# Patient Record
Sex: FEMALE | Race: WHITE | Employment: OTHER | URBAN - METROPOLITAN AREA
[De-identification: names, ages, dates, MRNs, and addresses within clinical notes are randomized per-mention and may not be internally consistent; named-entity substitution may affect disease eponyms.]

---

## 2017-01-04 PROBLEM — M25.519 NONTRAUMATIC SHOULDER PAIN: Status: ACTIVE | Noted: 2017-01-04

## 2017-01-04 PROBLEM — E04.9 GOITER: Status: ACTIVE | Noted: 2017-01-04

## 2017-01-04 PROBLEM — M25.60 LIMITED JOINT RANGE OF MOTION (ROM): Status: ACTIVE | Noted: 2017-01-04

## 2017-01-16 PROBLEM — H61.90 LESION OF EARLOBE: Status: ACTIVE | Noted: 2017-01-16

## 2017-01-16 PROBLEM — F17.210 CIGARETTE SMOKER TWO PACKS A DAY OR LESS: Status: ACTIVE | Noted: 2017-01-16

## 2017-01-23 PROBLEM — I10 HTN (HYPERTENSION): Status: ACTIVE | Noted: 2017-01-23

## 2017-02-24 PROBLEM — I35.0 NONRHEUMATIC AORTIC VALVE STENOSIS: Status: ACTIVE | Noted: 2017-02-24

## 2017-02-24 PROBLEM — E55.9 VITAMIN D DEFICIENCY: Status: ACTIVE | Noted: 2017-02-24

## 2017-02-24 PROBLEM — E11.9 TYPE 2 DIABETES MELLITUS WITHOUT COMPLICATION, WITHOUT LONG-TERM CURRENT USE OF INSULIN (HCC): Status: ACTIVE | Noted: 2017-02-24

## 2017-02-24 PROBLEM — R94.31 ABNORMAL EKG: Status: ACTIVE | Noted: 2017-02-24

## 2017-02-28 PROBLEM — E78.2 MIXED HYPERLIPIDEMIA: Status: ACTIVE | Noted: 2017-02-28

## 2017-02-28 PROBLEM — E66.8 MODERATE OBESITY: Status: ACTIVE | Noted: 2017-02-28

## 2017-02-28 PROBLEM — E66.9 MODERATE OBESITY: Status: ACTIVE | Noted: 2017-02-28

## 2017-09-20 PROBLEM — M21.70 LEG LENGTH DIFFERENCE, ACQUIRED: Status: ACTIVE | Noted: 2017-09-20

## 2017-10-06 PROBLEM — G89.29 CHRONIC MIDLINE LOW BACK PAIN WITH LEFT-SIDED SCIATICA: Status: ACTIVE | Noted: 2017-10-06

## 2017-10-06 PROBLEM — M54.42 CHRONIC MIDLINE LOW BACK PAIN WITH LEFT-SIDED SCIATICA: Status: ACTIVE | Noted: 2017-10-06

## 2017-10-12 PROBLEM — E89.0 POSTOPERATIVE HYPOTHYROIDISM: Status: ACTIVE | Noted: 2017-10-12

## 2017-12-13 PROBLEM — N95.1 HOT FLASH, MENOPAUSAL: Status: ACTIVE | Noted: 2017-12-13

## 2017-12-13 PROBLEM — R04.0 MILD EPISTAXIS: Status: ACTIVE | Noted: 2017-12-13

## 2018-02-14 PROBLEM — F33.2 SEVERE EPISODE OF RECURRENT MAJOR DEPRESSIVE DISORDER, WITHOUT PSYCHOTIC FEATURES (HCC): Status: ACTIVE | Noted: 2018-02-14

## 2018-03-13 ENCOUNTER — OFFICE VISIT (OUTPATIENT)
Dept: FAMILY MEDICINE CLINIC | Age: 63
End: 2018-03-13
Payer: COMMERCIAL

## 2018-03-13 VITALS
HEIGHT: 62 IN | WEIGHT: 194.25 LBS | BODY MASS INDEX: 35.75 KG/M2 | DIASTOLIC BLOOD PRESSURE: 68 MMHG | TEMPERATURE: 97.6 F | RESPIRATION RATE: 17 BRPM | OXYGEN SATURATION: 96 % | HEART RATE: 67 BPM | SYSTOLIC BLOOD PRESSURE: 122 MMHG

## 2018-03-13 DIAGNOSIS — Z13.31 POSITIVE DEPRESSION SCREENING: ICD-10-CM

## 2018-03-13 DIAGNOSIS — F33.2 SEVERE EPISODE OF RECURRENT MAJOR DEPRESSIVE DISORDER, WITHOUT PSYCHOTIC FEATURES (HCC): Primary | ICD-10-CM

## 2018-03-13 DIAGNOSIS — F32.1 MODERATE SINGLE CURRENT EPISODE OF MAJOR DEPRESSIVE DISORDER (HCC): ICD-10-CM

## 2018-03-13 PROCEDURE — G8482 FLU IMMUNIZE ORDER/ADMIN: HCPCS | Performed by: NURSE PRACTITIONER

## 2018-03-13 PROCEDURE — G8431 POS CLIN DEPRES SCRN F/U DOC: HCPCS | Performed by: NURSE PRACTITIONER

## 2018-03-13 PROCEDURE — G8427 DOCREV CUR MEDS BY ELIG CLIN: HCPCS | Performed by: NURSE PRACTITIONER

## 2018-03-13 PROCEDURE — G8417 CALC BMI ABV UP PARAM F/U: HCPCS | Performed by: NURSE PRACTITIONER

## 2018-03-13 PROCEDURE — 1036F TOBACCO NON-USER: CPT | Performed by: NURSE PRACTITIONER

## 2018-03-13 PROCEDURE — 99213 OFFICE O/P EST LOW 20 MIN: CPT | Performed by: NURSE PRACTITIONER

## 2018-03-13 PROCEDURE — 3014F SCREEN MAMMO DOC REV: CPT | Performed by: NURSE PRACTITIONER

## 2018-03-13 PROCEDURE — 96160 PT-FOCUSED HLTH RISK ASSMT: CPT | Performed by: NURSE PRACTITIONER

## 2018-03-13 PROCEDURE — 3017F COLORECTAL CA SCREEN DOC REV: CPT | Performed by: NURSE PRACTITIONER

## 2018-03-13 ASSESSMENT — ENCOUNTER SYMPTOMS
VOICE CHANGE: 0
BACK PAIN: 0
NAUSEA: 0
TROUBLE SWALLOWING: 0
SINUS PAIN: 0
SORE THROAT: 0
SINUS PRESSURE: 0
CONSTIPATION: 0
VOMITING: 0
ABDOMINAL PAIN: 0
WHEEZING: 0
FACIAL SWELLING: 0
RHINORRHEA: 0
COLOR CHANGE: 0
DIARRHEA: 0
SHORTNESS OF BREATH: 0
COUGH: 0

## 2018-03-13 ASSESSMENT — PATIENT HEALTH QUESTIONNAIRE - PHQ9
7. TROUBLE CONCENTRATING ON THINGS, SUCH AS READING THE NEWSPAPER OR WATCHING TELEVISION: 0
4. FEELING TIRED OR HAVING LITTLE ENERGY: 2
6. FEELING BAD ABOUT YOURSELF - OR THAT YOU ARE A FAILURE OR HAVE LET YOURSELF OR YOUR FAMILY DOWN: 0
SUM OF ALL RESPONSES TO PHQ QUESTIONS 1-9: 12
8. MOVING OR SPEAKING SO SLOWLY THAT OTHER PEOPLE COULD HAVE NOTICED. OR THE OPPOSITE, BEING SO FIGETY OR RESTLESS THAT YOU HAVE BEEN MOVING AROUND A LOT MORE THAN USUAL: 0
3. TROUBLE FALLING OR STAYING ASLEEP: 3
9. THOUGHTS THAT YOU WOULD BE BETTER OFF DEAD, OR OF HURTING YOURSELF: 0
2. FEELING DOWN, DEPRESSED OR HOPELESS: 2
10. IF YOU CHECKED OFF ANY PROBLEMS, HOW DIFFICULT HAVE THESE PROBLEMS MADE IT FOR YOU TO DO YOUR WORK, TAKE CARE OF THINGS AT HOME, OR GET ALONG WITH OTHER PEOPLE: 1
SUM OF ALL RESPONSES TO PHQ9 QUESTIONS 1 & 2: 5
5. POOR APPETITE OR OVEREATING: 2
1. LITTLE INTEREST OR PLEASURE IN DOING THINGS: 3

## 2018-03-13 NOTE — PROGRESS NOTES
intolerance, heat intolerance, polydipsia, polyphagia and polyuria. Musculoskeletal: Negative for arthralgias, back pain, gait problem, joint swelling, myalgias, neck pain and neck stiffness. Skin: Negative for color change, pallor, rash and wound. Allergic/Immunologic: Negative for environmental allergies, food allergies and immunocompromised state. Neurological: Negative for dizziness, tremors, seizures, syncope, facial asymmetry, speech difficulty, weakness, light-headedness, numbness and headaches. Hematological: Negative for adenopathy. Does not bruise/bleed easily. Psychiatric/Behavioral: Positive for decreased concentration, dysphoric mood and sleep disturbance. Negative for agitation, behavioral problems, confusion, hallucinations, self-injury and suicidal ideas. The patient is not nervous/anxious and is not hyperactive.         OBJECTIVE:     VS:  Wt Readings from Last 3 Encounters:   03/13/18 194 lb 4 oz (88.1 kg)   02/14/18 192 lb 12 oz (87.4 kg)   01/31/18 191 lb (86.6 kg)                       Vitals:    03/13/18 1438   BP: 122/68   Site: Left Arm   Position: Sitting   Cuff Size: Medium Adult   Pulse: 67   Resp: 17   Temp: 97.6 °F (36.4 °C)   TempSrc: Temporal   SpO2: 96%   Weight: 194 lb 4 oz (88.1 kg)   Height: 5' 2\" (1.575 m)       General: Alert and oriented to person, place, and time, well developed and well nourished, obese in no acute distress  SKIN: Warm and dry, intact without any rash, masses or lesions  HEAD: normocephalic, atraumatic  Eyes: sclera/conjunctiva clear, PERRLA, EOMI's intact  ENT: tympanic membranes, external ear and ear canal normal bilaterally, normal hearing, Nose without deformity, nasal mucosa and turbinates normal without polyps   Throat: clear, tongue midline, tonsils 1+, no drainage, no masses or lesions noted, good dentition  Neck: supple and non-tender without mass, trachea midline, no cervical lymphadenopathy, S/P thyroidectomy  Cardiovascular: regular rate

## 2018-04-09 ENCOUNTER — TELEPHONE (OUTPATIENT)
Dept: FAMILY MEDICINE CLINIC | Age: 63
End: 2018-04-09

## 2018-04-10 RX ORDER — IBUPROFEN 800 MG/1
800 TABLET ORAL EVERY 6 HOURS PRN
Qty: 120 TABLET | Refills: 0 | Status: SHIPPED | OUTPATIENT
Start: 2018-04-10 | End: 2019-07-10 | Stop reason: SDUPTHER

## 2018-04-16 ENCOUNTER — OFFICE VISIT (OUTPATIENT)
Dept: FAMILY MEDICINE CLINIC | Age: 63
End: 2018-04-16
Payer: COMMERCIAL

## 2018-04-16 VITALS
HEIGHT: 63 IN | TEMPERATURE: 97.3 F | WEIGHT: 199.12 LBS | BODY MASS INDEX: 35.28 KG/M2 | HEART RATE: 89 BPM | SYSTOLIC BLOOD PRESSURE: 136 MMHG | RESPIRATION RATE: 20 BRPM | DIASTOLIC BLOOD PRESSURE: 60 MMHG | OXYGEN SATURATION: 98 %

## 2018-04-16 DIAGNOSIS — R63.5 WEIGHT GAIN: ICD-10-CM

## 2018-04-16 DIAGNOSIS — Z13.31 POSITIVE DEPRESSION SCREENING: ICD-10-CM

## 2018-04-16 DIAGNOSIS — E11.9 TYPE 2 DIABETES MELLITUS WITHOUT COMPLICATION, WITHOUT LONG-TERM CURRENT USE OF INSULIN (HCC): ICD-10-CM

## 2018-04-16 DIAGNOSIS — F33.2 SEVERE EPISODE OF RECURRENT MAJOR DEPRESSIVE DISORDER, WITHOUT PSYCHOTIC FEATURES (HCC): Primary | ICD-10-CM

## 2018-04-16 PROCEDURE — G8417 CALC BMI ABV UP PARAM F/U: HCPCS | Performed by: NURSE PRACTITIONER

## 2018-04-16 PROCEDURE — 1036F TOBACCO NON-USER: CPT | Performed by: NURSE PRACTITIONER

## 2018-04-16 PROCEDURE — 3014F SCREEN MAMMO DOC REV: CPT | Performed by: NURSE PRACTITIONER

## 2018-04-16 PROCEDURE — 3045F PR MOST RECENT HEMOGLOBIN A1C LEVEL 7.0-9.0%: CPT | Performed by: NURSE PRACTITIONER

## 2018-04-16 PROCEDURE — 2022F DILAT RTA XM EVC RTNOPTHY: CPT | Performed by: NURSE PRACTITIONER

## 2018-04-16 PROCEDURE — 3017F COLORECTAL CA SCREEN DOC REV: CPT | Performed by: NURSE PRACTITIONER

## 2018-04-16 PROCEDURE — G8427 DOCREV CUR MEDS BY ELIG CLIN: HCPCS | Performed by: NURSE PRACTITIONER

## 2018-04-16 PROCEDURE — 99213 OFFICE O/P EST LOW 20 MIN: CPT | Performed by: NURSE PRACTITIONER

## 2018-04-16 RX ORDER — VENLAFAXINE HYDROCHLORIDE 75 MG/1
CAPSULE, EXTENDED RELEASE ORAL
Refills: 12 | COMMUNITY
Start: 2018-04-04 | End: 2018-12-17 | Stop reason: DRUGHIGH

## 2018-04-16 RX ORDER — BLOOD-GLUCOSE METER
1 KIT MISCELLANEOUS DAILY
Qty: 1 DEVICE | Refills: 0 | Status: SHIPPED | OUTPATIENT
Start: 2018-04-16 | End: 2018-08-08 | Stop reason: SDUPTHER

## 2018-04-16 ASSESSMENT — ENCOUNTER SYMPTOMS
SINUS PRESSURE: 0
NAUSEA: 0
SINUS PAIN: 0
DIARRHEA: 0
EYE REDNESS: 0
RHINORRHEA: 0
CONSTIPATION: 0
EYE ITCHING: 0
TROUBLE SWALLOWING: 0
SHORTNESS OF BREATH: 0
EYE PAIN: 0
FACIAL SWELLING: 0
BACK PAIN: 0
ABDOMINAL PAIN: 0
SORE THROAT: 0
VOICE CHANGE: 0
EYE DISCHARGE: 0
COUGH: 0
VOMITING: 0
WHEEZING: 0
PHOTOPHOBIA: 0
COLOR CHANGE: 0

## 2018-04-16 ASSESSMENT — PATIENT HEALTH QUESTIONNAIRE - PHQ9
5. POOR APPETITE OR OVEREATING: 2
3. TROUBLE FALLING OR STAYING ASLEEP: 2
10. IF YOU CHECKED OFF ANY PROBLEMS, HOW DIFFICULT HAVE THESE PROBLEMS MADE IT FOR YOU TO DO YOUR WORK, TAKE CARE OF THINGS AT HOME, OR GET ALONG WITH OTHER PEOPLE: 1
1. LITTLE INTEREST OR PLEASURE IN DOING THINGS: 3
2. FEELING DOWN, DEPRESSED OR HOPELESS: 0
8. MOVING OR SPEAKING SO SLOWLY THAT OTHER PEOPLE COULD HAVE NOTICED. OR THE OPPOSITE, BEING SO FIGETY OR RESTLESS THAT YOU HAVE BEEN MOVING AROUND A LOT MORE THAN USUAL: 0
6. FEELING BAD ABOUT YOURSELF - OR THAT YOU ARE A FAILURE OR HAVE LET YOURSELF OR YOUR FAMILY DOWN: 0
SUM OF ALL RESPONSES TO PHQ QUESTIONS 1-9: 10
SUM OF ALL RESPONSES TO PHQ9 QUESTIONS 1 & 2: 3
9. THOUGHTS THAT YOU WOULD BE BETTER OFF DEAD, OR OF HURTING YOURSELF: 0
7. TROUBLE CONCENTRATING ON THINGS, SUCH AS READING THE NEWSPAPER OR WATCHING TELEVISION: 0
4. FEELING TIRED OR HAVING LITTLE ENERGY: 3

## 2018-05-07 ENCOUNTER — OFFICE VISIT (OUTPATIENT)
Dept: FAMILY MEDICINE CLINIC | Age: 63
End: 2018-05-07
Payer: COMMERCIAL

## 2018-05-07 ENCOUNTER — HOSPITAL ENCOUNTER (OUTPATIENT)
Age: 63
Discharge: HOME | End: 2018-05-09
Payer: COMMERCIAL

## 2018-05-07 VITALS
HEIGHT: 63 IN | BODY MASS INDEX: 35.53 KG/M2 | TEMPERATURE: 97.8 F | SYSTOLIC BLOOD PRESSURE: 132 MMHG | RESPIRATION RATE: 16 BRPM | WEIGHT: 200.5 LBS | HEART RATE: 82 BPM | OXYGEN SATURATION: 96 % | DIASTOLIC BLOOD PRESSURE: 66 MMHG

## 2018-05-07 DIAGNOSIS — Z12.11 ENCOUNTER FOR SCREENING COLONOSCOPY: ICD-10-CM

## 2018-05-07 DIAGNOSIS — R25.2 LEG CRAMPS: ICD-10-CM

## 2018-05-07 DIAGNOSIS — E11.9 TYPE 2 DIABETES MELLITUS WITHOUT COMPLICATION, WITHOUT LONG-TERM CURRENT USE OF INSULIN (HCC): Primary | ICD-10-CM

## 2018-05-07 DIAGNOSIS — K21.00 GASTROESOPHAGEAL REFLUX DISEASE WITH ESOPHAGITIS: ICD-10-CM

## 2018-05-07 LAB
GLUCOSE BLD-MCNC: 191 MG/DL
HBA1C MFR BLD: 8.4 %

## 2018-05-07 PROCEDURE — G8417 CALC BMI ABV UP PARAM F/U: HCPCS | Performed by: NURSE PRACTITIONER

## 2018-05-07 PROCEDURE — 1036F TOBACCO NON-USER: CPT | Performed by: NURSE PRACTITIONER

## 2018-05-07 PROCEDURE — 3017F COLORECTAL CA SCREEN DOC REV: CPT | Performed by: NURSE PRACTITIONER

## 2018-05-07 PROCEDURE — G8427 DOCREV CUR MEDS BY ELIG CLIN: HCPCS | Performed by: NURSE PRACTITIONER

## 2018-05-07 PROCEDURE — 83036 HEMOGLOBIN GLYCOSYLATED A1C: CPT | Performed by: NURSE PRACTITIONER

## 2018-05-07 PROCEDURE — 3045F PR MOST RECENT HEMOGLOBIN A1C LEVEL 7.0-9.0%: CPT | Performed by: NURSE PRACTITIONER

## 2018-05-07 PROCEDURE — 84132 ASSAY OF SERUM POTASSIUM: CPT

## 2018-05-07 PROCEDURE — 82962 GLUCOSE BLOOD TEST: CPT | Performed by: NURSE PRACTITIONER

## 2018-05-07 PROCEDURE — 2022F DILAT RTA XM EVC RTNOPTHY: CPT | Performed by: NURSE PRACTITIONER

## 2018-05-07 PROCEDURE — 99214 OFFICE O/P EST MOD 30 MIN: CPT | Performed by: NURSE PRACTITIONER

## 2018-05-07 PROCEDURE — 83735 ASSAY OF MAGNESIUM: CPT

## 2018-05-07 RX ORDER — PANTOPRAZOLE SODIUM 20 MG/1
20 TABLET, DELAYED RELEASE ORAL DAILY
Qty: 30 TABLET | Refills: 3 | Status: SHIPPED | OUTPATIENT
Start: 2018-05-07 | End: 2018-12-17 | Stop reason: ALTCHOICE

## 2018-05-07 ASSESSMENT — ENCOUNTER SYMPTOMS
VOMITING: 0
ABDOMINAL PAIN: 0
SINUS PRESSURE: 0
ABDOMINAL DISTENTION: 0
WHEEZING: 0
SHORTNESS OF BREATH: 0
CONSTIPATION: 0
BACK PAIN: 0
TROUBLE SWALLOWING: 0
RHINORRHEA: 0
COUGH: 1
VOICE CHANGE: 0
SINUS PAIN: 0
NAUSEA: 0
COLOR CHANGE: 0
DIARRHEA: 0
SORE THROAT: 0
FACIAL SWELLING: 0

## 2018-05-08 LAB
MAGNESIUM: 2.2 MG/DL (ref 1.6–2.6)
POTASSIUM SERPL-SCNC: 4.5 MMOL/L (ref 3.5–5)

## 2018-05-13 ENCOUNTER — PATIENT MESSAGE (OUTPATIENT)
Dept: FAMILY MEDICINE CLINIC | Age: 63
End: 2018-05-13

## 2018-05-13 DIAGNOSIS — E55.9 VITAMIN D DEFICIENCY: ICD-10-CM

## 2018-05-14 RX ORDER — ERGOCALCIFEROL 1.25 MG/1
CAPSULE ORAL
Qty: 4 CAPSULE | Refills: 0 | Status: SHIPPED | OUTPATIENT
Start: 2018-05-14 | End: 2018-06-07 | Stop reason: SDUPTHER

## 2018-05-21 LAB — DIABETIC RETINOPATHY: NORMAL

## 2018-05-22 ENCOUNTER — OFFICE VISIT (OUTPATIENT)
Dept: FAMILY MEDICINE CLINIC | Age: 63
End: 2018-05-22
Payer: COMMERCIAL

## 2018-05-22 VITALS
WEIGHT: 184 LBS | BODY MASS INDEX: 32.6 KG/M2 | TEMPERATURE: 97.6 F | SYSTOLIC BLOOD PRESSURE: 126 MMHG | DIASTOLIC BLOOD PRESSURE: 68 MMHG | HEART RATE: 82 BPM | HEIGHT: 63 IN | RESPIRATION RATE: 16 BRPM | OXYGEN SATURATION: 94 %

## 2018-05-22 DIAGNOSIS — Z84.81 FAMILY HISTORY OF BRCA GENE POSITIVE: ICD-10-CM

## 2018-05-22 DIAGNOSIS — E11.9 TYPE 2 DIABETES MELLITUS WITHOUT COMPLICATION, WITHOUT LONG-TERM CURRENT USE OF INSULIN (HCC): Primary | ICD-10-CM

## 2018-05-22 DIAGNOSIS — Z80.3 FHX: BREAST CANCER IN FIRST DEGREE RELATIVE: ICD-10-CM

## 2018-05-22 LAB — GLUCOSE BLD-MCNC: 173 MG/DL

## 2018-05-22 PROCEDURE — 3017F COLORECTAL CA SCREEN DOC REV: CPT | Performed by: NURSE PRACTITIONER

## 2018-05-22 PROCEDURE — 1036F TOBACCO NON-USER: CPT | Performed by: NURSE PRACTITIONER

## 2018-05-22 PROCEDURE — G8417 CALC BMI ABV UP PARAM F/U: HCPCS | Performed by: NURSE PRACTITIONER

## 2018-05-22 PROCEDURE — 2022F DILAT RTA XM EVC RTNOPTHY: CPT | Performed by: NURSE PRACTITIONER

## 2018-05-22 PROCEDURE — 82962 GLUCOSE BLOOD TEST: CPT | Performed by: NURSE PRACTITIONER

## 2018-05-22 PROCEDURE — G8427 DOCREV CUR MEDS BY ELIG CLIN: HCPCS | Performed by: NURSE PRACTITIONER

## 2018-05-22 PROCEDURE — 99213 OFFICE O/P EST LOW 20 MIN: CPT | Performed by: NURSE PRACTITIONER

## 2018-05-22 PROCEDURE — 3045F PR MOST RECENT HEMOGLOBIN A1C LEVEL 7.0-9.0%: CPT | Performed by: NURSE PRACTITIONER

## 2018-05-22 RX ORDER — PEN NEEDLE, DIABETIC 32GX 5/32"
NEEDLE, DISPOSABLE MISCELLANEOUS
Refills: 3 | COMMUNITY
Start: 2018-05-14 | End: 2018-12-17 | Stop reason: SDUPTHER

## 2018-05-22 ASSESSMENT — ENCOUNTER SYMPTOMS
SHORTNESS OF BREATH: 0
COLOR CHANGE: 0
CONSTIPATION: 0
NAUSEA: 0
VOMITING: 0
WHEEZING: 0
DIARRHEA: 0
ABDOMINAL PAIN: 0
COUGH: 0
BACK PAIN: 0

## 2018-06-07 DIAGNOSIS — E55.9 VITAMIN D DEFICIENCY: ICD-10-CM

## 2018-06-07 RX ORDER — ERGOCALCIFEROL 1.25 MG/1
CAPSULE ORAL
Qty: 4 CAPSULE | Refills: 0 | Status: SHIPPED | OUTPATIENT
Start: 2018-06-07 | End: 2018-07-05 | Stop reason: SDUPTHER

## 2018-06-22 ENCOUNTER — OFFICE VISIT (OUTPATIENT)
Dept: FAMILY MEDICINE CLINIC | Age: 63
End: 2018-06-22
Payer: COMMERCIAL

## 2018-06-22 VITALS
DIASTOLIC BLOOD PRESSURE: 72 MMHG | OXYGEN SATURATION: 97 % | SYSTOLIC BLOOD PRESSURE: 128 MMHG | HEART RATE: 64 BPM | TEMPERATURE: 97.7 F | HEIGHT: 63 IN | RESPIRATION RATE: 19 BRPM | BODY MASS INDEX: 34.2 KG/M2 | WEIGHT: 193 LBS

## 2018-06-22 DIAGNOSIS — Z84.81 FH: BRCA1 GENE POSITIVE: ICD-10-CM

## 2018-06-22 DIAGNOSIS — Z12.31 ENCOUNTER FOR SCREENING MAMMOGRAM FOR HIGH-RISK PATIENT: ICD-10-CM

## 2018-06-22 DIAGNOSIS — E11.9 TYPE 2 DIABETES MELLITUS WITHOUT COMPLICATION, WITHOUT LONG-TERM CURRENT USE OF INSULIN (HCC): ICD-10-CM
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
## 2018-06-22 LAB — GLUCOSE BLD-MCNC: 123 MG/DL

## 2018-06-22 PROCEDURE — 1036F TOBACCO NON-USER: CPT | Performed by: NURSE PRACTITIONER

## 2018-06-22 PROCEDURE — G8427 DOCREV CUR MEDS BY ELIG CLIN: HCPCS | Performed by: NURSE PRACTITIONER

## 2018-06-22 PROCEDURE — 82962 GLUCOSE BLOOD TEST: CPT | Performed by: NURSE PRACTITIONER

## 2018-06-22 PROCEDURE — 2022F DILAT RTA XM EVC RTNOPTHY: CPT | Performed by: NURSE PRACTITIONER

## 2018-06-22 PROCEDURE — 3045F PR MOST RECENT HEMOGLOBIN A1C LEVEL 7.0-9.0%: CPT | Performed by: NURSE PRACTITIONER

## 2018-06-22 PROCEDURE — G8417 CALC BMI ABV UP PARAM F/U: HCPCS | Performed by: NURSE PRACTITIONER

## 2018-06-22 PROCEDURE — 3017F COLORECTAL CA SCREEN DOC REV: CPT | Performed by: NURSE PRACTITIONER

## 2018-06-22 PROCEDURE — 99214 OFFICE O/P EST MOD 30 MIN: CPT | Performed by: NURSE PRACTITIONER

## 2018-06-22 RX ORDER — DOXYCYCLINE HYCLATE 100 MG/1
100 CAPSULE ORAL 2 TIMES DAILY
Qty: 20 CAPSULE | Refills: 0 | Status: SHIPPED | OUTPATIENT
Start: 2018-06-22 | End: 2018-07-02

## 2018-06-22 ASSESSMENT — ENCOUNTER SYMPTOMS
DIARRHEA: 0
NAUSEA: 0
COLOR CHANGE: 1
VOMITING: 0
ABDOMINAL PAIN: 0
COUGH: 0
WHEEZING: 0
SHORTNESS OF BREATH: 0
BACK PAIN: 0
CONSTIPATION: 0

## 2018-06-28 ENCOUNTER — OFFICE VISIT (OUTPATIENT)
Dept: FAMILY MEDICINE CLINIC | Age: 63
End: 2018-06-28
Payer: COMMERCIAL

## 2018-06-28 VITALS
DIASTOLIC BLOOD PRESSURE: 70 MMHG | BODY MASS INDEX: 34.2 KG/M2 | SYSTOLIC BLOOD PRESSURE: 120 MMHG | OXYGEN SATURATION: 96 % | WEIGHT: 193 LBS | HEART RATE: 86 BPM | HEIGHT: 63 IN | TEMPERATURE: 98.2 F
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
## 2018-06-28 DIAGNOSIS — E11.9 TYPE 2 DIABETES MELLITUS WITHOUT COMPLICATION, WITHOUT LONG-TERM CURRENT USE OF INSULIN (HCC): ICD-10-CM

## 2018-06-28 PROCEDURE — 99213 OFFICE O/P EST LOW 20 MIN: CPT | Performed by: FAMILY MEDICINE

## 2018-06-28 PROCEDURE — 3017F COLORECTAL CA SCREEN DOC REV: CPT | Performed by: FAMILY MEDICINE

## 2018-06-28 PROCEDURE — 2022F DILAT RTA XM EVC RTNOPTHY: CPT | Performed by: FAMILY MEDICINE

## 2018-06-28 PROCEDURE — 1036F TOBACCO NON-USER: CPT | Performed by: FAMILY MEDICINE

## 2018-06-28 PROCEDURE — 3045F PR MOST RECENT HEMOGLOBIN A1C LEVEL 7.0-9.0%: CPT | Performed by: FAMILY MEDICINE

## 2018-06-28 PROCEDURE — G8427 DOCREV CUR MEDS BY ELIG CLIN: HCPCS | Performed by: FAMILY MEDICINE

## 2018-06-28 PROCEDURE — G8417 CALC BMI ABV UP PARAM F/U: HCPCS | Performed by: FAMILY MEDICINE

## 2018-07-05 DIAGNOSIS — E55.9 VITAMIN D DEFICIENCY: ICD-10-CM

## 2018-07-05 RX ORDER — ERGOCALCIFEROL 1.25 MG/1
CAPSULE ORAL
Qty: 4 CAPSULE | Refills: 0 | Status: SHIPPED | OUTPATIENT
Start: 2018-07-05 | End: 2018-12-17 | Stop reason: CLARIF

## 2018-07-23 DIAGNOSIS — E11.9 TYPE 2 DIABETES MELLITUS WITHOUT COMPLICATION, WITHOUT LONG-TERM CURRENT USE OF INSULIN (HCC): ICD-10-CM

## 2018-07-23 RX ORDER — LANCETS 28 GAUGE
1 EACH MISCELLANEOUS DAILY
Qty: 100 EACH | Refills: 12 | Status: SHIPPED | OUTPATIENT
Start: 2018-07-23 | End: 2018-11-09 | Stop reason: CLARIF

## 2018-08-08 DIAGNOSIS — E11.9 TYPE 2 DIABETES MELLITUS WITHOUT COMPLICATION, WITHOUT LONG-TERM CURRENT USE OF INSULIN (HCC): ICD-10-CM

## 2018-08-08 RX ORDER — BLOOD-GLUCOSE METER
KIT MISCELLANEOUS
Qty: 1 DEVICE | Refills: 0 | Status: SHIPPED | OUTPATIENT
Start: 2018-08-08 | End: 2018-11-09 | Stop reason: CLARIF

## 2018-08-15 ENCOUNTER — OFFICE VISIT (OUTPATIENT)
Dept: FAMILY MEDICINE CLINIC | Age: 63
End: 2018-08-15
Payer: MEDICARE

## 2018-08-15 ENCOUNTER — HOSPITAL ENCOUNTER (OUTPATIENT)
Dept: MAMMOGRAPHY | Age: 63
Discharge: HOME | End: 2018-08-17
Payer: MEDICARE

## 2018-08-15 VITALS
HEIGHT: 63 IN | WEIGHT: 183.12 LBS | DIASTOLIC BLOOD PRESSURE: 64 MMHG | HEART RATE: 67 BPM | BODY MASS INDEX: 32.45 KG/M2 | TEMPERATURE: 97.4 F | OXYGEN SATURATION: 98 % | RESPIRATION RATE: 16 BRPM | SYSTOLIC BLOOD PRESSURE: 132 MMHG

## 2018-08-15 DIAGNOSIS — E78.2 MIXED HYPERLIPIDEMIA: ICD-10-CM

## 2018-08-15 DIAGNOSIS — E11.9 TYPE 2 DIABETES MELLITUS WITHOUT COMPLICATION, WITHOUT LONG-TERM CURRENT USE OF INSULIN (HCC): Primary | ICD-10-CM

## 2018-08-15 DIAGNOSIS — Z12.31 ENCOUNTER FOR SCREENING MAMMOGRAM FOR HIGH-RISK PATIENT: ICD-10-CM

## 2018-08-15 DIAGNOSIS — Z84.81 FH: BRCA1 GENE POSITIVE: ICD-10-CM

## 2018-08-15 DIAGNOSIS — E89.0 POSTOPERATIVE HYPOTHYROIDISM: ICD-10-CM

## 2018-08-15 LAB
GLUCOSE BLD-MCNC: 140 MG/DL
HBA1C MFR BLD: 6.7 %

## 2018-08-15 PROCEDURE — 77067 SCR MAMMO BI INCL CAD: CPT

## 2018-08-15 PROCEDURE — 82962 GLUCOSE BLOOD TEST: CPT | Performed by: NURSE PRACTITIONER

## 2018-08-15 PROCEDURE — G8417 CALC BMI ABV UP PARAM F/U: HCPCS | Performed by: NURSE PRACTITIONER

## 2018-08-15 PROCEDURE — G8427 DOCREV CUR MEDS BY ELIG CLIN: HCPCS | Performed by: NURSE PRACTITIONER

## 2018-08-15 PROCEDURE — 99213 OFFICE O/P EST LOW 20 MIN: CPT | Performed by: NURSE PRACTITIONER

## 2018-08-15 PROCEDURE — 1036F TOBACCO NON-USER: CPT | Performed by: NURSE PRACTITIONER

## 2018-08-15 PROCEDURE — 3017F COLORECTAL CA SCREEN DOC REV: CPT | Performed by: NURSE PRACTITIONER

## 2018-08-15 PROCEDURE — 83036 HEMOGLOBIN GLYCOSYLATED A1C: CPT | Performed by: NURSE PRACTITIONER

## 2018-08-15 PROCEDURE — 3044F HG A1C LEVEL LT 7.0%: CPT | Performed by: NURSE PRACTITIONER

## 2018-08-15 PROCEDURE — 2022F DILAT RTA XM EVC RTNOPTHY: CPT | Performed by: NURSE PRACTITIONER

## 2018-08-15 RX ORDER — LEVOTHYROXINE SODIUM 0.12 MG/1
125 TABLET ORAL DAILY
Qty: 30 TABLET | Refills: 6 | Status: SHIPPED | OUTPATIENT
Start: 2018-08-15 | End: 2019-01-11 | Stop reason: SDUPTHER

## 2018-08-15 RX ORDER — ROSUVASTATIN CALCIUM 20 MG/1
20 TABLET, COATED ORAL DAILY
Qty: 30 TABLET | Refills: 6 | Status: SHIPPED | OUTPATIENT
Start: 2018-08-15 | End: 2019-04-16 | Stop reason: SDUPTHER

## 2018-08-15 RX ORDER — HYDROCHLOROTHIAZIDE 25 MG/1
TABLET ORAL
Qty: 30 TABLET | Refills: 0 | Status: SHIPPED | OUTPATIENT
Start: 2018-08-15 | End: 2018-08-23 | Stop reason: SDUPTHER

## 2018-08-15 ASSESSMENT — ENCOUNTER SYMPTOMS
WHEEZING: 0
VOMITING: 0
BACK PAIN: 0
NAUSEA: 0
SINUS PRESSURE: 0
FACIAL SWELLING: 0
SORE THROAT: 0
COLOR CHANGE: 0
RHINORRHEA: 0
TROUBLE SWALLOWING: 0
DIARRHEA: 0
SHORTNESS OF BREATH: 0
CHEST TIGHTNESS: 0
SINUS PAIN: 0
VOICE CHANGE: 0
ABDOMINAL PAIN: 0
COUGH: 0
CONSTIPATION: 0

## 2018-08-15 NOTE — PROGRESS NOTES
diaphoresis, fatigue, fever and unexpected weight change. HENT: Negative for congestion, dental problem, drooling, ear discharge, ear pain, facial swelling, hearing loss, mouth sores, nosebleeds, postnasal drip, rhinorrhea, sinus pain, sinus pressure, sneezing, sore throat, tinnitus, trouble swallowing and voice change. Eyes: Negative for visual disturbance. Respiratory: Negative for cough, chest tightness, shortness of breath and wheezing. Cardiovascular: Negative for chest pain, palpitations and leg swelling. Gastrointestinal: Negative for abdominal pain, constipation, diarrhea, nausea and vomiting. Endocrine: Negative for cold intolerance, heat intolerance, polydipsia, polyphagia and polyuria. Genitourinary: Negative for difficulty urinating, frequency and urgency. Musculoskeletal: Negative for arthralgias, back pain, gait problem, joint swelling, myalgias, neck pain and neck stiffness. Skin: Negative for color change, pallor, rash and wound. Allergic/Immunologic: Negative for environmental allergies, food allergies and immunocompromised state. Neurological: Negative for dizziness, tremors, seizures, syncope, facial asymmetry, speech difficulty, weakness, light-headedness, numbness and headaches. Hematological: Negative for adenopathy. Does not bruise/bleed easily. Psychiatric/Behavioral: Negative for agitation, behavioral problems, confusion, decreased concentration, dysphoric mood, hallucinations, self-injury, sleep disturbance and suicidal ideas. The patient is not nervous/anxious and is not hyperactive.         OBJECTIVE:     VS:  Wt Readings from Last 3 Encounters:   08/15/18 183 lb 1.9 oz (83.1 kg)   06/28/18 193 lb (87.5 kg)   06/22/18 193 lb (87.5 kg)                       Vitals:    08/15/18 1159   BP: 132/64   Pulse: 67   Resp: 16   Temp: 97.4 °F (36.3 °C)   TempSrc: Temporal   SpO2: 98%   Weight: 183 lb 1.9 oz (83.1 kg)   Height: 5' 3\" (1.6 m)       General: Alert and bedtime then start over and log all values.  -Call next week or send BS through [place] and if the numbers are worsening will need to add different medication. Will increase the victoza to 1.8 mg daily  Bring log to next appointment  Foot exam every day; wash and dry well between toes, look for any redness, cracks, wounds notify provider if any problems occur  Reminder for annual Eye exam  Reminder for Podiatry visits Q 2 Months for toenail care if needed  Reminder to keep vaccines up dated  Exercise 30 minutes daily    Mixed hyperlipidemia well controlled  -     rosuvastatin (CRESTOR) 20 MG tablet; Take 1 tablet by mouth daily For cholesterol  -Will do PA for the Crestor since she didn't tolerate the atorvastatin as her  she is at goal with the Crestor.   -Discussed low fat diet, limit fast food, goodies, breads and pastas if consuming several days a week,  limit any alcohol consumption.  -Discussed weight reduction and exercise 30 minutes 5 days a week for total of 150 minutes weekly.  -Discussed if any unusual muscle aching/pain to contact the office, discussed medication and risk of muscle pain/damage from Rhabdomyolysis. Postoperative hypothyroidism stable  -     levothyroxine (SYNTHROID) 125 MCG tablet; Take 1 tablet by mouth daily For thyroid      Other orders  -     hydrochlorothiazide (HYDRODIURIL) 25 MG tablet; TAKE 1 TABLET BY MOUTH EVERY DAY                Return in about 1 month (around 9/15/2018) for DM med adjustment. Discussed weight loss, Discussed exercising 30 minutes daily and Discussed taking medications as directed and adverse effects        I have reviewed my findings and recommendations with Mame Mai.     Filiberto Fragoso, NP-C, FNP-BC

## 2018-09-20 ENCOUNTER — OFFICE VISIT (OUTPATIENT)
Dept: FAMILY MEDICINE CLINIC | Age: 63
End: 2018-09-20
Payer: MEDICARE

## 2018-09-20 VITALS
HEIGHT: 63 IN | WEIGHT: 183 LBS | OXYGEN SATURATION: 97 % | HEART RATE: 68 BPM | TEMPERATURE: 97 F | BODY MASS INDEX: 32.43 KG/M2 | SYSTOLIC BLOOD PRESSURE: 130 MMHG | DIASTOLIC BLOOD PRESSURE: 78 MMHG | RESPIRATION RATE: 16 BRPM

## 2018-09-20 DIAGNOSIS — Z23 FLU VACCINE NEED: ICD-10-CM

## 2018-09-20 DIAGNOSIS — Z13.31 POSITIVE DEPRESSION SCREENING: ICD-10-CM

## 2018-09-20 DIAGNOSIS — E11.9 TYPE 2 DIABETES MELLITUS WITHOUT COMPLICATION, WITHOUT LONG-TERM CURRENT USE OF INSULIN (HCC): Primary | ICD-10-CM

## 2018-09-20 LAB — GLUCOSE BLD-MCNC: 144 MG/DL

## 2018-09-20 PROCEDURE — 3044F HG A1C LEVEL LT 7.0%: CPT | Performed by: NURSE PRACTITIONER

## 2018-09-20 PROCEDURE — G8431 POS CLIN DEPRES SCRN F/U DOC: HCPCS | Performed by: NURSE PRACTITIONER

## 2018-09-20 PROCEDURE — G8417 CALC BMI ABV UP PARAM F/U: HCPCS | Performed by: NURSE PRACTITIONER

## 2018-09-20 PROCEDURE — 3017F COLORECTAL CA SCREEN DOC REV: CPT | Performed by: NURSE PRACTITIONER

## 2018-09-20 PROCEDURE — 90662 IIV NO PRSV INCREASED AG IM: CPT | Performed by: NURSE PRACTITIONER

## 2018-09-20 PROCEDURE — G0008 ADMIN INFLUENZA VIRUS VAC: HCPCS | Performed by: NURSE PRACTITIONER

## 2018-09-20 PROCEDURE — 2022F DILAT RTA XM EVC RTNOPTHY: CPT | Performed by: NURSE PRACTITIONER

## 2018-09-20 PROCEDURE — 1036F TOBACCO NON-USER: CPT | Performed by: NURSE PRACTITIONER

## 2018-09-20 PROCEDURE — 99213 OFFICE O/P EST LOW 20 MIN: CPT | Performed by: NURSE PRACTITIONER

## 2018-09-20 PROCEDURE — 82962 GLUCOSE BLOOD TEST: CPT | Performed by: NURSE PRACTITIONER

## 2018-09-20 PROCEDURE — G8427 DOCREV CUR MEDS BY ELIG CLIN: HCPCS | Performed by: NURSE PRACTITIONER

## 2018-09-20 RX ORDER — HYDROCHLOROTHIAZIDE 25 MG/1
25 TABLET ORAL DAILY
Qty: 30 TABLET | Refills: 3 | Status: SHIPPED | OUTPATIENT
Start: 2018-09-20 | End: 2018-12-17 | Stop reason: SDUPTHER

## 2018-09-20 ASSESSMENT — ENCOUNTER SYMPTOMS
TROUBLE SWALLOWING: 0
COLOR CHANGE: 0
SHORTNESS OF BREATH: 0
WHEEZING: 0
BACK PAIN: 0
FACIAL SWELLING: 0
SORE THROAT: 0
CHEST TIGHTNESS: 0
CONSTIPATION: 0
COUGH: 0
DIARRHEA: 0
VOMITING: 0
SINUS PAIN: 0
RHINORRHEA: 0
VOICE CHANGE: 0
SINUS PRESSURE: 0
ABDOMINAL PAIN: 0
NAUSEA: 0

## 2018-09-20 NOTE — PROGRESS NOTES
General: Alert and oriented to person, place, and time, well developed and well nourished, in no acute distress  SKIN: Warm and dry, intact without any rash, masses or lesions  HEAD: normocephalic, atraumatic  Eyes: sclera/conjunctiva clear, PERRLA, EOMI's intact  ENT: tympanic membranes, external ear and ear canal normal bilaterally, normal hearing, Nose without deformity, nasal mucosa and turbinates normal without polyps   Throat: clear, tongue midline, tonsils 1+, drainage, no masses or lesions noted, good dentition  Neck: supple and non-tender without mass, trachea midline, no cervical lymphadenopathy, no bruit, no thyromegaly or nodules  Cardiovascular: regular rate and regular SRGFSI,4/0 systolic at 2nd right intercostal space, throughout the precordium, radiates to axilla, radiates to carotids   rubs, clicks, or gallop. Distal pulses intact, no carotid bruits. No edema  Pulmonary/Chest: clear to auscultation bilaterally, no wheezes, rales or rhonchi, normal air movement, no respiratory distress  Abdomen: soft, non-tender, non-distended, normal bowel sounds, no masses or hepatosplenomegaly  Musculoskeletal: Normal ROM, no joint swelling, deformity or tenderness   Neurologic: reflexes normal and symmetric, no cranial nerve deficit, gait, coordination and speech normal  Extremities: no clubbing, cyanosis, or edema. Psychiatric: Good eye contact, normal mood and affect, answers questions appropriately    ASSESSMENT/PLAN   Tiara Melendrez was seen today for diabetes and other. Diagnoses and all orders for this visit:    Type 2 diabetes mellitus without complication, without long-term current use of insulin ([facility])  -     POCT Glucose  -     Liraglutide (VICTOZA) 18 MG/3ML SOPN SC injection; Inject 1.8 mg into the skin daily  Monitor BS at different times: 1 day fasting, then next day 2 hours after lunch, next day 2 hours after dinner, next day at bedtime then start over and log all values.   Bring log to next with Maliha Russ.     Summer Gonzalez, NP-C, FNP-BC

## 2018-09-20 NOTE — PATIENT INSTRUCTIONS
Patient Education        Influenza (Flu) Vaccine (Inactivated or Recombinant): What You Need to Know  Why get vaccinated? Influenza (\"flu\") is a contagious disease that spreads around the [place] every winter, usually between October and May. Flu is caused by influenza viruses and is spread mainly by coughing, sneezing, and close contact. Anyone can get flu. Flu strikes suddenly and can last several days. Symptoms vary by age, but can include:  · Fever/chills. · Sore throat. · Muscle aches. · Fatigue. · Cough. · Headache. · Runny or stuffy nose. Flu can also lead to pneumonia and blood infections, and cause diarrhea and seizures in children. If you have a medical condition, such as heart or lung disease, flu can make it worse. Flu is more dangerous for some people. Infants and young children, people 72years of age and older, pregnant women, and people with certain health conditions or a weakened immune system are at greatest risk. Each year thousands of people in the [facility] die from flu, and many more are hospitalized. Flu vaccine can:  · Keep you from getting flu. · Make flu less severe if you do get it. · Keep you from spreading flu to your family and other people. Inactivated and recombinant flu vaccines  A dose of flu vaccine is recommended every flu season. Children 6 months through 6years of age may need two doses during the same flu season. Everyone else needs only one dose each flu season. Some inactivated flu vaccines contain a very small amount of a mercury-based preservative called thimerosal. Studies have not shown thimerosal in vaccines to be harmful, but flu vaccines that do not contain thimerosal are available. There is no live flu virus in flu shots. They cannot cause the flu. There are many flu viruses, and they are always changing.  Each year a new flu vaccine is made to protect against three or four viruses that are likely to cause disease in the upcoming flu pneumococcal vaccine (PCV13) and/or DTaP vaccine at the same time might be slightly more likely to have a seizure caused by fever. Ask your doctor for more information. Tell your doctor if a child who is getting flu vaccine has ever had a seizure  Problems that could happen after any injected vaccine:  · People sometimes faint after a medical procedure, including vaccination. Sitting or lying down for about 15 minutes can help prevent fainting, and injuries caused by a fall. Tell your doctor if you feel dizzy, or have vision changes or ringing in the ears. · Some people get severe pain in the shoulder and have difficulty moving the arm where a shot was given. This happens very rarely. · Any medication can cause a severe allergic reaction. Such reactions from a vaccine are very rare, estimated at about 1 in a million doses, and would happen within a few minutes to a few hours after the vaccination. As with any medicine, there is a very remote chance of a vaccine causing a serious injury or death. The safety of vaccines is always being monitored. For more information, visit: www.cdc.gov/vaccinesafety/. What if there is a serious reaction? What should I look for? · Look for anything that concerns you, such as signs of a severe allergic reaction, very high fever, or unusual behavior. Signs of a severe allergic reaction can include hives, swelling of the face and throat, difficulty breathing, a fast heartbeat, dizziness, and weakness - usually within a few minutes to a few hours after the vaccination. What should I do? · If you think it is a severe allergic reaction or other emergency that can't wait, call 9-1-1 and get the person to the nearest hospital. Otherwise, call your doctor. · Reactions should be reported to the \"Vaccine Adverse Event Reporting System\" (VAERS). Your doctor should file this report, or you can do it yourself through the VAERS website at www.[email or web address], or by calling

## 2018-11-09 ENCOUNTER — TELEPHONE (OUTPATIENT)
Dept: FAMILY MEDICINE CLINIC | Age: 63
End: 2018-11-09

## 2018-11-09 DIAGNOSIS — E11.9 TYPE 2 DIABETES MELLITUS WITHOUT COMPLICATION, WITHOUT LONG-TERM CURRENT USE OF INSULIN (HCC): Primary | ICD-10-CM

## 2018-11-09 RX ORDER — BLOOD-GLUCOSE METER
1 KIT MISCELLANEOUS DAILY
Qty: 1 KIT | Refills: 0 | Status: SHIPPED | OUTPATIENT
Start: 2018-11-09 | End: 2018-12-17 | Stop reason: CLARIF

## 2018-11-09 RX ORDER — LANCETS 28 GAUGE
1 EACH MISCELLANEOUS DAILY
Qty: 100 EACH | Refills: 3 | Status: SHIPPED | OUTPATIENT
Start: 2018-11-09 | End: 2018-12-17 | Stop reason: CLARIF

## 2018-11-26 DIAGNOSIS — E11.9 TYPE 2 DIABETES MELLITUS WITHOUT COMPLICATION, WITHOUT LONG-TERM CURRENT USE OF INSULIN (HCC): ICD-10-CM

## 2018-11-26 DIAGNOSIS — E11.9 TYPE 2 DIABETES MELLITUS WITHOUT COMPLICATION, WITHOUT LONG-TERM CURRENT USE OF INSULIN (HCC): Primary | ICD-10-CM

## 2018-11-26 RX ORDER — LANCETS 30 GAUGE
1 EACH MISCELLANEOUS 2 TIMES DAILY
Qty: 100 EACH | Refills: 3 | Status: SHIPPED
Start: 2018-11-26 | End: 2020-02-21 | Stop reason: SDUPTHER

## 2018-12-17 ENCOUNTER — OFFICE VISIT (OUTPATIENT)
Dept: FAMILY MEDICINE CLINIC | Age: 63
End: 2018-12-17
Payer: MEDICARE

## 2018-12-17 VITALS
TEMPERATURE: 97.8 F | OXYGEN SATURATION: 96 % | BODY MASS INDEX: 29.59 KG/M2 | SYSTOLIC BLOOD PRESSURE: 132 MMHG | WEIGHT: 167 LBS | DIASTOLIC BLOOD PRESSURE: 68 MMHG | RESPIRATION RATE: 19 BRPM | HEART RATE: 92 BPM | HEIGHT: 63 IN

## 2018-12-17 DIAGNOSIS — F33.2 SEVERE EPISODE OF RECURRENT MAJOR DEPRESSIVE DISORDER, WITHOUT PSYCHOTIC FEATURES (HCC): ICD-10-CM

## 2018-12-17 DIAGNOSIS — E78.2 MIXED HYPERLIPIDEMIA: ICD-10-CM

## 2018-12-17 DIAGNOSIS — I10 ESSENTIAL HYPERTENSION: ICD-10-CM

## 2018-12-17 DIAGNOSIS — E89.0 POSTOPERATIVE HYPOTHYROIDISM: ICD-10-CM

## 2018-12-17 DIAGNOSIS — E11.9 TYPE 2 DIABETES MELLITUS WITHOUT COMPLICATION, WITHOUT LONG-TERM CURRENT USE OF INSULIN (HCC): Primary | ICD-10-CM

## 2018-12-17 PROBLEM — R25.2 LEG CRAMPS: Status: RESOLVED | Noted: 2018-05-07 | Resolved: 2018-12-17
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
## 2018-12-17 PROBLEM — E66.8 MODERATE OBESITY: Status: RESOLVED | Noted: 2017-02-28 | Resolved: 2018-12-17

## 2018-12-17 PROBLEM — F17.210 CIGARETTE SMOKER TWO PACKS A DAY OR LESS: Status: RESOLVED | Noted: 2017-01-16 | Resolved: 2018-12-17

## 2018-12-17 PROBLEM — K21.00 GASTROESOPHAGEAL REFLUX DISEASE WITH ESOPHAGITIS: Status: RESOLVED | Noted: 2018-05-07 | Resolved: 2018-12-17

## 2018-12-17 PROBLEM — R63.5 WEIGHT GAIN: Status: RESOLVED | Noted: 2018-04-16 | Resolved: 2018-12-17

## 2018-12-17 PROBLEM — M25.519 NONTRAUMATIC SHOULDER PAIN: Status: RESOLVED | Noted: 2017-01-04 | Resolved: 2018-12-17
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
## 2018-12-17 PROBLEM — H61.90 LESION OF EARLOBE: Status: RESOLVED | Noted: 2017-01-16 | Resolved: 2018-12-17

## 2018-12-17 PROBLEM — N95.1 HOT FLASH, MENOPAUSAL: Status: RESOLVED | Noted: 2017-12-13 | Resolved: 2018-12-17

## 2018-12-17 PROBLEM — R04.0 MILD EPISTAXIS: Status: RESOLVED | Noted: 2017-12-13 | Resolved: 2018-12-17

## 2018-12-17 PROBLEM — E66.9 MODERATE OBESITY: Status: RESOLVED | Noted: 2017-02-28 | Resolved: 2018-12-17

## 2018-12-17 PROBLEM — E04.9 GOITER: Status: RESOLVED | Noted: 2017-01-04 | Resolved: 2018-12-17

## 2018-12-17 PROBLEM — M25.60 LIMITED JOINT RANGE OF MOTION (ROM): Status: RESOLVED | Noted: 2017-01-04 | Resolved: 2018-12-17
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
## 2018-12-17 LAB
CREATININE URINE POCT: NORMAL
GLUCOSE BLD-MCNC: 142 MG/DL
HBA1C MFR BLD: 6.9 %
MICROALBUMIN/CREAT 24H UR: NORMAL MG/G{CREAT}
MICROALBUMIN/CREAT UR-RTO: NORMAL

## 2018-12-17 PROCEDURE — 83036 HEMOGLOBIN GLYCOSYLATED A1C: CPT | Performed by: NURSE PRACTITIONER

## 2018-12-17 PROCEDURE — G8427 DOCREV CUR MEDS BY ELIG CLIN: HCPCS | Performed by: NURSE PRACTITIONER

## 2018-12-17 PROCEDURE — 1090F PRES/ABSN URINE INCON ASSESS: CPT | Performed by: NURSE PRACTITIONER

## 2018-12-17 PROCEDURE — G0444 DEPRESSION SCREEN ANNUAL: HCPCS | Performed by: NURSE PRACTITIONER

## 2018-12-17 PROCEDURE — 82962 GLUCOSE BLOOD TEST: CPT | Performed by: NURSE PRACTITIONER

## 2018-12-17 PROCEDURE — G8400 PT W/DXA NO RESULTS DOC: HCPCS | Performed by: NURSE PRACTITIONER

## 2018-12-17 PROCEDURE — 4040F PNEUMOC VAC/ADMIN/RCVD: CPT | Performed by: NURSE PRACTITIONER

## 2018-12-17 PROCEDURE — 99214 OFFICE O/P EST MOD 30 MIN: CPT | Performed by: NURSE PRACTITIONER

## 2018-12-17 PROCEDURE — 3017F COLORECTAL CA SCREEN DOC REV: CPT | Performed by: NURSE PRACTITIONER

## 2018-12-17 PROCEDURE — 1036F TOBACCO NON-USER: CPT | Performed by: NURSE PRACTITIONER

## 2018-12-17 PROCEDURE — G8417 CALC BMI ABV UP PARAM F/U: HCPCS | Performed by: NURSE PRACTITIONER

## 2018-12-17 PROCEDURE — 1101F PT FALLS ASSESS-DOCD LE1/YR: CPT | Performed by: NURSE PRACTITIONER

## 2018-12-17 PROCEDURE — 82044 UR ALBUMIN SEMIQUANTITATIVE: CPT | Performed by: NURSE PRACTITIONER

## 2018-12-17 PROCEDURE — G8482 FLU IMMUNIZE ORDER/ADMIN: HCPCS | Performed by: NURSE PRACTITIONER

## 2018-12-17 PROCEDURE — 2022F DILAT RTA XM EVC RTNOPTHY: CPT | Performed by: NURSE PRACTITIONER

## 2018-12-17 PROCEDURE — 3044F HG A1C LEVEL LT 7.0%: CPT | Performed by: NURSE PRACTITIONER

## 2018-12-17 PROCEDURE — 1123F ACP DISCUSS/DSCN MKR DOCD: CPT | Performed by: NURSE PRACTITIONER

## 2018-12-17 RX ORDER — VENLAFAXINE HYDROCHLORIDE 37.5 MG/1
37.5 CAPSULE, EXTENDED RELEASE ORAL DAILY
Qty: 30 CAPSULE | Refills: 0 | Status: SHIPPED | OUTPATIENT
Start: 2018-12-17 | End: 2019-01-08 | Stop reason: SDUPTHER

## 2018-12-17 RX ORDER — PEN NEEDLE, DIABETIC 32GX 5/32"
1 NEEDLE, DISPOSABLE MISCELLANEOUS DAILY
Qty: 100 EACH | Refills: 3 | Status: SHIPPED | OUTPATIENT
Start: 2018-12-17 | End: 2019-11-19 | Stop reason: SDUPTHER

## 2018-12-17 RX ORDER — HYDROCHLOROTHIAZIDE 25 MG/1
25 TABLET ORAL DAILY
Qty: 30 TABLET | Refills: 3 | Status: SHIPPED | OUTPATIENT
Start: 2018-12-17 | End: 2019-05-02 | Stop reason: SDUPTHER

## 2018-12-17 ASSESSMENT — PATIENT HEALTH QUESTIONNAIRE - PHQ9
4. FEELING TIRED OR HAVING LITTLE ENERGY: 3
5. POOR APPETITE OR OVEREATING: 2
SUM OF ALL RESPONSES TO PHQ9 QUESTIONS 1 & 2: 6
3. TROUBLE FALLING OR STAYING ASLEEP: 3
7. TROUBLE CONCENTRATING ON THINGS, SUCH AS READING THE NEWSPAPER OR WATCHING TELEVISION: 0
SUM OF ALL RESPONSES TO PHQ QUESTIONS 1-9: 15
1. LITTLE INTEREST OR PLEASURE IN DOING THINGS: 3
10. IF YOU CHECKED OFF ANY PROBLEMS, HOW DIFFICULT HAVE THESE PROBLEMS MADE IT FOR YOU TO DO YOUR WORK, TAKE CARE OF THINGS AT HOME, OR GET ALONG WITH OTHER PEOPLE: 1
9. THOUGHTS THAT YOU WOULD BE BETTER OFF DEAD, OR OF HURTING YOURSELF: 0
6. FEELING BAD ABOUT YOURSELF - OR THAT YOU ARE A FAILURE OR HAVE LET YOURSELF OR YOUR FAMILY DOWN: 1
8. MOVING OR SPEAKING SO SLOWLY THAT OTHER PEOPLE COULD HAVE NOTICED. OR THE OPPOSITE, BEING SO FIGETY OR RESTLESS THAT YOU HAVE BEEN MOVING AROUND A LOT MORE THAN USUAL: 0
SUM OF ALL RESPONSES TO PHQ QUESTIONS 1-9: 15
2. FEELING DOWN, DEPRESSED OR HOPELESS: 3

## 2018-12-17 ASSESSMENT — ENCOUNTER SYMPTOMS
DIARRHEA: 0
WHEEZING: 0
CHEST TIGHTNESS: 0
SORE THROAT: 0
SHORTNESS OF BREATH: 0
FACIAL SWELLING: 0
ABDOMINAL PAIN: 0
TROUBLE SWALLOWING: 0
VOMITING: 0
VOICE CHANGE: 0
COUGH: 0
CONSTIPATION: 0
COLOR CHANGE: 0
BACK PAIN: 0
SINUS PRESSURE: 0
NAUSEA: 0
SINUS PAIN: 0
RHINORRHEA: 0

## 2018-12-17 NOTE — PROGRESS NOTES
disturbance. Respiratory: Negative for cough, chest tightness, shortness of breath and wheezing. Cardiovascular: Negative for chest pain, palpitations and leg swelling. Gastrointestinal: Negative for abdominal pain, constipation, diarrhea, nausea and vomiting. Endocrine: Negative for cold intolerance, heat intolerance, polydipsia, polyphagia and polyuria. Genitourinary: Negative for difficulty urinating, frequency and urgency. Musculoskeletal: Negative for arthralgias, back pain, gait problem, joint swelling, myalgias, neck pain and neck stiffness. Skin: Negative for color change, pallor, rash and wound. Allergic/Immunologic: Negative for environmental allergies, food allergies and immunocompromised state. Neurological: Negative for dizziness, tremors, seizures, syncope, facial asymmetry, speech difficulty, weakness, light-headedness, numbness and headaches. Hematological: Negative for adenopathy. Does not bruise/bleed easily. Psychiatric/Behavioral: Positive for agitation, dysphoric mood and sleep disturbance. Negative for behavioral problems, confusion, decreased concentration, hallucinations, self-injury and suicidal ideas. The patient is nervous/anxious. The patient is not hyperactive.         OBJECTIVE:     VS:  Wt Readings from Last 3 Encounters:   12/17/18 167 lb (75.8 kg)   09/20/18 183 lb (83 kg)   08/15/18 183 lb 1.9 oz (83.1 kg)                       Vitals:    12/17/18 1307   BP: 132/68   Pulse: 92   Resp: 19   Temp: 97.8 °F (36.6 °C)   TempSrc: Temporal   SpO2: 96%   Weight: 167 lb (75.8 kg)   Height: 5' 3\" (1.6 m)       General: Alert and oriented to person, place, and time, well developed and well nourished,overweight,  in no acute distress  SKIN: Warm and dry, intact without any rash, masses or lesions  HEAD: normocephalic, atraumatic  Eyes: sclera/conjunctiva clear, PERRLA, EOMI's intact  ENT: tympanic membranes, external ear and ear canal normal bilaterally, normal hearing, fasting, then next day 2 hours after lunch, next day 2 hours after dinner, next day at bedtime then start over and log all values. Bring log to next appointment  Foot exam every day; wash and dry well between toes, look for any redness, cracks, wounds notify provider if any problems occur  Reminder for annual Eye exam  Reminder for Podiatry visits Q 2 Months for toenail care if needed  Reminder to keep vaccines up dated  Exercise 30 minutes daily  Recommend Diabetic Education Classes if you have not already attended    Severe episode of recurrent major depressive disorder, without psychotic features (HCC) Worsening  -     venlafaxine (EFFEXOR XR) 37.5 MG extended release capsule; Take 1 capsule by mouth daily  After a week can increase to 2 tablets daily  On the basis of positive PHQ-9 screening (PHQ-9 Total Score: 15), the following plan was implemented: medication prescribed: Effexor- XR 37.5 mg daily- patient will call for any significant medication side effects or worsening symptoms of depression. Patient will follow-up in 3 week(s) with PCP   . Essential hypertension stable  -     hydrochlorothiazide (HYDRODIURIL) 25 MG tablet; Take 1 tablet by mouth daily For blood pressure  -     CBC Auto Differential; Future  -     Comprehensive Metabolic Panel; Future  -     Lipid Panel; Future  -Discussed taking medication and directed every day. -Discussed exercising daily 30 minutes 5 times a week for 150 minutes weekly.  -Discussed weight reduction if needed.  -Discussed low sodium diet.  -Discussed limiting caffeine consumption and tobacco cessation and the effects they have on the heart and blood pressure. Not addressed today  Mixed hyperlipidemia  -     Lipid Panel; Future    Postoperative hypothyroidism  -     TSH without Reflex; Future  -     T4, Free; Future    Other orders  -     Cancel:  Insulin Pen Needle ([employer] PEN NEEDLES 29G) 29G X 12MM MISC; 1 each by Does not apply route daily                Return in about 3 weeks (around 1/7/2019) for fasting labs, depression. Discussed weight loss, Discussed exercising 30 minutes daily and Discussed taking medications as directed and adverse effects        I have reviewed my findings and recommendations with Jun Goodman.     Arsalan Newman, NP-C, FNP-BC

## 2018-12-17 NOTE — PATIENT INSTRUCTIONS
depression keeps coming back, you may have to take medicine for the rest of your life. · Continue counseling even after you have stopped taking medicine. · Eat healthy foods. Include fruits, vegetables, beans, and whole grains in your diet each day. · Get at least 30 minutes of exercise on most days of the week. Walking is a good choice. You also may want to do other activities, such as running, swimming, cycling, or playing tennis or team sports. · See your doctor right away if you have new symptoms or feel that your depression is coming back. · Keep a regular sleep schedule. Try for 8 hours of sleep a night. · Avoid alcohol and illegal drugs. · Keep the numbers for these national suicide hotlines: 8-833-427-TALK (8-474.866.4377) and 2-683-LHCILAA (6-301.270.1609). If you or someone you know talks about suicide or feeling hopeless, get help right away. When should you call for help? Call 911 anytime you think you may need emergency care.  For example, call if:    · You are thinking about suicide or are threatening suicide.     · You feel you cannot stop from hurting yourself or someone else.     · You hear or see things that aren't real.     · You think or speak in a bizarre way that is not like your usual behavior.    Call your doctor now or seek immediate medical care if:    · You are drinking a lot of alcohol or using illegal drugs.     · You are talking or writing about death.    Watch closely for changes in your health, and be sure to contact your doctor if:    · You find it hard or it's getting harder to deal with school, a job, family, or friends.     · You think your treatment is not helping or you are not getting better.     · Your symptoms get worse or you get new symptoms.     · You have any problems with your antidepressant medicines, such as side effects, or you are thinking about stopping your medicine.     · You are having manic behavior, such as having very high energy, needing less sleep change, try to wait a bit. Talk it over with friends and loved ones who can help you look at the overall picture first.  · Reaching out to people for help is important. Do not isolate yourself. Let your family and friends help you. Find someone you can trust and confide in, and talk to that person. · Be patient, and be kind to yourself. Remember that depression is not your fault and is not something you can overcome with willpower alone. Treatment is necessary for depression, just like for any other illness. Feeling better takes time, and your mood will improve little by little. Stay active  · Stay busy and get outside. Take a walk, or try some other light exercise. · Talk with your doctor about an exercise program. Exercise can help with mild depression. · Go to a movie or concert. Take part in a Mu-ism activity or other social gathering. Go to a [employer] game. · Ask a friend to have dinner with you. Take care of yourself  · Eat a balanced diet with plenty of fresh fruits and vegetables, whole grains, and lean protein. If you have lost your appetite, eat small snacks rather than large meals. · Avoid drinking alcohol or using illegal drugs. Do not take medicines that have not been prescribed for you. They may interfere with medicines you may be taking for depression, or they may make your depression worse. · Take your medicines exactly as they are prescribed. You may start to feel better within 1 to 3 weeks of taking antidepressant medicine. But it can take as many as 6 to 8 weeks to see more improvement. If you have questions or concerns about your medicines, or if you do not notice any improvement by 3 weeks, talk to your doctor. · If you have any side effects from your medicine, tell your doctor. Antidepressants can make you feel tired, dizzy, or nervous. Some people have dry mouth, constipation, headaches, sexual problems, or diarrhea.  Many of these side effects are mild and will go away on their own after at the same time each day. Swallow the extended-release capsule or tablet whole and do not crush, chew, break, or open it. If you cannot swallow a capsule whole, open it and sprinkle the medicine into a spoonful of applesauce. Swallow the mixture right away without chewing. Do not save it for later use. It may take several weeks before your symptoms improve. Keep using the medication as directed. Do not stop using venlafaxine without first talking to your doctor. You may have unpleasant side effects if you stop taking this medicine suddenly. Your blood pressure will need to be checked often. This medicine may affect a drug-screening urine test and you may have false results. Tell the laboratory staff that you use venlafaxine. Store at room temperature away from moisture and heat. What happens if I miss a dose? Take the medicine as soon as you can, but skip the missed dose if it is almost time for your next dose. Do not take two doses at one time. What happens if I overdose? Seek emergency medical attention or call the Poison Help line at 1-989.731.7957. What should I avoid while taking venlafaxine? Drinking alcohol with this medicine can cause side effects. Ask your doctor before taking a nonsteroidal anti-inflammatory drug (NSAID) such as aspirin, ibuprofen (Advil, Motrin), naproxen (Aleve), celecoxib (Celebrex), diclofenac, indomethacin, meloxicam, and others. Using an NSAID with venlafaxine may cause you to bruise or bleed easily. Avoid driving or hazardous activity until you know how this medicine will affect you. Your reactions could be impaired. What are the possible side effects of venlafaxine? Get emergency medical help if you have signs of an allergic reaction: skin rash or hives; difficulty breathing; swelling of your face, lips, tongue, or throat.   Report any new or worsening symptoms to your doctor, such as: mood or behavior changes, anxiety, panic attacks, trouble sleeping, or if you feel impulsive, irritable, agitated, hostile, aggressive, restless, hyperactive (mentally or physically), more depressed, or have thoughts about suicide or hurting yourself. Call your doctor at once if you have:  · blurred vision, tunnel vision, eye pain or swelling, or seeing halos around lights;  · easy bruising or bleeding (nosebleeds, bleeding gums), blood in your urine or stools, coughing up blood;  · cough, chest tightness, trouble breathing;  · a seizure (convulsions);  · low sodium level  --headache, confusion, slurred speech, severe weakness, vomiting, loss of coordination, feeling unsteady; or  · severe nervous system reaction --very stiff (rigid) muscles, high fever, sweating, confusion, fast or uneven heartbeats, tremors, feeling like you might pass out. Seek medical attention right away if you have symptoms of serotonin syndrome, such as: agitation, hallucinations, fever, sweating, shivering, fast heart rate, muscle stiffness, twitching, loss of coordination, nausea, vomiting, or diarrhea  Common side effects may include:  · dizziness, drowsiness,  · anxiety, feeling nervous;  · sleep problems (insomnia);  · vision changes;  · nausea, vomiting, diarrhea;  · changes in weight or appetite;  · dry mouth, yawning;  · increased sweating; or  · decreased sex drive, impotence, abnormal ejaculation, difficulty having an orgasm. This is not a complete list of side effects and others may occur. Call your doctor for medical advice about side effects. You may report side effects to FDA at 5-592-FDA-6212. What other drugs will affect venlafaxine? Using venlafaxine with other drugs that make you drowsy can worsen this effect. Ask your doctor before using opioid medication, a sleeping pill, a muscle relaxer, or medicine for anxiety or seizures. Tell your doctor about all your current medicines.  Many drugs can affect venlafaxine, especially:  · any other antidepressant;  · cimetidine;  · Gretchen's

## 2019-01-07 ENCOUNTER — NURSE ONLY (OUTPATIENT)
Dept: FAMILY MEDICINE CLINIC | Age: 64
End: 2019-01-07
Payer: MEDICARE

## 2019-01-07 ENCOUNTER — HOSPITAL ENCOUNTER (OUTPATIENT)
Age: 64
Discharge: HOME | End: 2019-01-09
Payer: MEDICARE

## 2019-01-07 DIAGNOSIS — E89.0 POSTOPERATIVE HYPOTHYROIDISM: ICD-10-CM

## 2019-01-07 DIAGNOSIS — E78.2 MIXED HYPERLIPIDEMIA: ICD-10-CM

## 2019-01-07 DIAGNOSIS — E11.9 TYPE 2 DIABETES MELLITUS WITHOUT COMPLICATION, WITHOUT LONG-TERM CURRENT USE OF INSULIN (HCC): ICD-10-CM

## 2019-01-07 DIAGNOSIS — Z84.81 FAMILY HISTORY OF BRCA GENE POSITIVE: ICD-10-CM

## 2019-01-07 DIAGNOSIS — E55.9 VITAMIN D DEFICIENCY: ICD-10-CM

## 2019-01-07 DIAGNOSIS — F33.2 SEVERE EPISODE OF RECURRENT MAJOR DEPRESSIVE DISORDER, WITHOUT PSYCHOTIC FEATURES (HCC): ICD-10-CM

## 2019-01-07 DIAGNOSIS — I10 ESSENTIAL HYPERTENSION: ICD-10-CM

## 2019-01-07 LAB
ALBUMIN SERPL-MCNC: 4 G/DL (ref 3.5–5.2)
ALP BLD-CCNC: 77 U/L (ref 35–104)
ALT SERPL-CCNC: 15 U/L (ref 0–32)
ANION GAP SERPL CALCULATED.3IONS-SCNC: 18 MMOL/L (ref 7–16)
AST SERPL-CCNC: 18 U/L (ref 0–31)
BASOPHILS ABSOLUTE: 0.06 E9/L (ref 0–0.2)
BASOPHILS RELATIVE PERCENT: 0.7 % (ref 0–2)
BILIRUB SERPL-MCNC: 0.5 MG/DL (ref 0–1.2)
BUN BLDV-MCNC: 8 MG/DL (ref 8–23)
CALCIUM SERPL-MCNC: 9.3 MG/DL (ref 8.6–10.2)
CHLORIDE BLD-SCNC: 95 MMOL/L (ref 98–107)
CHOLESTEROL, TOTAL: 122 MG/DL (ref 0–199)
CO2: 25 MMOL/L (ref 22–29)
CREAT SERPL-MCNC: 0.8 MG/DL (ref 0.5–1)
EOSINOPHILS ABSOLUTE: 0.16 E9/L (ref 0.05–0.5)
EOSINOPHILS RELATIVE PERCENT: 1.8 % (ref 0–6)
GFR AFRICAN AMERICAN: >60
GFR NON-AFRICAN AMERICAN: >60 ML/MIN/1.73
GLUCOSE BLD-MCNC: 134 MG/DL (ref 74–99)
HCT VFR BLD CALC: 49.4 % (ref 34–48)
HDLC SERPL-MCNC: 37 MG/DL
HEMOGLOBIN: 15.8 G/DL (ref 11.5–15.5)
IMMATURE GRANULOCYTES #: 0.03 E9/L
IMMATURE GRANULOCYTES %: 0.3 % (ref 0–5)
LDL CHOLESTEROL CALCULATED: 55 MG/DL (ref 0–99)
LYMPHOCYTES ABSOLUTE: 1.54 E9/L (ref 1.5–4)
LYMPHOCYTES RELATIVE PERCENT: 17 % (ref 20–42)
MCH RBC QN AUTO: 27.8 PG (ref 26–35)
MCHC RBC AUTO-ENTMCNC: 32 % (ref 32–34.5)
MCV RBC AUTO: 86.8 FL (ref 80–99.9)
MONOCYTES ABSOLUTE: 0.58 E9/L (ref 0.1–0.95)
MONOCYTES RELATIVE PERCENT: 6.4 % (ref 2–12)
NEUTROPHILS ABSOLUTE: 6.69 E9/L (ref 1.8–7.3)
NEUTROPHILS RELATIVE PERCENT: 73.8 % (ref 43–80)
PDW BLD-RTO: 15.1 FL (ref 11.5–15)
PLATELET # BLD: 375 E9/L (ref 130–450)
PMV BLD AUTO: 9.6 FL (ref 7–12)
POTASSIUM SERPL-SCNC: 3.7 MMOL/L (ref 3.5–5)
RBC # BLD: 5.69 E12/L (ref 3.5–5.5)
SODIUM BLD-SCNC: 138 MMOL/L (ref 132–146)
T4 FREE: 2.19 NG/DL (ref 0.93–1.7)
TOTAL PROTEIN: 6.9 G/DL (ref 6.4–8.3)
TRIGL SERPL-MCNC: 149 MG/DL (ref 0–149)
TSH SERPL DL<=0.05 MIU/L-ACNC: 0.02 UIU/ML (ref 0.27–4.2)
VLDLC SERPL CALC-MCNC: 30 MG/DL
WBC # BLD: 9.1 E9/L (ref 4.5–11.5)

## 2019-01-07 PROCEDURE — 84439 ASSAY OF FREE THYROXINE: CPT

## 2019-01-07 PROCEDURE — 85025 COMPLETE CBC W/AUTO DIFF WBC: CPT

## 2019-01-07 PROCEDURE — 84443 ASSAY THYROID STIM HORMONE: CPT

## 2019-01-07 PROCEDURE — 80053 COMPREHEN METABOLIC PANEL: CPT

## 2019-01-07 PROCEDURE — 36415 COLL VENOUS BLD VENIPUNCTURE: CPT | Performed by: FAMILY MEDICINE

## 2019-01-07 PROCEDURE — 80061 LIPID PANEL: CPT

## 2019-01-08 DIAGNOSIS — F33.2 SEVERE EPISODE OF RECURRENT MAJOR DEPRESSIVE DISORDER, WITHOUT PSYCHOTIC FEATURES (HCC): ICD-10-CM

## 2019-01-08 RX ORDER — VENLAFAXINE HYDROCHLORIDE 37.5 MG/1
37.5 CAPSULE, EXTENDED RELEASE ORAL DAILY
Qty: 30 CAPSULE | Refills: 3 | OUTPATIENT
Start: 2019-01-08

## 2019-01-08 RX ORDER — VENLAFAXINE HYDROCHLORIDE 37.5 MG/1
37.5 CAPSULE, EXTENDED RELEASE ORAL DAILY
Qty: 30 CAPSULE | Refills: 0 | Status: SHIPPED | OUTPATIENT
Start: 2019-01-08 | End: 2019-01-11

## 2019-01-11 ENCOUNTER — OFFICE VISIT (OUTPATIENT)
Dept: FAMILY MEDICINE CLINIC | Age: 64
End: 2019-01-11
Payer: MEDICARE

## 2019-01-11 VITALS
DIASTOLIC BLOOD PRESSURE: 62 MMHG | TEMPERATURE: 97.7 F | HEIGHT: 63 IN | RESPIRATION RATE: 20 BRPM | BODY MASS INDEX: 29.06 KG/M2 | OXYGEN SATURATION: 94 % | WEIGHT: 164 LBS | HEART RATE: 104 BPM | SYSTOLIC BLOOD PRESSURE: 130 MMHG

## 2019-01-11 DIAGNOSIS — Z00.00 ROUTINE GENERAL MEDICAL EXAMINATION AT A HEALTH CARE FACILITY: Primary | ICD-10-CM

## 2019-01-11 DIAGNOSIS — Z78.0 ASYMPTOMATIC MENOPAUSAL STATE: ICD-10-CM

## 2019-01-11 DIAGNOSIS — D58.2 ELEVATED HEMOGLOBIN (HCC): ICD-10-CM

## 2019-01-11 DIAGNOSIS — E89.0 POSTOPERATIVE HYPOTHYROIDISM: ICD-10-CM

## 2019-01-11 PROCEDURE — G0438 PPPS, INITIAL VISIT: HCPCS | Performed by: NURSE PRACTITIONER

## 2019-01-11 PROCEDURE — 4040F PNEUMOC VAC/ADMIN/RCVD: CPT | Performed by: NURSE PRACTITIONER

## 2019-01-11 PROCEDURE — G8482 FLU IMMUNIZE ORDER/ADMIN: HCPCS | Performed by: NURSE PRACTITIONER

## 2019-01-11 RX ORDER — VENLAFAXINE HYDROCHLORIDE 75 MG/1
CAPSULE, EXTENDED RELEASE ORAL
COMMUNITY
Start: 2019-01-08 | End: 2019-02-03 | Stop reason: SDUPTHER

## 2019-01-11 RX ORDER — LEVOTHYROXINE SODIUM 112 UG/1
112 TABLET ORAL DAILY
Qty: 30 TABLET | Refills: 1 | Status: SHIPPED | OUTPATIENT
Start: 2019-01-11 | End: 2019-02-15 | Stop reason: SDUPTHER

## 2019-01-11 ASSESSMENT — LIFESTYLE VARIABLES: HOW OFTEN DO YOU HAVE A DRINK CONTAINING ALCOHOL: 0

## 2019-01-11 ASSESSMENT — PATIENT HEALTH QUESTIONNAIRE - PHQ9
SUM OF ALL RESPONSES TO PHQ QUESTIONS 1-9: 0
SUM OF ALL RESPONSES TO PHQ QUESTIONS 1-9: 0

## 2019-01-11 ASSESSMENT — ANXIETY QUESTIONNAIRES: GAD7 TOTAL SCORE: 0

## 2019-02-14 ENCOUNTER — NURSE ONLY (OUTPATIENT)
Dept: FAMILY MEDICINE CLINIC | Age: 64
End: 2019-02-14
Payer: MEDICARE

## 2019-02-14 ENCOUNTER — HOSPITAL ENCOUNTER (OUTPATIENT)
Age: 64
Discharge: HOME | End: 2019-02-16
Payer: MEDICARE

## 2019-02-14 DIAGNOSIS — E11.9 TYPE 2 DIABETES MELLITUS WITHOUT COMPLICATION, WITHOUT LONG-TERM CURRENT USE OF INSULIN (HCC): ICD-10-CM

## 2019-02-14 DIAGNOSIS — D58.2 ELEVATED HEMOGLOBIN (HCC): ICD-10-CM

## 2019-02-14 DIAGNOSIS — E78.2 MIXED HYPERLIPIDEMIA: ICD-10-CM

## 2019-02-14 DIAGNOSIS — E89.0 POSTOPERATIVE HYPOTHYROIDISM: ICD-10-CM

## 2019-02-14 DIAGNOSIS — E55.9 VITAMIN D DEFICIENCY: ICD-10-CM

## 2019-02-14 LAB
BASOPHILS ABSOLUTE: 0.05 E9/L (ref 0–0.2)
BASOPHILS RELATIVE PERCENT: 0.5 % (ref 0–2)
EOSINOPHILS ABSOLUTE: 0.11 E9/L (ref 0.05–0.5)
EOSINOPHILS RELATIVE PERCENT: 1.2 % (ref 0–6)
HCT VFR BLD CALC: 50.8 % (ref 34–48)
HEMOGLOBIN: 16.6 G/DL (ref 11.5–15.5)
IMMATURE GRANULOCYTES #: 0.03 E9/L
IMMATURE GRANULOCYTES %: 0.3 % (ref 0–5)
LYMPHOCYTES ABSOLUTE: 1.91 E9/L (ref 1.5–4)
LYMPHOCYTES RELATIVE PERCENT: 20.9 % (ref 20–42)
MCH RBC QN AUTO: 28.8 PG (ref 26–35)
MCHC RBC AUTO-ENTMCNC: 32.7 % (ref 32–34.5)
MCV RBC AUTO: 88.2 FL (ref 80–99.9)
MONOCYTES ABSOLUTE: 0.68 E9/L (ref 0.1–0.95)
MONOCYTES RELATIVE PERCENT: 7.5 % (ref 2–12)
NEUTROPHILS ABSOLUTE: 6.34 E9/L (ref 1.8–7.3)
NEUTROPHILS RELATIVE PERCENT: 69.6 % (ref 43–80)
PDW BLD-RTO: 14.9 FL (ref 11.5–15)
PLATELET # BLD: 356 E9/L (ref 130–450)
PMV BLD AUTO: 9.9 FL (ref 7–12)
RBC # BLD: 5.76 E12/L (ref 3.5–5.5)
T4 FREE: 2.22 NG/DL (ref 0.93–1.7)
TSH SERPL DL<=0.05 MIU/L-ACNC: 0.02 UIU/ML (ref 0.27–4.2)
WBC # BLD: 9.1 E9/L (ref 4.5–11.5)

## 2019-02-14 PROCEDURE — 84443 ASSAY THYROID STIM HORMONE: CPT

## 2019-02-14 PROCEDURE — 36415 COLL VENOUS BLD VENIPUNCTURE: CPT | Performed by: NURSE PRACTITIONER

## 2019-02-14 PROCEDURE — 84439 ASSAY OF FREE THYROXINE: CPT

## 2019-02-14 PROCEDURE — 85025 COMPLETE CBC W/AUTO DIFF WBC: CPT

## 2019-02-15 DIAGNOSIS — E89.0 POSTOPERATIVE HYPOTHYROIDISM: ICD-10-CM

## 2019-02-15 LAB — PATHOLOGIST REVIEW: NORMAL

## 2019-02-15 RX ORDER — LEVOTHYROXINE SODIUM 0.1 MG/1
100 TABLET ORAL DAILY
Qty: 30 TABLET | Refills: 1 | Status: SHIPPED | OUTPATIENT
Start: 2019-02-15 | End: 2019-02-22 | Stop reason: SDUPTHER

## 2019-02-22 ENCOUNTER — OFFICE VISIT (OUTPATIENT)
Dept: FAMILY MEDICINE CLINIC | Age: 64
End: 2019-02-22
Payer: MEDICARE

## 2019-02-22 VITALS
WEIGHT: 158.5 LBS | RESPIRATION RATE: 17 BRPM | DIASTOLIC BLOOD PRESSURE: 88 MMHG | TEMPERATURE: 97.9 F | BODY MASS INDEX: 28.08 KG/M2 | OXYGEN SATURATION: 93 % | HEIGHT: 63 IN | HEART RATE: 83 BPM | SYSTOLIC BLOOD PRESSURE: 122 MMHG

## 2019-02-22 DIAGNOSIS — Z23 NEED FOR VACCINATION AGAINST STREPTOCOCCUS PNEUMONIAE: ICD-10-CM

## 2019-02-22 DIAGNOSIS — R71.8 ELEVATED RED BLOOD CELL COUNT: Primary | ICD-10-CM

## 2019-02-22 DIAGNOSIS — Z12.11 ENCOUNTER FOR SCREENING COLONOSCOPY: ICD-10-CM

## 2019-02-22 DIAGNOSIS — E89.0 POSTOPERATIVE HYPOTHYROIDISM: ICD-10-CM

## 2019-02-22 PROCEDURE — 99213 OFFICE O/P EST LOW 20 MIN: CPT | Performed by: NURSE PRACTITIONER

## 2019-02-22 PROCEDURE — 4040F PNEUMOC VAC/ADMIN/RCVD: CPT | Performed by: NURSE PRACTITIONER

## 2019-02-22 PROCEDURE — 90670 PCV13 VACCINE IM: CPT | Performed by: NURSE PRACTITIONER

## 2019-02-22 PROCEDURE — 3017F COLORECTAL CA SCREEN DOC REV: CPT | Performed by: NURSE PRACTITIONER

## 2019-02-22 PROCEDURE — G8482 FLU IMMUNIZE ORDER/ADMIN: HCPCS | Performed by: NURSE PRACTITIONER

## 2019-02-22 PROCEDURE — 1101F PT FALLS ASSESS-DOCD LE1/YR: CPT | Performed by: NURSE PRACTITIONER

## 2019-02-22 PROCEDURE — G0009 ADMIN PNEUMOCOCCAL VACCINE: HCPCS | Performed by: NURSE PRACTITIONER

## 2019-02-22 PROCEDURE — G8400 PT W/DXA NO RESULTS DOC: HCPCS | Performed by: NURSE PRACTITIONER

## 2019-02-22 PROCEDURE — 1090F PRES/ABSN URINE INCON ASSESS: CPT | Performed by: NURSE PRACTITIONER

## 2019-02-22 PROCEDURE — 1036F TOBACCO NON-USER: CPT | Performed by: NURSE PRACTITIONER

## 2019-02-22 PROCEDURE — G8427 DOCREV CUR MEDS BY ELIG CLIN: HCPCS | Performed by: NURSE PRACTITIONER

## 2019-02-22 PROCEDURE — G8417 CALC BMI ABV UP PARAM F/U: HCPCS | Performed by: NURSE PRACTITIONER

## 2019-02-22 PROCEDURE — 1123F ACP DISCUSS/DSCN MKR DOCD: CPT | Performed by: NURSE PRACTITIONER

## 2019-02-22 RX ORDER — LEVOTHYROXINE SODIUM 137 UG/1
137 TABLET ORAL DAILY
Qty: 30 TABLET | Refills: 0 | Status: SHIPPED | OUTPATIENT
Start: 2019-02-22 | End: 2019-03-23 | Stop reason: SDUPTHER

## 2019-02-22 ASSESSMENT — ENCOUNTER SYMPTOMS
SINUS PAIN: 0
SHORTNESS OF BREATH: 0
VOMITING: 0
BACK PAIN: 0
WHEEZING: 0
COLOR CHANGE: 0
CHEST TIGHTNESS: 0
CONSTIPATION: 0
FACIAL SWELLING: 0
RHINORRHEA: 0
ABDOMINAL PAIN: 0
SINUS PRESSURE: 0
DIARRHEA: 0
COUGH: 0
VOICE CHANGE: 0
SORE THROAT: 0
TROUBLE SWALLOWING: 0
NAUSEA: 0

## 2019-04-01 LAB — DIABETIC RETINOPATHY: NEGATIVE

## 2019-04-01 RX ORDER — VENLAFAXINE HYDROCHLORIDE 75 MG/1
CAPSULE, EXTENDED RELEASE ORAL
Qty: 30 CAPSULE | Refills: 1 | Status: SHIPPED | OUTPATIENT
Start: 2019-04-01 | End: 2019-04-05 | Stop reason: SDUPTHER

## 2019-04-05 ENCOUNTER — OFFICE VISIT (OUTPATIENT)
Dept: FAMILY MEDICINE CLINIC | Age: 64
End: 2019-04-05
Payer: MEDICARE

## 2019-04-05 VITALS
DIASTOLIC BLOOD PRESSURE: 66 MMHG | HEART RATE: 78 BPM | HEIGHT: 63 IN | TEMPERATURE: 97.1 F | BODY MASS INDEX: 28.31 KG/M2 | WEIGHT: 159.8 LBS | RESPIRATION RATE: 16 BRPM | SYSTOLIC BLOOD PRESSURE: 122 MMHG | OXYGEN SATURATION: 98 %

## 2019-04-05 DIAGNOSIS — E89.0 POSTOPERATIVE HYPOTHYROIDISM: ICD-10-CM

## 2019-04-05 DIAGNOSIS — Z13.31 NEGATIVE DEPRESSION SCREENING: ICD-10-CM

## 2019-04-05 DIAGNOSIS — E11.9 TYPE 2 DIABETES MELLITUS WITHOUT COMPLICATION, WITHOUT LONG-TERM CURRENT USE OF INSULIN (HCC): Primary | ICD-10-CM

## 2019-04-05 DIAGNOSIS — Z12.11 SCREENING FOR COLON CANCER: ICD-10-CM

## 2019-04-05 DIAGNOSIS — F32.5 MAJOR DEPRESSIVE DISORDER WITH SINGLE EPISODE, IN FULL REMISSION (HCC): ICD-10-CM

## 2019-04-05 PROBLEM — F33.2 SEVERE EPISODE OF RECURRENT MAJOR DEPRESSIVE DISORDER, WITHOUT PSYCHOTIC FEATURES (HCC): Status: RESOLVED | Noted: 2018-02-14 | Resolved: 2019-04-05

## 2019-04-05 LAB
CHP ED QC CHECK: NORMAL
CONTROL: NORMAL
GLUCOSE BLD-MCNC: 114 MG/DL
HBA1C MFR BLD: 6.5 %
HEMOCCULT STL QL: NORMAL

## 2019-04-05 PROCEDURE — G8427 DOCREV CUR MEDS BY ELIG CLIN: HCPCS | Performed by: NURSE PRACTITIONER

## 2019-04-05 PROCEDURE — 1090F PRES/ABSN URINE INCON ASSESS: CPT | Performed by: NURSE PRACTITIONER

## 2019-04-05 PROCEDURE — 1123F ACP DISCUSS/DSCN MKR DOCD: CPT | Performed by: NURSE PRACTITIONER

## 2019-04-05 PROCEDURE — 82274 ASSAY TEST FOR BLOOD FECAL: CPT | Performed by: NURSE PRACTITIONER

## 2019-04-05 PROCEDURE — G8417 CALC BMI ABV UP PARAM F/U: HCPCS | Performed by: NURSE PRACTITIONER

## 2019-04-05 PROCEDURE — 83036 HEMOGLOBIN GLYCOSYLATED A1C: CPT | Performed by: NURSE PRACTITIONER

## 2019-04-05 PROCEDURE — 1036F TOBACCO NON-USER: CPT | Performed by: NURSE PRACTITIONER

## 2019-04-05 PROCEDURE — 82962 GLUCOSE BLOOD TEST: CPT | Performed by: NURSE PRACTITIONER

## 2019-04-05 PROCEDURE — 4040F PNEUMOC VAC/ADMIN/RCVD: CPT | Performed by: NURSE PRACTITIONER

## 2019-04-05 PROCEDURE — 99213 OFFICE O/P EST LOW 20 MIN: CPT | Performed by: NURSE PRACTITIONER

## 2019-04-05 PROCEDURE — 2022F DILAT RTA XM EVC RTNOPTHY: CPT | Performed by: NURSE PRACTITIONER

## 2019-04-05 PROCEDURE — 3044F HG A1C LEVEL LT 7.0%: CPT | Performed by: NURSE PRACTITIONER

## 2019-04-05 PROCEDURE — G8400 PT W/DXA NO RESULTS DOC: HCPCS | Performed by: NURSE PRACTITIONER

## 2019-04-05 PROCEDURE — 3017F COLORECTAL CA SCREEN DOC REV: CPT | Performed by: NURSE PRACTITIONER

## 2019-04-05 RX ORDER — VENLAFAXINE HYDROCHLORIDE 75 MG/1
CAPSULE, EXTENDED RELEASE ORAL
Qty: 90 CAPSULE | Refills: 3 | Status: SHIPPED
Start: 2019-04-05 | End: 2020-02-19 | Stop reason: SDUPTHER

## 2019-04-05 ASSESSMENT — ENCOUNTER SYMPTOMS
DIARRHEA: 0
VOMITING: 0
SINUS PRESSURE: 0
COUGH: 0
VOICE CHANGE: 0
ABDOMINAL PAIN: 0
SORE THROAT: 0
COLOR CHANGE: 0
TROUBLE SWALLOWING: 0
SINUS PAIN: 0
BACK PAIN: 0
WHEEZING: 0
CONSTIPATION: 0
CHEST TIGHTNESS: 0
FACIAL SWELLING: 0
NAUSEA: 0
RHINORRHEA: 0
SHORTNESS OF BREATH: 0

## 2019-04-05 ASSESSMENT — PATIENT HEALTH QUESTIONNAIRE - PHQ9
SUM OF ALL RESPONSES TO PHQ QUESTIONS 1-9: 0
SUM OF ALL RESPONSES TO PHQ9 QUESTIONS 1 & 2: 0
SUM OF ALL RESPONSES TO PHQ QUESTIONS 1-9: 0
2. FEELING DOWN, DEPRESSED OR HOPELESS: 0
1. LITTLE INTEREST OR PLEASURE IN DOING THINGS: 0

## 2019-04-05 NOTE — PROGRESS NOTES
OFFICE PROGRESS NOTE  [address] [facility] PRIMARY CARE  [address] [facility] [address]  Dept: [phone]   Chief Complaint   Patient presents with    1 Month Follow-Up     thyroid         HPI:     70 y.o. female presents today for follow-up of diabetes mellitius. In-office bloodsugar is:   Results for POC orders placed in visit on 04/05/19   POCT glycosylated hemoglobin (Hb A1C)   Result Value Ref Range    Hemoglobin A1C 6.5 %   POCT Glucose   Result Value Ref Range    Glucose 114 mg/dL    QC OK? Patient reports being compliant to low carbohydrate diet and increasing weekly exercises. Currently, the patient is treated with medication(s): Victoza 1.8 mg. Patient reports checking home blood sugar 1 times per every couple of days. Patient states home blood sugar ranges from 84 - 156. Patient denies hypoglycemic episodes and understand to take sweetened beverages or a snack if hypoglycemic symptoms are suspected. Hypothyroidism: Patient complains of hypothyroidism. Symptoms include weight changes. Patient denies change in energy level, diarrhea, heat / cold intolerance, nervousness and palpitations. Onset of symptoms was several years ago. Symptoms have stabilized. Depression: Patient complains of depression. She complains of no symptoms at this time. . Onset was approximately several years ago, completely resolved since that time. She denies current suicidal and homicidal plan or intent. Family history significant for no psychiatric illness. Possible organic causes contributing are: none. Risk factors: negative life event taking care of daughter  and previous episode of depression Previous treatment includes Effexor and none. She complains of the following side effects from the treatment: none.     Today's vital signs are as follows:  /66   Pulse 78   Temp 97.1 °F (36.2 °C) (Temporal)   Resp 16   Ht 5' 3\" (1.6 m)   Wt 159 lb 12.8 oz (72.5 kg) LMP 03/01/1978 (Approximate)   SpO2 98%   BMI 28.31 kg/m²     Lab Results   Component Value Date    LABA1C 6.5 04/05/2019     Pain scale: 0/10      Current Outpatient Medications:     venlafaxine (EFFEXOR XR) 75 MG extended release capsule, TAKE 1 CAPSULE BY MOUTH ONCE DAILY, Disp: 90 capsule, Rfl: 3    levothyroxine (SYNTHROID) 137 MCG tablet, TAKE 1 TABLET BY MOUTH ONCE DAILY FOR THYROID, Disp: 30 tablet, Rfl: 0    Liraglutide (VICTOZA) 18 MG/3ML SOPN SC injection, Inject 1.8 mg into the skin daily, Disp: 3 pen, Rfl: 3    hydrochlorothiazide (HYDRODIURIL) 25 MG tablet, Take 1 tablet by mouth daily For blood pressure, Disp: 30 tablet, Rfl: 3    BD PEN NEEDLE SHANEL U/F 32G X 4 MM MISC, Inject 1 each into the skin daily, Disp: 100 each, Rfl: 3    blood glucose test strips (EXACTECH TEST) strip, 1 each by In Vitro route daily As needed. PLEASE FILL WITH INSURANCE APPROVED. WE DO NOT PRIOR AUTH. DX E11.9, Disp: 100 each, Rfl: 3    blood glucose monitor kit and supplies, Test 2 times a day. Please fill with insurance approved as we will not prior auth. DX:E11.9, Disp: 1 kit, Rfl: 0    Lancets MISC, 1 each by Does not apply route 2 times daily, Disp: 100 each, Rfl: 3    rosuvastatin (CRESTOR) 20 MG tablet, Take 1 tablet by mouth daily For cholesterol, Disp: 30 tablet, Rfl: 6    ibuprofen (ADVIL;MOTRIN) 800 MG tablet, Take 1 tablet by mouth every 6 hours as needed for Pain, Disp: 120 tablet, Rfl: 0    VENTOLIN  (90 Base) MCG/ACT inhaler, INHALE 2 PUFFS INTO THE LUNGS EVERY 6 HOURS AS NEEDED FOR WHEEZING.  RINSE MOUTH AFTER USE, Disp: 18 g, Rfl: 0    Handicap Placard MISC, by Does not apply route Unable to ambulate more than 100 feet without difficulty Expires 1/16/2022, Disp: 1 each, Rfl: 0    aspirin 81 MG tablet, Take 81 mg by mouth daily, Disp: , Rfl:   Social History     Socioeconomic History    Marital status: Single     Spouse name: None    Number of children: None    Years of education: None    Highest education level: None   Occupational History    None   Social Needs    Financial resource strain: None    Food insecurity:     Worry: None     Inability: None    Transportation needs:     Medical: None     Non-medical: None   Tobacco Use    Smoking status: Former Smoker     Packs/day: 0.25     Years: 5.00     Pack years: 1.25     Types: Cigarettes     Start date: 3/20/2017     Last attempt to quit: 10/25/2017     Years since quittin.4    Smokeless tobacco: Never Used    Tobacco comment: 20 years 1 pack last 2 days - also starting to use E cig to cut back on usage. Substance and Sexual Activity    Alcohol use: No     Alcohol/week: 0.0 oz    Drug use: No    Sexual activity: Yes   Lifestyle    Physical activity:     Days per week: None     Minutes per session: None    Stress: None   Relationships    Social connections:     Talks on phone: None     Gets together: None     Attends Evangelical service: None     Active member of club or organization: None     Attends meetings of clubs or organizations: None     Relationship status: None    Intimate partner violence:     Fear of current or ex partner: None     Emotionally abused: None     Physically abused: None     Forced sexual activity: None   Other Topics Concern    None   Social History Narrative    None       I have reviewed Selena's allergies, medications, problem list, medical, social and family history and have updated as needed in the electronic medical record    Review of Systems   Constitutional: Negative for activity change, appetite change, chills, diaphoresis, fatigue, fever and unexpected weight change. HENT: Negative for congestion, dental problem, drooling, ear discharge, ear pain, facial swelling, hearing loss, mouth sores, nosebleeds, postnasal drip, rhinorrhea, sinus pressure, sinus pain, sneezing, sore throat, tinnitus, trouble swallowing and voice change. Eyes: Negative for visual disturbance.    Respiratory: turbinates normal without polyps   Throat: clear, tongue midline, tonsils 1+, drainage, no masses or lesions noted, good dentition  Neck: supple and non-tender without mass, trachea midline, no cervical lymphadenopathy, no bruit, no thyromegaly or nodules  Cardiovascular: regular rate and regular QUYGVS,0/5 systolic at 2nd right intercostal space, throughout the precordium, radiates to axilla, radiates to carotids. No rubs, clicks, or gallop. Distal pulses intact, no carotid bruits. No edema  Pulmonary/Chest: clear to auscultation bilaterally, no wheezes, rales or rhonchi, normal air movement, no respiratory distress  Abdomen: soft, non-tender, non-distended, normal bowel sounds, no masses or hepatosplenomegaly  Musculoskeletal: Normal ROM, no joint swelling, deformity or tenderness   Neurologic: reflexes normal and symmetric, no cranial nerve deficit, gait, coordination and speech normal  Extremities: no clubbing, cyanosis, or edema. Psychiatric: Good eye contact, normal mood and affect, answers questions appropriately    Foot Exam:  No signs of infection and/or necrosis noted. Sensation intact and equal bilaterally. Monofilament normal bilateral,  Interdigit spaces exhibit no abnormal skin changes and/or surface growth. ASSESSMENT/PLAN   Fatmata Waldron was seen today for 1 month follow-up. Diagnoses and all orders for this visit:    Type 2 diabetes mellitus without complication, without long-term current use of insulin (HCC) well controlled  -     POCT Glucose  -     POCT glycosylated hemoglobin (Hb A1C)  -POCT diabetic foot exam  Continue current medication at this time  Monitor BS at different times: 1 day fasting, then next day 2 hours after lunch, next day 2 hours after dinner, next day at bedtime then start over and log all values.   Bring log to next appointment  Foot exam every day; wash and dry well between toes, look for any redness, cracks, wounds notify provider if any problems occur  Reminder for annual Eye exam  Reminder for Podiatry visits Q 2 Months for toenail care if needed  Reminder to keep vaccines up dated  Exercise 30 minutes daily    Screening for colon cancer  -     POCT Fit Test; Future  -     POCT Fit Test    Postoperative hypothyroidism  Lab orders given to patient for next week  Continue current medication    Major depressive disorder with single episode, [facility]  -     venlafaxine (EFFEXOR XR) 75 MG extended release capsule; TAKE 1 CAPSULE BY MOUTH ONCE DAILY    Negative depression screening                Return in about 3 months (around 7/5/2019) for DM, HTN, hyperlipidemia. Discussed weight loss, Discussed exercising 30 minutes daily and Discussed taking medications as directed and adverse effects        I have reviewed my findings and recommendations with Marylu Escalante.     Salma Schuler, NP-C, FNP-BC

## 2019-04-09 ENCOUNTER — HOSPITAL ENCOUNTER (OUTPATIENT)
Age: 64
Discharge: HOME | End: 2019-04-11
Payer: MEDICARE

## 2019-04-09 DIAGNOSIS — R71.8 ELEVATED RED BLOOD CELL COUNT: ICD-10-CM

## 2019-04-09 DIAGNOSIS — E89.0 POSTOPERATIVE HYPOTHYROIDISM: ICD-10-CM

## 2019-04-09 LAB
BASOPHILS ABSOLUTE: 0.06 E9/L (ref 0–0.2)
BASOPHILS RELATIVE PERCENT: 0.7 % (ref 0–2)
EOSINOPHILS ABSOLUTE: 0.09 E9/L (ref 0.05–0.5)
EOSINOPHILS RELATIVE PERCENT: 1.1 % (ref 0–6)
HCT VFR BLD CALC: 50.1 % (ref 34–48)
HEMOGLOBIN: 16.3 G/DL (ref 11.5–15.5)
IMMATURE GRANULOCYTES #: 0.03 E9/L
IMMATURE GRANULOCYTES %: 0.4 % (ref 0–5)
LYMPHOCYTES ABSOLUTE: 2.51 E9/L (ref 1.5–4)
LYMPHOCYTES RELATIVE PERCENT: 29.5 % (ref 20–42)
MCH RBC QN AUTO: 28.9 PG (ref 26–35)
MCHC RBC AUTO-ENTMCNC: 32.5 % (ref 32–34.5)
MCV RBC AUTO: 88.8 FL (ref 80–99.9)
MONOCYTES ABSOLUTE: 0.58 E9/L (ref 0.1–0.95)
MONOCYTES RELATIVE PERCENT: 6.8 % (ref 2–12)
NEUTROPHILS ABSOLUTE: 5.24 E9/L (ref 1.8–7.3)
NEUTROPHILS RELATIVE PERCENT: 61.5 % (ref 43–80)
PDW BLD-RTO: 14.6 FL (ref 11.5–15)
PLATELET # BLD: 344 E9/L (ref 130–450)
PMV BLD AUTO: 9.8 FL (ref 7–12)
RBC # BLD: 5.64 E12/L (ref 3.5–5.5)
T4 FREE: 2.35 NG/DL (ref 0.93–1.7)
TSH SERPL DL<=0.05 MIU/L-ACNC: 0.01 UIU/ML (ref 0.27–4.2)
WBC # BLD: 8.5 E9/L (ref 4.5–11.5)

## 2019-04-09 PROCEDURE — 36415 COLL VENOUS BLD VENIPUNCTURE: CPT

## 2019-04-09 PROCEDURE — 85025 COMPLETE CBC W/AUTO DIFF WBC: CPT

## 2019-04-09 PROCEDURE — 84439 ASSAY OF FREE THYROXINE: CPT

## 2019-04-09 PROCEDURE — 84443 ASSAY THYROID STIM HORMONE: CPT

## 2019-04-10 DIAGNOSIS — E89.0 POSTOPERATIVE HYPOTHYROIDISM: ICD-10-CM

## 2019-04-10 RX ORDER — LEVOTHYROXINE SODIUM 0.1 MG/1
100 TABLET ORAL DAILY
Qty: 30 TABLET | Refills: 1 | Status: SHIPPED | OUTPATIENT
Start: 2019-04-10 | End: 2019-07-10 | Stop reason: SDUPTHER

## 2019-04-16 DIAGNOSIS — E78.2 MIXED HYPERLIPIDEMIA: ICD-10-CM

## 2019-04-16 RX ORDER — ROSUVASTATIN CALCIUM 20 MG/1
20 TABLET, COATED ORAL DAILY
Qty: 90 TABLET | Refills: 2 | Status: SHIPPED | OUTPATIENT
Start: 2019-04-16 | End: 2019-12-31 | Stop reason: SDUPTHER

## 2019-05-30 DIAGNOSIS — I10 ESSENTIAL HYPERTENSION: ICD-10-CM

## 2019-06-06 RX ORDER — HYDROCHLOROTHIAZIDE 25 MG/1
TABLET ORAL
Qty: 90 TABLET | Refills: 1 | Status: SHIPPED | OUTPATIENT
Start: 2019-06-06 | End: 2020-01-02 | Stop reason: SDUPTHER

## 2019-07-10 ENCOUNTER — HOSPITAL ENCOUNTER (OUTPATIENT)
Age: 64
Discharge: HOME | End: 2019-07-12
Payer: MEDICARE

## 2019-07-10 ENCOUNTER — OFFICE VISIT (OUTPATIENT)
Dept: FAMILY MEDICINE CLINIC | Age: 64
End: 2019-07-10
Payer: MEDICARE

## 2019-07-10 VITALS
HEIGHT: 63 IN | DIASTOLIC BLOOD PRESSURE: 60 MMHG | HEART RATE: 85 BPM | TEMPERATURE: 97.5 F | WEIGHT: 158.4 LBS | BODY MASS INDEX: 28.07 KG/M2 | SYSTOLIC BLOOD PRESSURE: 112 MMHG | OXYGEN SATURATION: 96 % | RESPIRATION RATE: 16 BRPM

## 2019-07-10 DIAGNOSIS — E78.2 MIXED HYPERLIPIDEMIA: ICD-10-CM

## 2019-07-10 DIAGNOSIS — I10 ESSENTIAL HYPERTENSION: ICD-10-CM

## 2019-07-10 DIAGNOSIS — K21.00 GASTROESOPHAGEAL REFLUX DISEASE WITH ESOPHAGITIS: Primary | ICD-10-CM

## 2019-07-10 DIAGNOSIS — E89.0 POSTOPERATIVE HYPOTHYROIDISM: ICD-10-CM

## 2019-07-10 DIAGNOSIS — D58.2 ELEVATED HEMOGLOBIN (HCC): ICD-10-CM

## 2019-07-10 DIAGNOSIS — E11.9 TYPE 2 DIABETES MELLITUS WITHOUT COMPLICATION, WITHOUT LONG-TERM CURRENT USE OF INSULIN (HCC): ICD-10-CM

## 2019-07-10 LAB
BASOPHILS ABSOLUTE: 0.05 E9/L (ref 0–0.2)
BASOPHILS RELATIVE PERCENT: 0.5 % (ref 0–2)
CHP ED QC CHECK: NORMAL
EOSINOPHILS ABSOLUTE: 0.15 E9/L (ref 0.05–0.5)
EOSINOPHILS RELATIVE PERCENT: 1.5 % (ref 0–6)
GLUCOSE BLD-MCNC: 112 MG/DL
HBA1C MFR BLD: 6.4 %
HCT VFR BLD CALC: 52.8 % (ref 34–48)
HEMOGLOBIN: 17.4 G/DL (ref 11.5–15.5)
IMMATURE GRANULOCYTES #: 0.03 E9/L
IMMATURE GRANULOCYTES %: 0.3 % (ref 0–5)
LYMPHOCYTES ABSOLUTE: 2.19 E9/L (ref 1.5–4)
LYMPHOCYTES RELATIVE PERCENT: 22 % (ref 20–42)
MCH RBC QN AUTO: 29.5 PG (ref 26–35)
MCHC RBC AUTO-ENTMCNC: 33 % (ref 32–34.5)
MCV RBC AUTO: 89.6 FL (ref 80–99.9)
MONOCYTES ABSOLUTE: 0.63 E9/L (ref 0.1–0.95)
MONOCYTES RELATIVE PERCENT: 6.3 % (ref 2–12)
NEUTROPHILS ABSOLUTE: 6.92 E9/L (ref 1.8–7.3)
NEUTROPHILS RELATIVE PERCENT: 69.4 % (ref 43–80)
PDW BLD-RTO: 14.4 FL (ref 11.5–15)
PLATELET # BLD: 346 E9/L (ref 130–450)
PMV BLD AUTO: 9.8 FL (ref 7–12)
RBC # BLD: 5.89 E12/L (ref 3.5–5.5)
T4 FREE: 1.67 NG/DL (ref 0.93–1.7)
TSH SERPL DL<=0.05 MIU/L-ACNC: 0.11 UIU/ML (ref 0.27–4.2)
WBC # BLD: 10 E9/L (ref 4.5–11.5)

## 2019-07-10 PROCEDURE — 3017F COLORECTAL CA SCREEN DOC REV: CPT | Performed by: NURSE PRACTITIONER

## 2019-07-10 PROCEDURE — G8417 CALC BMI ABV UP PARAM F/U: HCPCS | Performed by: NURSE PRACTITIONER

## 2019-07-10 PROCEDURE — 36415 COLL VENOUS BLD VENIPUNCTURE: CPT

## 2019-07-10 PROCEDURE — 4040F PNEUMOC VAC/ADMIN/RCVD: CPT | Performed by: NURSE PRACTITIONER

## 2019-07-10 PROCEDURE — G8400 PT W/DXA NO RESULTS DOC: HCPCS | Performed by: NURSE PRACTITIONER

## 2019-07-10 PROCEDURE — 2022F DILAT RTA XM EVC RTNOPTHY: CPT | Performed by: NURSE PRACTITIONER

## 2019-07-10 PROCEDURE — 83036 HEMOGLOBIN GLYCOSYLATED A1C: CPT | Performed by: NURSE PRACTITIONER

## 2019-07-10 PROCEDURE — 85025 COMPLETE CBC W/AUTO DIFF WBC: CPT

## 2019-07-10 PROCEDURE — 1090F PRES/ABSN URINE INCON ASSESS: CPT | Performed by: NURSE PRACTITIONER

## 2019-07-10 PROCEDURE — 99214 OFFICE O/P EST MOD 30 MIN: CPT | Performed by: NURSE PRACTITIONER

## 2019-07-10 PROCEDURE — 1036F TOBACCO NON-USER: CPT | Performed by: NURSE PRACTITIONER

## 2019-07-10 PROCEDURE — 82962 GLUCOSE BLOOD TEST: CPT | Performed by: NURSE PRACTITIONER

## 2019-07-10 PROCEDURE — 3044F HG A1C LEVEL LT 7.0%: CPT | Performed by: NURSE PRACTITIONER

## 2019-07-10 PROCEDURE — G8427 DOCREV CUR MEDS BY ELIG CLIN: HCPCS | Performed by: NURSE PRACTITIONER

## 2019-07-10 PROCEDURE — 84443 ASSAY THYROID STIM HORMONE: CPT

## 2019-07-10 PROCEDURE — 1123F ACP DISCUSS/DSCN MKR DOCD: CPT | Performed by: NURSE PRACTITIONER

## 2019-07-10 PROCEDURE — 84439 ASSAY OF FREE THYROXINE: CPT

## 2019-07-10 RX ORDER — LEVOTHYROXINE SODIUM 88 UG/1
88 TABLET ORAL DAILY
Qty: 30 TABLET | Refills: 1 | Status: SHIPPED | OUTPATIENT
Start: 2019-07-10 | End: 2019-09-05 | Stop reason: SDUPTHER

## 2019-07-10 RX ORDER — IBUPROFEN 800 MG/1
800 TABLET ORAL EVERY 6 HOURS PRN
Qty: 120 TABLET | Refills: 0 | Status: SHIPPED | OUTPATIENT
Start: 2019-07-10 | End: 2020-02-03 | Stop reason: SDUPTHER

## 2019-07-10 RX ORDER — PANTOPRAZOLE SODIUM 40 MG/1
40 TABLET, DELAYED RELEASE ORAL
Qty: 30 TABLET | Refills: 5 | Status: SHIPPED | OUTPATIENT
Start: 2019-07-10 | End: 2019-08-06 | Stop reason: SDUPTHER

## 2019-07-10 ASSESSMENT — ENCOUNTER SYMPTOMS
BLOOD IN STOOL: 1
COUGH: 0
WHEEZING: 0
SINUS PAIN: 0
VOICE CHANGE: 0
SHORTNESS OF BREATH: 0
COLOR CHANGE: 0
SORE THROAT: 0
DIARRHEA: 1
NAUSEA: 0
FACIAL SWELLING: 0
SINUS PRESSURE: 0
BACK PAIN: 0
ABDOMINAL PAIN: 0
CONSTIPATION: 0
TROUBLE SWALLOWING: 0
CHEST TIGHTNESS: 0
RHINORRHEA: 0
VOMITING: 0

## 2019-07-10 NOTE — PROGRESS NOTES
OFFICE PROGRESS NOTE  [address] [facility] [address]  Dept: [phone]   Chief Complaint   Patient presents with    3 Month Follow-Up     pt is fasting    Hyperlipidemia    Hypertension    Diabetes         HPI:     Patient her today for several things    GERD: Patient complains of heartburn. This has been associated with abdominal bloating, belching and eructation, bilious reflux, difficulty swallowing, early satiety, fullness after meals, heartburn, need to clear throat frequently and did have red blood yesterday with BM has diarrhea, hx of hemorrhoids. She denies chest pain, choking on food, cough, deep pressure at base of neck, dysphagia, hematemesis, hoarseness, laryngitis, epigastric pain, nausea, nocturnal burning, odynophagia, regurgitation of undigested food, shortness of breath, symptoms primarily relate to meals, and lying down after meals, unexpected weight loss, upper abdominal discomfort, water brash and wheezing. Symptoms have been present for 4 months. She denies dysphagia. She has not lost weight. She denies melena, hematochezia, hematemesis, and coffee ground emesis. Medical therapy in the past has included none. 70 y.o. female presents today for follow-up of diabetes mellitius. In-office bloodsugar is:   Results for POC orders placed in visit on 07/10/19   POCT glycosylated hemoglobin (Hb A1C)   Result Value Ref Range    Hemoglobin A1C 6.4 %   POCT Glucose   Result Value Ref Range    Glucose 112 mg/dL    QC OK? Patient reports being compliant to low carbohydrate diet and increasing weekly exercises. Currently, the patient is treated with medication(s): Victoza 1.8 mg daily. Patient reports checking home blood sugar every other day. Patient states home blood sugar ranges from 100 - 124.  Patient denies hypoglycemic episodes and understand to take sweetened beverages or a snack if hypoglycemic symptoms are Today's vital signs are as follows:  /60   Pulse 85   Temp 97.5 °F (36.4 °C) (Temporal)   Resp 16   Ht 5' 3\" (1.6 m)   Wt 158 lb 6.4 oz (71.8 kg)   LMP 03/01/1978 (Approximate)   SpO2 96%   BMI 28.06 kg/m²     Lab Results   Component Value Date    LABA1C 6.4 07/10/2019     Pain scale:0/10    Hypertension: Patient here for follow-up of elevated blood pressure. She is exercising and is adherent to low salt diet. Blood pressure is well controlled at home. Cardiac symptoms none. Patient denies chest pain, chest pressure/discomfort, claudication, dyspnea, exertional chest pressure/discomfort, fatigue, irregular heart beat, lower extremity edema, near-syncope, orthopnea, palpitations, paroxysmal nocturnal dyspnea, syncope and tachypnea. Cardiovascular risk factors: advanced age (older than 54 for men, 72 for women), diabetes mellitus, dyslipidemia, hypertension and smoking/ tobacco exposure. Use of agents associated with hypertension: thyroid hormones. History of target organ damage: none. Hyperlipidemia: Patient presents with hyperlipidemia. She was tested because HTM, DM, hyperlipidemia. Her last labs showed Total cholesterol of 122, HDL 37, LDL 55,  Triglycerides 149. chest pain, dyspnea, exertional chest pressure/discomfort, fatigue, feeding intolerance, lower extremity edema, palpitations, poor exercise tolerance, syncope, tachypnea and skin xanthelasma. There is a family history of hyperlipidemia. There is not a family history of early ischemia heart disease. Hypothyroidism: Patient complains of hypothyroidism secondary to post operative decreased dose due to TSH getting too low. Symptoms include diarrhea x 3 days after eating Angeli [employer]ries. Patient denies change in energy level, heat / cold intolerance, nervousness, palpitations and weight changes. Onset of symptoms was several years ago. Symptoms have stabilized. Due for labs today for dose adjustment.           Current Outpatient  None   Social Needs    Financial resource strain: None    Food insecurity:     Worry: None     Inability: None    Transportation needs:     Medical: None     Non-medical: None   Tobacco Use    Smoking status: Former Smoker     Packs/day: 0.25     Years: 5.00     Pack years: 1.25     Types: Cigarettes     Start date: 3/20/2017     Last attempt to quit: 10/25/2017     Years since quittin.7    Smokeless tobacco: Never Used    Tobacco comment: 20 years 1 pack last 2 days - also starting to use E cig to cut back on usage. Substance and Sexual Activity    Alcohol use: No     Alcohol/week: 0.0 oz    Drug use: No    Sexual activity: Yes   Lifestyle    Physical activity:     Days per week: None     Minutes per session: None    Stress: None   Relationships    Social connections:     Talks on phone: None     Gets together: None     Attends Bahai service: None     Active member of club or organization: None     Attends meetings of clubs or organizations: None     Relationship status: None    Intimate partner violence:     Fear of current or ex partner: None     Emotionally abused: None     Physically abused: None     Forced sexual activity: None   Other Topics Concern    None   Social History Narrative    None       I have reviewed Chesters allergies, medications, problem list, medical, social and family history and have updated as needed in the electronic medical record    Review of Systems   Constitutional: Negative for activity change, appetite change, chills, diaphoresis, fatigue, fever and unexpected weight change. HENT: Positive for sneezing. Negative for congestion, dental problem, drooling, ear discharge, ear pain, facial swelling, hearing loss, mouth sores, nosebleeds, postnasal drip, rhinorrhea, sinus pressure, sinus pain, sore throat, tinnitus, trouble swallowing and voice change. Eyes: Negative for visual disturbance.    Respiratory: Negative for cough, chest tightness, shortness of 7/30/2019) for depression/, anxiety, elevated RBC indices,. Discussed smoking cessation, Discussed exercising 30 minutes daily and Discussed taking medications as directed and adverse effects        I have reviewed my findings and recommendations with Diamond Soares.     Maranda Vieira, NP-C, FNP-BC

## 2019-08-06 DIAGNOSIS — K21.00 GASTROESOPHAGEAL REFLUX DISEASE WITH ESOPHAGITIS: ICD-10-CM

## 2019-08-06 RX ORDER — PANTOPRAZOLE SODIUM 40 MG/1
40 TABLET, DELAYED RELEASE ORAL
Qty: 90 TABLET | Refills: 3 | Status: SHIPPED | OUTPATIENT
Start: 2019-08-06 | End: 2020-01-02

## 2019-09-05 DIAGNOSIS — E89.0 POSTOPERATIVE HYPOTHYROIDISM: Primary | ICD-10-CM

## 2019-09-05 DIAGNOSIS — E89.0 POSTOPERATIVE HYPOTHYROIDISM: ICD-10-CM

## 2019-09-05 RX ORDER — LEVOTHYROXINE SODIUM 88 UG/1
88 TABLET ORAL DAILY
Qty: 30 TABLET | Refills: 0 | Status: SHIPPED | OUTPATIENT
Start: 2019-09-05 | End: 2019-09-09 | Stop reason: SDUPTHER

## 2019-09-09 ENCOUNTER — HOSPITAL ENCOUNTER (OUTPATIENT)
Age: 64
Discharge: HOME | End: 2019-09-11
Payer: MEDICARE

## 2019-09-09 DIAGNOSIS — E89.0 POSTOPERATIVE HYPOTHYROIDISM: ICD-10-CM

## 2019-09-09 LAB
T4 FREE: 1.61 NG/DL (ref 0.93–1.7)
TSH SERPL DL<=0.05 MIU/L-ACNC: 0.21 UIU/ML (ref 0.27–4.2)

## 2019-09-09 PROCEDURE — 84443 ASSAY THYROID STIM HORMONE: CPT

## 2019-09-09 PROCEDURE — 36415 COLL VENOUS BLD VENIPUNCTURE: CPT

## 2019-09-09 PROCEDURE — 84439 ASSAY OF FREE THYROXINE: CPT

## 2019-09-09 RX ORDER — LEVOTHYROXINE SODIUM 0.07 MG/1
75 TABLET ORAL DAILY
Qty: 30 TABLET | Refills: 1 | Status: SHIPPED | OUTPATIENT
Start: 2019-09-09 | End: 2019-10-18 | Stop reason: SDUPTHER

## 2019-09-13 ENCOUNTER — OFFICE VISIT (OUTPATIENT)
Dept: FAMILY MEDICINE CLINIC | Age: 64
End: 2019-09-13
Payer: MEDICARE

## 2019-09-13 VITALS
RESPIRATION RATE: 18 BRPM | WEIGHT: 161.7 LBS | DIASTOLIC BLOOD PRESSURE: 64 MMHG | HEART RATE: 81 BPM | HEIGHT: 63 IN | SYSTOLIC BLOOD PRESSURE: 110 MMHG | OXYGEN SATURATION: 95 % | TEMPERATURE: 97.6 F | BODY MASS INDEX: 28.65 KG/M2

## 2019-09-13 DIAGNOSIS — B35.3 TINEA PEDIS OF BOTH FEET: ICD-10-CM

## 2019-09-13 DIAGNOSIS — R71.8 ELEVATED RED BLOOD CELL COUNT: ICD-10-CM

## 2019-09-13 DIAGNOSIS — E89.0 POSTOPERATIVE HYPOTHYROIDISM: Primary | ICD-10-CM

## 2019-09-13 PROCEDURE — 99214 OFFICE O/P EST MOD 30 MIN: CPT | Performed by: NURSE PRACTITIONER

## 2019-09-13 RX ORDER — FLUTICASONE PROPIONATE 50 MCG
1 SPRAY, SUSPENSION (ML) NASAL DAILY
Qty: 1 BOTTLE | Refills: 3 | Status: SHIPPED
Start: 2019-09-13 | End: 2020-04-15 | Stop reason: SDUPTHER

## 2019-09-13 ASSESSMENT — ENCOUNTER SYMPTOMS
ABDOMINAL PAIN: 0
WHEEZING: 0
NAUSEA: 0
FACIAL SWELLING: 0
CONSTIPATION: 0
VOICE CHANGE: 0
SINUS PAIN: 0
COUGH: 0
DIARRHEA: 0
RHINORRHEA: 0
TROUBLE SWALLOWING: 0
SHORTNESS OF BREATH: 0
VOMITING: 0
SORE THROAT: 0
SINUS PRESSURE: 0
COLOR CHANGE: 0

## 2019-09-13 NOTE — PATIENT INSTRUCTIONS
as of: April 1, 2019  Content Version: 12.1  © 8793-0359 [employer]. Care instructions adapted under license by [employer]. If you have questions about a medical condition or this instruction, always ask your healthcare professional. [address] any warranty or liability for your use of this information.

## 2019-09-13 NOTE — PROGRESS NOTES
OFFICE PROGRESS NOTE  [address] [place] [address] Rd  [address] [id number]  Dept: [phone]   Chief Complaint   Patient presents with   Vinayak Ghosh 53 done 9/9/19. Patient would like 90 day supply on all meds. HPI:     Here today to discuss labs done for hypothyroid due to s/p goiter removal      Hypothyroidism: Patient complains of hypothyroidism. Symptoms include change in energy level, heat / cold intolerance and nervousness. Patient denies diarrhea, palpitations and weight changes. Onset of symptoms was several years ago. Symptoms have stabilized. Levothyroxine decreased to 75 mcg daily and to get labs repeated in 4 - 6 weeks. Lab Results   Component Value Date    TSH 0.215 (L) 09/09/2019    [id number] 10.6 01/29/2018    T4FREE 1.61 09/09/2019     She has peeling skin on both feet at the ball of the foot noticed yesterday. Nothing anywhere else, does lotion feet twice a day and hasn't had this in the past. She has started using lotrimin this morning. Nothing between the toes.      She has cut down to 5 cigarettes her RBC indices have elevated and I think related to her smoking and family smoking, however her daughter has Manjit Perez will recheck lab in 2 months if not smoking and still elevated will refer to DR Frances Fragoso [facility]      Current Outpatient Medications:     fluticasone (FLONASE) 50 MCG/ACT nasal spray, 1 spray by Each Nostril route daily, Disp: 1 Bottle, Rfl: 3    levothyroxine (SYNTHROID) 75 MCG tablet, Take 1 tablet by mouth daily 1 hour before breakfast For thyroid, Disp: 30 tablet, Rfl: 1    pantoprazole (PROTONIX) 40 MG tablet, Take 1 tablet by mouth every morning (before breakfast), Disp: 90 tablet, Rfl: 3    ibuprofen (ADVIL;MOTRIN) 800 MG tablet, Take 1 tablet by mouth every 6 hours as needed for Pain, Disp: 120 tablet, Rfl: 0    hydrochlorothiazide (HYDRODIURIL) 25 MG tablet, TAKE 1 TABLET BY MOUTH ONCE DAILY FOR

## 2019-09-16 RX ORDER — FLUTICASONE PROPIONATE 50 MCG
SPRAY, SUSPENSION (ML) NASAL
Refills: 3 | OUTPATIENT
Start: 2019-09-16

## 2019-10-01 DIAGNOSIS — E89.0 POSTOPERATIVE HYPOTHYROIDISM: ICD-10-CM

## 2019-10-01 RX ORDER — LEVOTHYROXINE SODIUM 88 UG/1
TABLET ORAL
Qty: 30 TABLET | Refills: 0 | OUTPATIENT
Start: 2019-10-01

## 2019-10-23 ENCOUNTER — HOSPITAL ENCOUNTER (OUTPATIENT)
Age: 64
Discharge: HOME | End: 2019-10-25
Payer: MEDICARE

## 2019-10-23 DIAGNOSIS — E89.0 POSTOPERATIVE HYPOTHYROIDISM: ICD-10-CM

## 2019-10-23 DIAGNOSIS — R71.8 ELEVATED RED BLOOD CELL COUNT: ICD-10-CM

## 2019-10-23 LAB
BASOPHILS ABSOLUTE: 0.05 E9/L (ref 0–0.2)
BASOPHILS RELATIVE PERCENT: 0.6 % (ref 0–2)
EOSINOPHILS ABSOLUTE: 0.21 E9/L (ref 0.05–0.5)
EOSINOPHILS RELATIVE PERCENT: 2.4 % (ref 0–6)
HCT VFR BLD CALC: 54.2 % (ref 34–48)
HEMOGLOBIN: 17.1 G/DL (ref 11.5–15.5)
IMMATURE GRANULOCYTES #: 0.04 E9/L
IMMATURE GRANULOCYTES %: 0.5 % (ref 0–5)
LYMPHOCYTES ABSOLUTE: 2.15 E9/L (ref 1.5–4)
LYMPHOCYTES RELATIVE PERCENT: 24.3 % (ref 20–42)
MCH RBC QN AUTO: 29.2 PG (ref 26–35)
MCHC RBC AUTO-ENTMCNC: 31.5 % (ref 32–34.5)
MCV RBC AUTO: 92.6 FL (ref 80–99.9)
MONOCYTES ABSOLUTE: 0.57 E9/L (ref 0.1–0.95)
MONOCYTES RELATIVE PERCENT: 6.4 % (ref 2–12)
NEUTROPHILS ABSOLUTE: 5.84 E9/L (ref 1.8–7.3)
NEUTROPHILS RELATIVE PERCENT: 65.8 % (ref 43–80)
PDW BLD-RTO: 14.2 FL (ref 11.5–15)
PLATELET # BLD: 315 E9/L (ref 130–450)
PMV BLD AUTO: 9.8 FL (ref 7–12)
RBC # BLD: 5.85 E12/L (ref 3.5–5.5)
T4 FREE: 1.43 NG/DL (ref 0.93–1.7)
TSH SERPL DL<=0.05 MIU/L-ACNC: 1.02 UIU/ML (ref 0.27–4.2)
WBC # BLD: 8.9 E9/L (ref 4.5–11.5)

## 2019-10-23 PROCEDURE — 36415 COLL VENOUS BLD VENIPUNCTURE: CPT

## 2019-10-23 PROCEDURE — 84439 ASSAY OF FREE THYROXINE: CPT

## 2019-10-23 PROCEDURE — 84443 ASSAY THYROID STIM HORMONE: CPT

## 2019-10-23 PROCEDURE — 85025 COMPLETE CBC W/AUTO DIFF WBC: CPT

## 2019-11-19 ENCOUNTER — OFFICE VISIT (OUTPATIENT)
Dept: FAMILY MEDICINE CLINIC | Age: 64
End: 2019-11-19
Payer: MEDICARE

## 2019-11-19 VITALS
RESPIRATION RATE: 18 BRPM | WEIGHT: 171.5 LBS | HEART RATE: 70 BPM | DIASTOLIC BLOOD PRESSURE: 78 MMHG | TEMPERATURE: 97.1 F | HEIGHT: 63 IN | BODY MASS INDEX: 30.39 KG/M2 | OXYGEN SATURATION: 95 % | SYSTOLIC BLOOD PRESSURE: 130 MMHG

## 2019-11-19 DIAGNOSIS — B35.3 TINEA PEDIS OF BOTH FEET: ICD-10-CM

## 2019-11-19 DIAGNOSIS — Z23 NEED FOR PROPHYLACTIC VACCINATION AGAINST DIPHTHERIA-TETANUS-PERTUSSIS (DTP): ICD-10-CM

## 2019-11-19 DIAGNOSIS — Z12.31 SCREENING MAMMOGRAM, ENCOUNTER FOR: ICD-10-CM

## 2019-11-19 DIAGNOSIS — R71.8 ELEVATED RED BLOOD CELL COUNT: ICD-10-CM

## 2019-11-19 DIAGNOSIS — G47.00 INSOMNIA, UNSPECIFIED TYPE: ICD-10-CM

## 2019-11-19 DIAGNOSIS — E89.0 POSTOPERATIVE HYPOTHYROIDISM: ICD-10-CM

## 2019-11-19 DIAGNOSIS — E11.9 TYPE 2 DIABETES MELLITUS WITHOUT COMPLICATION, WITHOUT LONG-TERM CURRENT USE OF INSULIN (HCC): Primary | ICD-10-CM

## 2019-11-19 LAB
CHP ED QC CHECK: NORMAL
CREATININE URINE POCT: NORMAL
GLUCOSE BLD-MCNC: 116 MG/DL
HBA1C MFR BLD: 6.4 %
MICROALBUMIN/CREAT 24H UR: NORMAL MG/G{CREAT}
MICROALBUMIN/CREAT UR-RTO: NORMAL

## 2019-11-19 PROCEDURE — 83036 HEMOGLOBIN GLYCOSYLATED A1C: CPT | Performed by: NURSE PRACTITIONER

## 2019-11-19 PROCEDURE — 82962 GLUCOSE BLOOD TEST: CPT | Performed by: NURSE PRACTITIONER

## 2019-11-19 PROCEDURE — 82044 UR ALBUMIN SEMIQUANTITATIVE: CPT | Performed by: NURSE PRACTITIONER

## 2019-11-19 PROCEDURE — 99214 OFFICE O/P EST MOD 30 MIN: CPT | Performed by: NURSE PRACTITIONER

## 2019-11-19 RX ORDER — LEVOTHYROXINE SODIUM 0.07 MG/1
75 TABLET ORAL DAILY
Qty: 90 TABLET | Refills: 3 | Status: SHIPPED
Start: 2019-11-19 | End: 2020-04-08 | Stop reason: SDUPTHER

## 2019-11-19 RX ORDER — PEN NEEDLE, DIABETIC 32GX 5/32"
1 NEEDLE, DISPOSABLE MISCELLANEOUS DAILY
Qty: 100 EACH | Refills: 4 | Status: SHIPPED
Start: 2019-11-19 | End: 2020-04-15 | Stop reason: SDUPTHER

## 2019-11-19 ASSESSMENT — ENCOUNTER SYMPTOMS
TROUBLE SWALLOWING: 0
CHEST TIGHTNESS: 0
VOICE CHANGE: 0
SORE THROAT: 0
ABDOMINAL PAIN: 0
SINUS PAIN: 0
COLOR CHANGE: 0
NAUSEA: 0
FACIAL SWELLING: 0
SHORTNESS OF BREATH: 0
SINUS PRESSURE: 0
DIARRHEA: 0
RHINORRHEA: 0
WHEEZING: 0
VOMITING: 0
CONSTIPATION: 0
BACK PAIN: 0
COUGH: 1

## 2019-12-03 ENCOUNTER — OFFICE VISIT (OUTPATIENT)
Dept: ONCOLOGY | Age: 64
End: 2019-12-03
Payer: MEDICARE

## 2019-12-03 ENCOUNTER — HOSPITAL ENCOUNTER (OUTPATIENT)
Dept: INFUSION THERAPY | Age: 64
Discharge: HOME | End: 2019-12-03
Payer: MEDICARE

## 2019-12-03 VITALS
HEART RATE: 78 BPM | DIASTOLIC BLOOD PRESSURE: 75 MMHG | BODY MASS INDEX: 31.17 KG/M2 | WEIGHT: 169.4 LBS | SYSTOLIC BLOOD PRESSURE: 148 MMHG | TEMPERATURE: 96.8 F | HEIGHT: 62 IN | OXYGEN SATURATION: 92 %

## 2019-12-03 DIAGNOSIS — D75.1 ERYTHROCYTOSIS: ICD-10-CM

## 2019-12-03 DIAGNOSIS — R53.83 OTHER FATIGUE: ICD-10-CM

## 2019-12-03 DIAGNOSIS — R79.9 ABNORMAL FINDING OF BLOOD CHEMISTRY, UNSPECIFIED: ICD-10-CM

## 2019-12-03 DIAGNOSIS — D75.1 ERYTHROCYTOSIS: Primary | ICD-10-CM

## 2019-12-03 LAB
ALBUMIN SERPL-MCNC: 4.5 G/DL (ref 3.5–5.2)
ALP BLD-CCNC: 78 U/L (ref 35–104)
ALT SERPL-CCNC: 13 U/L (ref 0–32)
ANION GAP SERPL CALCULATED.3IONS-SCNC: 11 MMOL/L (ref 7–16)
AST SERPL-CCNC: 18 U/L (ref 0–31)
BASOPHILS ABSOLUTE: 0.04 E9/L (ref 0–0.2)
BASOPHILS RELATIVE PERCENT: 0.4 % (ref 0–2)
BILIRUB SERPL-MCNC: 0.6 MG/DL (ref 0–1.2)
BUN BLDV-MCNC: 14 MG/DL (ref 8–23)
CALCIUM SERPL-MCNC: 9.5 MG/DL (ref 8.6–10.2)
CHLORIDE BLD-SCNC: 97 MMOL/L (ref 98–107)
CO2: 29 MMOL/L (ref 22–29)
CREAT SERPL-MCNC: 0.8 MG/DL (ref 0.5–1)
EOSINOPHILS ABSOLUTE: 0.11 E9/L (ref 0.05–0.5)
EOSINOPHILS RELATIVE PERCENT: 1.2 % (ref 0–6)
FERRITIN: 40 NG/ML
GFR AFRICAN AMERICAN: >60
GFR NON-AFRICAN AMERICAN: >60 ML/MIN/1.73
GLUCOSE BLD-MCNC: 106 MG/DL (ref 74–99)
HCT VFR BLD CALC: 50 % (ref 34–48)
HEMOGLOBIN: 16.7 G/DL (ref 11.5–15.5)
IMMATURE GRANULOCYTES #: 0.03 E9/L
IMMATURE GRANULOCYTES %: 0.3 % (ref 0–5)
IRON SATURATION: 17 % (ref 15–50)
IRON: 65 MCG/DL (ref 37–145)
LYMPHOCYTES ABSOLUTE: 2.15 E9/L (ref 1.5–4)
LYMPHOCYTES RELATIVE PERCENT: 23.6 % (ref 20–42)
MCH RBC QN AUTO: 30.5 PG (ref 26–35)
MCHC RBC AUTO-ENTMCNC: 33.4 % (ref 32–34.5)
MCV RBC AUTO: 91.2 FL (ref 80–99.9)
MONOCYTES ABSOLUTE: 0.68 E9/L (ref 0.1–0.95)
MONOCYTES RELATIVE PERCENT: 7.5 % (ref 2–12)
NEUTROPHILS ABSOLUTE: 6.09 E9/L (ref 1.8–7.3)
NEUTROPHILS RELATIVE PERCENT: 67 % (ref 43–80)
PDW BLD-RTO: 14 FL (ref 11.5–15)
PLATELET # BLD: 307 E9/L (ref 130–450)
PMV BLD AUTO: 9.1 FL (ref 7–12)
POTASSIUM SERPL-SCNC: 3.7 MMOL/L (ref 3.5–5)
RBC # BLD: 5.48 E12/L (ref 3.5–5.5)
SODIUM BLD-SCNC: 137 MMOL/L (ref 132–146)
TOTAL IRON BINDING CAPACITY: 385 MCG/DL (ref 250–450)
TOTAL PROTEIN: 7.3 G/DL (ref 6.4–8.3)
TSH SERPL DL<=0.05 MIU/L-ACNC: 1.36 UIU/ML (ref 0.27–4.2)
WBC # BLD: 9.1 E9/L (ref 4.5–11.5)

## 2019-12-03 PROCEDURE — 83550 IRON BINDING TEST: CPT

## 2019-12-03 PROCEDURE — 36415 COLL VENOUS BLD VENIPUNCTURE: CPT

## 2019-12-03 PROCEDURE — 82728 ASSAY OF FERRITIN: CPT

## 2019-12-03 PROCEDURE — 85025 COMPLETE CBC W/AUTO DIFF WBC: CPT

## 2019-12-03 PROCEDURE — 82668 ASSAY OF ERYTHROPOIETIN: CPT

## 2019-12-03 PROCEDURE — 83540 ASSAY OF IRON: CPT

## 2019-12-03 PROCEDURE — 81270 JAK2 GENE: CPT

## 2019-12-03 PROCEDURE — 84443 ASSAY THYROID STIM HORMONE: CPT

## 2019-12-03 PROCEDURE — 80053 COMPREHEN METABOLIC PANEL: CPT

## 2019-12-03 PROCEDURE — 99214 OFFICE O/P EST MOD 30 MIN: CPT

## 2019-12-04 LAB — PATHOLOGIST REVIEW: NORMAL

## 2019-12-05 LAB — ERYTHROPOIETIN: 8 MU/ML (ref 4–27)

## 2019-12-08 LAB — JAK2 GENE MUTATION QUAL: NOT DETECTED

## 2019-12-27 ENCOUNTER — HOSPITAL ENCOUNTER (OUTPATIENT)
Dept: INFUSION THERAPY | Age: 64
Discharge: HOME | End: 2019-12-27
Payer: MEDICARE

## 2019-12-27 DIAGNOSIS — D75.1 ERYTHROCYTOSIS: ICD-10-CM

## 2019-12-31 DIAGNOSIS — E78.2 MIXED HYPERLIPIDEMIA: ICD-10-CM

## 2019-12-31 RX ORDER — ROSUVASTATIN CALCIUM 20 MG/1
20 TABLET, COATED ORAL DAILY
Qty: 90 TABLET | Refills: 2 | Status: SHIPPED
Start: 2019-12-31 | End: 2020-04-08 | Stop reason: SDUPTHER

## 2020-01-02 RX ORDER — HYDROCHLOROTHIAZIDE 25 MG/1
TABLET ORAL
Qty: 90 TABLET | Refills: 1 | Status: SHIPPED
Start: 2020-01-02 | End: 2020-04-06 | Stop reason: SDUPTHER

## 2020-01-07 ENCOUNTER — HOSPITAL ENCOUNTER (OUTPATIENT)
Dept: INFUSION THERAPY | Age: 65
Discharge: HOME | End: 2020-01-07
Payer: MEDICARE

## 2020-01-07 ENCOUNTER — OFFICE VISIT (OUTPATIENT)
Dept: ONCOLOGY | Age: 65
End: 2020-01-07
Payer: MEDICARE

## 2020-01-07 VITALS
BODY MASS INDEX: 31.6 KG/M2 | WEIGHT: 171.7 LBS | SYSTOLIC BLOOD PRESSURE: 126 MMHG | TEMPERATURE: 98 F | HEART RATE: 78 BPM | DIASTOLIC BLOOD PRESSURE: 72 MMHG | HEIGHT: 62 IN

## 2020-01-07 LAB
BASOPHILS ABSOLUTE: 0.07 E9/L (ref 0–0.2)
BASOPHILS RELATIVE PERCENT: 0.8 % (ref 0–2)
EOSINOPHILS ABSOLUTE: 0.16 E9/L (ref 0.05–0.5)
EOSINOPHILS RELATIVE PERCENT: 1.7 % (ref 0–6)
HCT VFR BLD CALC: 52.1 % (ref 34–48)
HEMOGLOBIN: 17.1 G/DL (ref 11.5–15.5)
IMMATURE GRANULOCYTES #: 0.03 E9/L
IMMATURE GRANULOCYTES %: 0.3 % (ref 0–5)
LYMPHOCYTES ABSOLUTE: 1.95 E9/L (ref 1.5–4)
LYMPHOCYTES RELATIVE PERCENT: 21.1 % (ref 20–42)
MCH RBC QN AUTO: 30.5 PG (ref 26–35)
MCHC RBC AUTO-ENTMCNC: 32.8 % (ref 32–34.5)
MCV RBC AUTO: 92.9 FL (ref 80–99.9)
MONOCYTES ABSOLUTE: 0.67 E9/L (ref 0.1–0.95)
MONOCYTES RELATIVE PERCENT: 7.3 % (ref 2–12)
NEUTROPHILS ABSOLUTE: 6.34 E9/L (ref 1.8–7.3)
NEUTROPHILS RELATIVE PERCENT: 68.8 % (ref 43–80)
PDW BLD-RTO: 14 FL (ref 11.5–15)
PLATELET # BLD: 303 E9/L (ref 130–450)
PMV BLD AUTO: 9.2 FL (ref 7–12)
RBC # BLD: 5.61 E12/L (ref 3.5–5.5)
WBC # BLD: 9.2 E9/L (ref 4.5–11.5)

## 2020-01-07 PROCEDURE — 85025 COMPLETE CBC W/AUTO DIFF WBC: CPT

## 2020-01-07 PROCEDURE — 99212 OFFICE O/P EST SF 10 MIN: CPT

## 2020-01-07 NOTE — PROGRESS NOTES
12/03/2019    NEUTROABS 6.09 12/03/2019    LYMPHSABS 2.15 12/03/2019    MONOSABS 0.68 12/03/2019    EOSABS 0.11 12/03/2019    BASOSABS 0.04 12/03/2019       Lab Results   Component Value Date     12/03/2019    K 3.7 12/03/2019    CL 97 (L) 12/03/2019    CO2 29 12/03/2019    BUN 14 12/03/2019    CREATININE 0.8 12/03/2019    GLUCOSE 106 (H) 12/03/2019    CALCIUM 9.5 12/03/2019    PROT 7.3 12/03/2019    LABALBU 4.5 12/03/2019    BILITOT 0.6 12/03/2019    ALKPHOS 78 12/03/2019    AST 18 12/03/2019    ALT 13 12/03/2019    LABGLOM >60 12/03/2019    GFRAA >60 12/03/2019       Lab Results   Component Value Date    IRON 65 12/03/2019    TIBC 385 12/03/2019    FERRITIN 40 12/03/2019           Radiology-    No results found. ASSESSMENT/PLAN :    Diagnoses and all orders for this visit:    Erythrocytosis    74 yo female  Erythrocytosis    - Likely her erythrocytosis is related to her past history of heavy smoking and current tobacco abuse  - Check thyroid studies as she is on levothyroxin s/p thyroidectomy  - EPO  - JAK2  - Smear  - CMP/CBC  - Iron profile  - She denies any signs/symptoms of KARSON.  Will hold on sleep study for now  - Smoking cessation counseling given  - RTC in 12/27 for results review    1/7/20  - TSH was normal, as was T4  - Iron profile WNL  - EPO normal at 8  - JAK2 negative  - Smear reviewed, felt to be reactive  - With above findings, erythrocytosis is related to smoking  - Continue to work on smoking cessation  - RTC in 2 months      Joan Mane MD   Electronically signed 1/7/2020 at 1:09 PM

## 2020-02-03 RX ORDER — IBUPROFEN 800 MG/1
800 TABLET ORAL EVERY 6 HOURS PRN
Qty: 120 TABLET | Refills: 0 | Status: SHIPPED
Start: 2020-02-03 | End: 2021-01-07 | Stop reason: SDUPTHER

## 2020-02-03 NOTE — TELEPHONE ENCOUNTER
[facility]   11/19/2019  Next Appointment  2/19/2020    Patient informed that all medications can take up to 72 business hours, can check with their pharmacy in the meantime if they like

## 2020-02-19 ENCOUNTER — OFFICE VISIT (OUTPATIENT)
Dept: FAMILY MEDICINE CLINIC | Age: 65
End: 2020-02-19
Payer: MEDICARE

## 2020-02-19 VITALS
DIASTOLIC BLOOD PRESSURE: 70 MMHG | WEIGHT: 175.4 LBS | RESPIRATION RATE: 18 BRPM | HEIGHT: 62 IN | SYSTOLIC BLOOD PRESSURE: 120 MMHG | BODY MASS INDEX: 32.28 KG/M2 | HEART RATE: 82 BPM | OXYGEN SATURATION: 97 %

## 2020-02-19 PROBLEM — B35.3 TINEA PEDIS OF BOTH FEET: Status: RESOLVED | Noted: 2019-09-13 | Resolved: 2020-02-19

## 2020-02-19 PROBLEM — M81.0 AGE-RELATED OSTEOPOROSIS WITHOUT CURRENT PATHOLOGICAL FRACTURE: Status: ACTIVE | Noted: 2020-02-19

## 2020-02-19 PROBLEM — Z12.11 ENCOUNTER FOR SCREENING COLONOSCOPY: Status: ACTIVE | Noted: 2020-02-19

## 2020-02-19 LAB
CHP ED QC CHECK: NORMAL
GLUCOSE BLD-MCNC: 109 MG/DL
HBA1C MFR BLD: 6.3 %

## 2020-02-19 PROCEDURE — 83036 HEMOGLOBIN GLYCOSYLATED A1C: CPT | Performed by: NURSE PRACTITIONER

## 2020-02-19 PROCEDURE — 2022F DILAT RTA XM EVC RTNOPTHY: CPT | Performed by: NURSE PRACTITIONER

## 2020-02-19 PROCEDURE — G8417 CALC BMI ABV UP PARAM F/U: HCPCS | Performed by: NURSE PRACTITIONER

## 2020-02-19 PROCEDURE — 82962 GLUCOSE BLOOD TEST: CPT | Performed by: NURSE PRACTITIONER

## 2020-02-19 PROCEDURE — 99213 OFFICE O/P EST LOW 20 MIN: CPT | Performed by: NURSE PRACTITIONER

## 2020-02-19 PROCEDURE — G8482 FLU IMMUNIZE ORDER/ADMIN: HCPCS | Performed by: NURSE PRACTITIONER

## 2020-02-19 PROCEDURE — 3044F HG A1C LEVEL LT 7.0%: CPT | Performed by: NURSE PRACTITIONER

## 2020-02-19 PROCEDURE — 1036F TOBACCO NON-USER: CPT | Performed by: NURSE PRACTITIONER

## 2020-02-19 PROCEDURE — G8400 PT W/DXA NO RESULTS DOC: HCPCS | Performed by: NURSE PRACTITIONER

## 2020-02-19 PROCEDURE — 4040F PNEUMOC VAC/ADMIN/RCVD: CPT | Performed by: NURSE PRACTITIONER

## 2020-02-19 PROCEDURE — G8427 DOCREV CUR MEDS BY ELIG CLIN: HCPCS | Performed by: NURSE PRACTITIONER

## 2020-02-19 PROCEDURE — 1123F ACP DISCUSS/DSCN MKR DOCD: CPT | Performed by: NURSE PRACTITIONER

## 2020-02-19 PROCEDURE — 3017F COLORECTAL CA SCREEN DOC REV: CPT | Performed by: NURSE PRACTITIONER

## 2020-02-19 PROCEDURE — 1090F PRES/ABSN URINE INCON ASSESS: CPT | Performed by: NURSE PRACTITIONER

## 2020-02-19 RX ORDER — VENLAFAXINE HYDROCHLORIDE 75 MG/1
CAPSULE, EXTENDED RELEASE ORAL
Qty: 90 CAPSULE | Refills: 3 | Status: SHIPPED
Start: 2020-02-19 | End: 2020-04-08 | Stop reason: SDUPTHER

## 2020-02-19 RX ORDER — PANTOPRAZOLE SODIUM 40 MG/1
TABLET, DELAYED RELEASE ORAL
Qty: 90 TABLET | Refills: 1 | Status: SHIPPED
Start: 2020-02-19 | End: 2020-04-06 | Stop reason: SDUPTHER

## 2020-02-19 ASSESSMENT — ENCOUNTER SYMPTOMS
RHINORRHEA: 0
ABDOMINAL PAIN: 0
BACK PAIN: 0
SORE THROAT: 0
NAUSEA: 0
DIARRHEA: 0
SHORTNESS OF BREATH: 0
COLOR CHANGE: 0
SINUS PAIN: 0
SINUS PRESSURE: 0
COUGH: 0
WHEEZING: 0
VOMITING: 0
FACIAL SWELLING: 0
CONSTIPATION: 0
VOICE CHANGE: 0
TROUBLE SWALLOWING: 0
CHEST TIGHTNESS: 0

## 2020-02-19 NOTE — PROGRESS NOTES
OFFICE PROGRESS NOTE  [address]  [address] [facility] [address]  Dept: [phone]   Chief Complaint   Patient presents with    Diabetes    Hyperlipidemia    Health Maintenance     Patient refuses shingles vaccine. HPI:     75 y.o. female presents today for follow-up of diabetes [place]. In-office bloodsugar is:   Results for POC orders placed in visit on 02/19/20   POCT glycosylated hemoglobin (Hb A1C)   Result Value Ref Range    Hemoglobin A1C 6.3 %   POCT Glucose   Result Value Ref Range    Glucose 109 mg/dL    QC OK? Patient reports being compliant to low carbohydrate diet and increasing weekly exercises. Currently, the patient is treated with medication(s):  Victoza 1.8 mg daily. Patient reports checking home blood sugar 3 times per week. Patient states home blood sugar ranges from 100 - 160. Patient denies hypoglycemic episodes and understand to take sweetened beverages or a snack if hypoglycemic symptoms are suspected. Hyperlipidemia: Patient presents with hyperlipidemia. She was tested because DM, hyperlipidemia. Her last labs showed Total cholesterol of 122, HDL 37, LDL 55,  Triglycerides 149. Denies chest pain, dyspnea, exertional chest pressure/discomfort, fatigue, feeding intolerance, lower extremity edema, palpitations, poor exercise tolerance, syncope, tachypnea and skin xanthelasma. There is a family history of hyperlipidemia. There is a family history of early ischemia heart disease.     Lab Results   Component Value Date    CHOL 122 01/07/2019    CHOL 135 07/10/2017    CHOL 276 (H) 01/23/2017     Lab Results   Component Value Date    TRIG 149 01/07/2019    TRIG 149 07/10/2017    TRIG 215 (H) 01/23/2017     Lab Results   Component Value Date    HDL 37 01/07/2019    HDL 39 07/10/2017    HDL 38 01/23/2017     Lab Results   Component Value Date    LDLCALC 55 01/07/2019    LDLCALC 66 07/10/2017    LDLCALC 195 (H) Rfl: 4    levothyroxine (SYNTHROID) 75 MCG tablet, Take 1 tablet by mouth daily 1 hour before breakfast For thyroid, Disp: 90 tablet, Rfl: 3    fluticasone (FLONASE) 50 MCG/ACT nasal spray, 1 spray by Each Nostril route daily, Disp: 1 Bottle, Rfl: 3    blood glucose monitor kit and supplies, Test 2 times a day. Please fill with insurance approved as we will not prior auth. DX:E11.9, Disp: 1 kit, Rfl: 0    Lancets MISC, 1 each by Does not apply route 2 times daily, Disp: 100 each, Rfl: 3    VENTOLIN  (90 Base) MCG/ACT inhaler, INHALE 2 PUFFS INTO THE LUNGS EVERY 6 HOURS AS NEEDED FOR WHEEZING. RINSE MOUTH AFTER USE, Disp: 18 g, Rfl: 0    Handicap Placard MISC, by Does not apply route Unable to ambulate more than 100 feet without difficulty Expires 2022, Disp: 1 each, Rfl: 0    aspirin 81 MG tablet, Take 81 mg by mouth daily, Disp: , Rfl:   Social History     Socioeconomic History    Marital status: Single     Spouse name: None    Number of children: None    Years of education: None    Highest education level: None   Occupational History    None   Social Needs    Financial resource strain: None    Food insecurity:     Worry: None     Inability: None    Transportation needs:     Medical: None     Non-medical: None   Tobacco Use    Smoking status: Former Smoker     Packs/day: 0.25     Years: 5.00     Pack years: 1.25     Types: Cigarettes     Start date: 3/20/2017     Last attempt to quit: 10/25/2017     Years since quittin.3    Smokeless tobacco: Never Used    Tobacco comment: 20 years 1 pack last 2 days - also starting to use E cig to cut back on usage.     Substance and Sexual Activity    Alcohol use: No     Alcohol/week: 0.0 standard drinks    Drug use: No    Sexual activity: Yes   Lifestyle    Physical activity:     Days per week: None     Minutes per session: None    Stress: None   Relationships    Social connections:     Talks on phone: None     Gets together: None bruise/bleed easily. Psychiatric/Behavioral: Negative for agitation, behavioral problems, confusion, decreased concentration, dysphoric mood, hallucinations, self-injury, sleep disturbance and suicidal ideas. The patient is nervous/anxious. The patient is not hyperactive. OBJECTIVE:     VS:  Wt Readings from Last 3 Encounters:   02/19/20 175 lb 6.4 oz (79.6 kg)   01/07/20 171 lb 11.2 oz (77.9 kg)   12/03/19 169 lb 6.4 oz (76.8 kg)                       Vitals:    02/19/20 1247   BP: 120/70   Pulse: 82   Resp: 18   SpO2: 97%   Weight: 175 lb 6.4 oz (79.6 kg)   Height: 5' 2\" (1.575 m)       General: Alert and oriented to person, place, and time, well developed and well nourished, overweight,  in no acute distress  SKIN: Warm and dry, intact without any rash, masses or lesions  HEAD: normocephalic, atraumatic  Eyes: sclera/conjunctiva clear, PERRLA, EOMI's intact  ENT: tympanic membranes, external ear and ear canal normal bilaterally, normal hearing, Nose without deformity, nasal mucosa and turbinates normal without polyps   Throat: clear, tongue midline, tonsils 1+, no  drainage, no masses or lesions noted, good dentition  Neck: supple and non-tender without mass, trachea midline, no cervical lymphadenopathy, no bruit, no thyromegaly or nodules  Cardiovascular: regular rate and regular rhythm, normal S1 and S2,  no murmurs, rubs, clicks, or gallop. Distal pulses intact, no carotid bruits. No edema  Pulmonary/Chest: clear to auscultation bilaterally, no wheezes, rales or rhonchi, normal air movement, no respiratory distress  Abdomen: soft, non-tender, non-distended, normal bowel sounds, no masses or hepatosplenomegaly  Musculoskeletal: Normal ROM, no joint swelling, deformity or tenderness   Neurologic: reflexes normal and symmetric, no cranial nerve deficit, gait, coordination and speech normal  Extremities: no clubbing, cyanosis, or edema.    Psychiatric: Good eye contact, normal mood and affect, answers

## 2020-02-19 NOTE — PATIENT INSTRUCTIONS
Patient Education        Gastroesophageal Reflux Disease (GERD): Care Instructions  Your Care Instructions    Gastroesophageal reflux disease (GERD) is the backward flow of stomach acid into the esophagus. The esophagus is the tube that leads from your throat to your stomach. A one-way valve prevents the stomach acid from moving up into this tube. When you have GERD, this valve does not close tightly enough. If you have mild GERD symptoms including heartburn, you may be able to control the problem with antacids or over-the-counter medicine. Changing your diet, losing weight, and making other lifestyle changes can also help reduce symptoms. Follow-up care is a key part of your treatment and safety. Be sure to make and go to all appointments, and call your doctor if you are having problems. It's also a good idea to know your test results and keep a list of the medicines you take. How can you care for yourself at home? · Take your medicines exactly as prescribed. Call your doctor if you think you are having a problem with your medicine. · Your doctor may recommend over-the-counter medicine. For mild or occasional indigestion, antacids, such as Tums, Gaviscon, Mylanta, or Maalox, may help. Your doctor also may recommend over-the-counter acid reducers, such as Pepcid AC, Tagamet HB, Zantac 75, or Prilosec. Read and follow all instructions on the label. If you use these medicines often, talk with your doctor. · Change your eating habits. ? It's best to eat several small meals instead of two or three large meals. ? After you eat, wait 2 to 3 hours before you lie down. ? Chocolate, mint, and alcohol can make GERD worse. ? Spicy foods, foods that have a lot of acid (like tomatoes and oranges), and coffee can make GERD symptoms worse in some people. If your symptoms are worse after you eat a certain food, you may want to stop eating that food to see if your symptoms get better.   · Do not smoke or chew tobacco. Smoking can make GERD worse. If you need help quitting, talk to your doctor about stop-smoking programs and medicines. These can increase your chances of quitting for good. · If you have GERD symptoms at night, raise the head of your bed 6 to 8 inches by putting the frame on blocks or placing a foam wedge under the head of your mattress. (Adding extra pillows does not work.)  · Do not wear tight clothing around your middle. · Lose weight if you need to. Losing just 5 to 10 pounds can help. When should you call for help? Call your doctor now or seek immediate medical care if:    · You have new or different belly pain.     · Your stools are black and tarlike or have streaks of blood.    Watch closely for changes in your health, and be sure to contact your doctor if:    · Your symptoms have not improved after 2 days.     · Food seems to catch in your throat or chest.   Where can you learn more? Go to [email or web address].[email or web address]. org and sign in to your Snap Trends account. Enter R327 in the [employer] box to learn more about \"Gastroesophageal Reflux Disease (GERD): Care Instructions. \"     If you do not have an account, please click on the \"Sign Up Now\" link. Current as of: August 11, 2019  Content Version: 12.3  © 8296-5553 Healthwise, Incorporated. Care instructions adapted under license by [facility] ([facility]). If you have questions about a medical condition or this instruction, always ask your healthcare professional. [place] [address] any warranty or liability for your use of this information. Patient Education        Preventing Osteoporosis: Care Instructions  Your Care Instructions    Osteoporosis means the bones are weak and thin enough that they can break easily. The older you are, the more likely you are to get osteoporosis. But with plenty of calcium, vitamin D, and exercise, you can help prevent osteoporosis.   The preteen and teen years are a key time for bone building. With the help of calcium, vitamin D, and exercise in those early years and beyond, the bones reach their peak density and strength by age 27. After age 27, your bones naturally start to thin and weaken. The stronger your bones are at around age 27, the lower your risk for osteoporosis. But no matter what your age and risk are, your bones still need calcium, vitamin D, and exercise to stay strong. Also avoid smoking, and limit alcohol. Smoking and heavy alcohol use can make your bones thinner. Talk to your doctor about any special risks you might have, such as having a close relative with osteoporosis or taking a medicine that can weaken bones. Your doctor can tell you the best ways to protect your bones from thinning. Follow-up care is a key part of your treatment and safety. Be sure to make and go to all appointments, and call your doctor if you are having problems. It's also a good idea to know your test results and keep a list of the medicines you take. How can you care for yourself at home? · Get enough calcium and vitamin D. The [place] [employer] recommends adults younger than age 46 need 1,000 mg of calcium and 600 IU of vitamin D each day. Women ages 46 to 79 need 1,200 mg of calcium and 600 IU of vitamin D each day. Men ages 46 to 79 need 1,000 mg of calcium and 600 IU of vitamin D each day. Adults 71 and older need 1,200 mg of calcium and 800 IU of vitamin D each day. ? Eat foods rich in calcium, like yogurt, cheese, milk, and dark green vegetables. ? Eat foods rich in vitamin D, like eggs, fatty fish, cereal, and fortified milk. ? Get some sunshine. Your body uses sunshine to make its own vitamin D. The safest time to be out in the sun is before 10 a.m. or after 3 p.m. Avoid getting sunburned. Sunburn can increase your risk of skin cancer. ? Talk to your doctor about taking a calcium plus vitamin D supplement.  Ask about what type of calcium is right for you, and how much to take at a

## 2020-02-21 RX ORDER — LANCETS 30 GAUGE
1 EACH MISCELLANEOUS 2 TIMES DAILY
Qty: 100 EACH | Refills: 3 | Status: SHIPPED
Start: 2020-02-21 | End: 2021-01-07 | Stop reason: SDUPTHER

## 2020-02-21 RX ORDER — GLUCOSAMINE HCL/CHONDROITIN SU 500-400 MG
CAPSULE ORAL
Qty: 100 STRIP | Refills: 3 | Status: SHIPPED
Start: 2020-02-21 | End: 2021-01-07 | Stop reason: SDUPTHER

## 2020-03-04 ENCOUNTER — HOSPITAL ENCOUNTER (OUTPATIENT)
Age: 65
Discharge: HOME | End: 2020-03-06
Payer: MEDICARE

## 2020-03-04 LAB
CHOLESTEROL, TOTAL: 161 MG/DL (ref 0–199)
HDLC SERPL-MCNC: 54 MG/DL
LDL CHOLESTEROL CALCULATED: 72 MG/DL (ref 0–99)
TRIGL SERPL-MCNC: 175 MG/DL (ref 0–149)
VLDLC SERPL CALC-MCNC: 35 MG/DL

## 2020-03-04 PROCEDURE — 80061 LIPID PANEL: CPT

## 2020-03-04 PROCEDURE — 36415 COLL VENOUS BLD VENIPUNCTURE: CPT

## 2020-03-05 ENCOUNTER — OFFICE VISIT (OUTPATIENT)
Dept: FAMILY MEDICINE CLINIC | Age: 65
End: 2020-03-05
Payer: MEDICARE

## 2020-03-05 VITALS
SYSTOLIC BLOOD PRESSURE: 112 MMHG | RESPIRATION RATE: 18 BRPM | WEIGHT: 175.9 LBS | DIASTOLIC BLOOD PRESSURE: 70 MMHG | HEIGHT: 63 IN | BODY MASS INDEX: 31.17 KG/M2 | HEART RATE: 84 BPM | OXYGEN SATURATION: 97 % | TEMPERATURE: 97.5 F

## 2020-03-05 PROCEDURE — G8482 FLU IMMUNIZE ORDER/ADMIN: HCPCS | Performed by: NURSE PRACTITIONER

## 2020-03-05 PROCEDURE — 3017F COLORECTAL CA SCREEN DOC REV: CPT | Performed by: NURSE PRACTITIONER

## 2020-03-05 PROCEDURE — 1123F ACP DISCUSS/DSCN MKR DOCD: CPT | Performed by: NURSE PRACTITIONER

## 2020-03-05 PROCEDURE — G0439 PPPS, SUBSEQ VISIT: HCPCS | Performed by: NURSE PRACTITIONER

## 2020-03-05 PROCEDURE — 4040F PNEUMOC VAC/ADMIN/RCVD: CPT | Performed by: NURSE PRACTITIONER

## 2020-03-05 ASSESSMENT — PATIENT HEALTH QUESTIONNAIRE - PHQ9
SUM OF ALL RESPONSES TO PHQ QUESTIONS 1-9: 0
SUM OF ALL RESPONSES TO PHQ QUESTIONS 1-9: 0

## 2020-03-05 ASSESSMENT — LIFESTYLE VARIABLES: HOW OFTEN DO YOU HAVE A DRINK CONTAINING ALCOHOL: 0

## 2020-03-05 NOTE — PATIENT INSTRUCTIONS
Personalized Preventive Plan for Lisa Rodas - 3/5/2020  Medicare offers a range of preventive health benefits. Some of the tests and screenings are paid in full while other may be subject to a deductible, co-insurance, and/or copay. Some of these benefits include a comprehensive review of your medical history including lifestyle, illnesses that may run in your family, and various assessments and screenings as appropriate. After reviewing your medical record and screening and assessments performed today your provider may have ordered immunizations, labs, imaging, and/or referrals for you. A list of these orders (if applicable) as well as your Preventive Care list are included within your After Visit Summary for your review. Other Preventive Recommendations:    · A preventive eye exam performed by an eye specialist is recommended every 1-2 years to screen for glaucoma; cataracts, macular degeneration, and other eye disorders. · A preventive dental visit is recommended every 6 months. · Try to get at least 150 minutes of exercise per week or 10,000 steps per day on a pedometer . · Order or download the FREE \"Exercise & Physical Activity: Your Everyday Guide\" from [employer]. Call 7-648.689.2914 or search [employer] online. · You need 8891-4064 mg of calcium and 3523-1952 IU of vitamin D per day. It is possible to meet your calcium requirement with diet alone, but a vitamin D supplement is usually necessary to meet this goal.  · When exposed to the sun, use a sunscreen that protects against both UVA and UVB radiation with an SPF of 30 or greater. Reapply every 2 to 3 hours or after sweating, drying off with a towel, or swimming. · Always wear a seat belt when traveling in a car. Always wear a helmet when riding a bicycle or motorcycle.

## 2020-03-05 NOTE — PROGRESS NOTES
Physician (Cardiology)    Wt Readings from Last 3 Encounters:   03/05/20 175 lb 14.4 oz (79.8 kg)   02/19/20 175 lb 6.4 oz (79.6 kg)   01/07/20 171 lb 11.2 oz (77.9 kg)     Vitals:    03/05/20 0705   BP: 112/70   Site: Left Upper Arm   Position: Sitting   Cuff Size: Medium Adult   Pulse: 84   Resp: 18   Temp: 97.5 °F (36.4 °C)   TempSrc: Temporal   SpO2: 97%   Weight: 175 lb 14.4 oz (79.8 kg)   Height: 5' 2.5\" (1.588 m)     Body mass index is 31.66 kg/m². Based upon direct observation of the patient, evaluation of cognition reveals recent and remote memory intact. General Appearance: alert and oriented to person, place and time, well developed and well- nourished,overweight in no acute distress  Skin: warm and dry, no rash or erythema  Head: normocephalic and atraumatic  Eyes: pupils equal, round, and reactive to light, extraocular eye movements intact, conjunctivae normal  ENT: tympanic membrane, external ear and ear canal normal bilaterally, nose without deformity, nasal mucosa and turbinates normal without polyps  Neck: supple and non-tender without mass, no thyromegaly or thyroid nodules, no cervical lymphadenopathy  Pulmonary/Chest: clear to auscultation bilaterally- no wheezes, rales or rhonchi, normal air movement, no respiratory distress  Cardiovascular: normal rate, regular ZTDGBN,6/4 systolic throughout the precordium, radiates to carotids  no rubs, clicks, or gallops, distal pulses intact, no edema.    Abdomen: soft, non-tender, non-distended, normal bowel sounds, no masses or organomegaly  Breast: appear normal, no suspicious masses, no skin or nipple changes or axillary nodes  Extremities: no cyanosis, clubbing or edema  Musculoskeletal: normal range of motion, no joint swelling, deformity or tenderness  Neurologic: reflexes normal and symmetric, no cranial nerve deficit, gait, coordination and speech normal    Patient's complete Health Risk Assessment and screening values have been reviewed and are found in [facility]. The following problems were reviewed today and where indicated follow up appointments were made and/or referrals ordered. Positive Risk Factor Screenings with Interventions:     Health Habits/Nutrition:  Health Habits/Nutrition  Do you exercise for at least 20 minutes 2-3 times per week?: Yes  Have you lost any weight without trying in the past 3 months?: No  Do you eat fewer than 2 meals per day?: No  Have you seen a dentist within the past year?: Yes  Body mass index is 31.66 kg/m².   Health Habits/Nutrition Interventions:  · No needs identified at this time    Personalized Preventive Plan   Current Health Maintenance Status  Immunization History   Administered Date(s) Administered    Influenza 09/27/2016    Influenza Vaccine, unspecified formulation 09/27/2016    Influenza, High Dose (Fluzone 65 yrs and older) 10/11/2017, 09/20/2018, 11/04/2019    Pneumococcal Conjugate 13-valent ([id number]) 02/22/2019    Pneumococcal Polysaccharide ([id number]) 01/31/2018    Tdap (Boostrix, Adacel) 11/30/2019        Health Maintenance   Topic Date Due    Hepatitis B vaccine (1 of 3 - Risk 3-dose series) [birth year]18/1972    DEXA (modify frequency per FRAX score)  11/18/2018    Breast cancer screen  08/15/2019    Annual Wellness Visit (AWV)  09/09/2019    Lipid screen  01/07/2020    Colon Cancer Screen FIT/FOBT  04/05/2020    Shingles Vaccine (1 of 2) 02/19/2021 (Originally 11/18/2003)    Diabetic microalbuminuria test  11/19/2020    TSH testing  12/03/2020    Potassium monitoring  12/03/2020    Creatinine monitoring  12/03/2020    Diabetic foot exam  02/19/2021    A1C test (Diabetic or Prediabetic)  02/19/2021    Diabetic retinal exam  04/01/2021    Pneumococcal 65+ years Vaccine (2 of 2 - PPSV23) 01/31/2023    DTaP/Tdap/Td vaccine (2 - Td) 11/30/2029    Flu vaccine  Completed    Hepatitis C screen  Completed    Hepatitis A vaccine  Aged Out    Hib vaccine  Aged C/ Julian Ruba 19

## 2020-03-06 ENCOUNTER — OFFICE VISIT (OUTPATIENT)
Dept: ONCOLOGY | Age: 65
End: 2020-03-06
Payer: MEDICARE

## 2020-03-06 ENCOUNTER — HOSPITAL ENCOUNTER (OUTPATIENT)
Dept: INFUSION THERAPY | Age: 65
Discharge: HOME | End: 2020-03-06
Payer: MEDICARE

## 2020-03-06 VITALS
HEIGHT: 62 IN | WEIGHT: 176.5 LBS | SYSTOLIC BLOOD PRESSURE: 117 MMHG | HEART RATE: 85 BPM | BODY MASS INDEX: 32.48 KG/M2 | OXYGEN SATURATION: 95 % | DIASTOLIC BLOOD PRESSURE: 70 MMHG | TEMPERATURE: 96.8 F

## 2020-03-06 DIAGNOSIS — D75.1 ERYTHROCYTOSIS: ICD-10-CM

## 2020-03-06 LAB
BASOPHILS ABSOLUTE: 0.07 E9/L (ref 0–0.2)
BASOPHILS RELATIVE PERCENT: 0.6 % (ref 0–2)
EOSINOPHILS ABSOLUTE: 0.22 E9/L (ref 0.05–0.5)
EOSINOPHILS RELATIVE PERCENT: 2 % (ref 0–6)
HCT VFR BLD CALC: 49.1 % (ref 34–48)
HEMOGLOBIN: 16.7 G/DL (ref 11.5–15.5)
IMMATURE GRANULOCYTES #: 0.05 E9/L
IMMATURE GRANULOCYTES %: 0.5 % (ref 0–5)
LYMPHOCYTES ABSOLUTE: 2.86 E9/L (ref 1.5–4)
LYMPHOCYTES RELATIVE PERCENT: 26.3 % (ref 20–42)
MCH RBC QN AUTO: 30.4 PG (ref 26–35)
MCHC RBC AUTO-ENTMCNC: 34 % (ref 32–34.5)
MCV RBC AUTO: 89.3 FL (ref 80–99.9)
MONOCYTES ABSOLUTE: 0.9 E9/L (ref 0.1–0.95)
MONOCYTES RELATIVE PERCENT: 8.3 % (ref 2–12)
NEUTROPHILS ABSOLUTE: 6.78 E9/L (ref 1.8–7.3)
NEUTROPHILS RELATIVE PERCENT: 62.3 % (ref 43–80)
PDW BLD-RTO: 13.4 FL (ref 11.5–15)
PLATELET # BLD: 306 E9/L (ref 130–450)
PMV BLD AUTO: 8.7 FL (ref 7–12)
RBC # BLD: 5.5 E12/L (ref 3.5–5.5)
WBC # BLD: 10.9 E9/L (ref 4.5–11.5)

## 2020-03-06 PROCEDURE — 85025 COMPLETE CBC W/AUTO DIFF WBC: CPT

## 2020-03-06 PROCEDURE — 36415 COLL VENOUS BLD VENIPUNCTURE: CPT

## 2020-03-06 PROCEDURE — 99212 OFFICE O/P EST SF 10 MIN: CPT

## 2020-03-06 RX ORDER — NICOTINE 21 MG/24HR
1 PATCH, TRANSDERMAL 24 HOURS TRANSDERMAL DAILY
Qty: 42 PATCH | Refills: 0 | Status: SHIPPED | OUTPATIENT
Start: 2020-03-06 | End: 2020-06-08 | Stop reason: ALTCHOICE

## 2020-03-06 NOTE — PROGRESS NOTES
with esophagitis 2018    Postoperative hypothyroidism 10/12/2017    Chronic midline low back pain with left-sided sciatica 10/06/2017    Leg length difference, acquired 2017    Mixed hyperlipidemia 2017    Type 2 diabetes mellitus without complication, without long-term current use of insulin ([facility] [address] 2017    Vitamin D deficiency 2017    Abnormal EKG 2017    Nonrheumatic aortic valve stenosis 2017        Past Surgical History:   Procedure Laterality Date    CHOLECYSTECTOMY      FOOT SURGERY      HEEL SPURS BOTH FEET    GALLBLADDER SURGERY  2006    HYSTERECTOMY  1980    TOTAL THYROIDECTOMY  2017    goiter with compressive symptoms       Family History  Family History   Problem Relation Age of Onset    Heart Disease Mother     Diabetes Mother          diabetic neuropathy    High Cholesterol Mother     Diabetes Father          from diabetic neuropathy stated patient    High Cholesterol Father        Social History    TOBACCO:   reports that she quit smoking about 2 years ago. Her smoking use included cigarettes. She started smoking about 2 years ago. She has a 1.25 pack-year smoking history. She has never used smokeless tobacco.  ETOH:   reports no history of alcohol use. Home Medications  Prior to Admission medications    Medication Sig Start Date End Date Taking? Authorizing Provider   Blood Glucose Monitoring Suppl (TRUE METRIX METER) w/Device KIT Test two times a day.  20   Iris Cushing, APRN - CNP   blood glucose monitor strips Test 2 times a day & as needed for symptoms of irregular blood glucose. 20   Iris Cushing, APRN - CNP   Lancets MISC 1 each by Does not apply route 2 times daily 20   Iris Cushing, APRN - CNP   Liraglutide (VICTOZA) 18 MG/3ML SOPN SC injection Inject 1.8 mg into the skin daily 20   Iris Cushing, APRN - CNP   venlafaxine (EFFEXOR XR) 75 MG extended release capsule TAKE 1 CAPSULE BY MOUTH ONCE DAILY 2/19/20   [facility], APRN - CNP   pantoprazole (PROTONIX) 40 MG tablet TAKE 1 TABLET BY MOUTH EVERY MORNING BEFORE BREAKFAST  Patient taking differently: Take 40 mg by mouth every other day TAKE 1 TABLET BY MOUTH EVERY MORNING BEFORE BREAKFAST 2/19/20   [facility], APRN - CNP   ibuprofen (ADVIL;MOTRIN) 800 MG tablet Take 1 tablet by mouth every 6 hours as needed for Pain 2/3/20   [facility], APRN - PIEDAD   hydrochlorothiazide (HYDRODIURIL) 25 MG tablet TAKE 1 TABLET BY MOUTH ONCE DAILY FOR BLOOD PRESSURE 1/2/20   [facility], APRN - CNP   rosuvastatin (CRESTOR) 20 MG tablet Take 1 tablet by mouth daily For cholesterol 12/31/19   [facility], COLE - CNP   BD PEN NEEDLE SHANEL U/F 32G X 4 MM MISC Inject 1 each into the skin daily 11/19/19   [facility], APRN - CNP   levothyroxine (SYNTHROID) 75 MCG tablet Take 1 tablet by mouth daily 1 hour before breakfast For thyroid 11/19/19   [facility], APRN - CNP   fluticasone (FLONASE) 50 MCG/ACT nasal spray 1 spray by Each Nostril route daily 9/13/19   [facility], APRN - CNP   blood glucose monitor kit and supplies Test 2 times a day. Please fill with insurance approved as we will not prior auth. DX:[id number] 11/26/18   [facility], COLE Duron CNP   Handicap Placard MISC by Does not apply route Unable to ambulate more than 100 feet without difficulty  Expires 1/16/2022 1/16/17   [facility], APRN - CNP   aspirin 81 MG tablet Take 81 mg by mouth daily    Historical Provider, MD       Allergies  Allergies   Allergen Reactions    Bactrim [Sulfamethoxazole-Trimethoprim] Shortness Of Breath     N/v/aching    Atenolol      Anxiousness muscles hurt    Lipitor [Atorvastatin] Other (See Comments)     cough    Metformin And Related Diarrhea     Severe 11 - 12 times a day       Review of Systems:    Constitutional:  No fever chills or rigors. Eyes: No changes in vision, discharge, or pain  ENT: No Headaches, hearing loss or vertigo.  No mouth sores or sore deficits . Recent Laboratory Data-   Lab Results   Component Value Date    WBC 9.2 01/07/2020    HGB 17.1 (H) 01/07/2020    HCT 52.1 (H) 01/07/2020    MCV 92.9 01/07/2020     01/07/2020    LYMPHOPCT 21.1 01/07/2020    RBC 5.61 (H) 01/07/2020    MCH 30.5 01/07/2020    MCHC 32.8 01/07/2020    RDW 14.0 01/07/2020    NEUTOPHILPCT 68.8 01/07/2020    MONOPCT 7.3 01/07/2020    BASOPCT 0.8 01/07/2020    NEUTROABS 6.34 01/07/2020    LYMPHSABS 1.95 01/07/2020    MONOSABS 0.67 01/07/2020    EOSABS 0.16 01/07/2020    BASOSABS 0.07 01/07/2020       Lab Results   Component Value Date     12/03/2019    K 3.7 12/03/2019    CL 97 (L) 12/03/2019    CO2 29 12/03/2019    BUN 14 12/03/2019    CREATININE 0.8 12/03/2019    GLUCOSE 109 02/19/2020    CALCIUM 9.5 12/03/2019    PROT 7.3 12/03/2019    LABALBU 4.5 12/03/2019    BILITOT 0.6 12/03/2019    ALKPHOS 78 12/03/2019    AST 18 12/03/2019    ALT 13 12/03/2019    LABGLOM >60 12/03/2019    GFRAA >60 12/03/2019       Lab Results   Component Value Date    IRON 65 12/03/2019    TIBC 385 12/03/2019    FERRITIN 40 12/03/2019           Radiology-    No results found. ASSESSMENT/PLAN :    Brianda Pepe was seen today for follow-up. Diagnoses and all orders for this visit:    Erythrocytosis    74 yo female  Erythrocytosis    - Likely her erythrocytosis is related to her past history of heavy smoking and current tobacco abuse  - Check thyroid studies as she is on levothyroxin s/p thyroidectomy  - EPO  - JAK2  - Smear  - CMP/CBC  - Iron profile  - She denies any signs/symptoms of KARSON.  Will hold on sleep study for now  - Smoking cessation counseling given  - RTC in 12/27 for results review    1/7/20  - TSH was normal, as was T4  - Iron profile WNL  - EPO normal at 8  - JAK2 negative  - Smear reviewed, felt to be reactive  - With above findings, erythrocytosis is related to smoking  - Continue to work on smoking cessation  - RTC in 2 months    3/6/20  - CBC stable, Hgb 16.7  - Continue to smoke  - NRT written for today  - Patient will follow up in 3 months       Tiana Gonzalez MD   Electronically signed 3/6/2020 at 1:13 PM

## 2020-03-10 ENCOUNTER — HOSPITAL ENCOUNTER (OUTPATIENT)
Dept: MAMMOGRAPHY | Age: 65
Discharge: HOME | End: 2020-03-12
Payer: MEDICARE

## 2020-03-10 PROCEDURE — 77080 DXA BONE DENSITY AXIAL: CPT

## 2020-03-16 RX ORDER — IBUPROFEN 800 MG/1
800 TABLET ORAL EVERY 6 HOURS PRN
Qty: 120 TABLET | Refills: 0 | OUTPATIENT
Start: 2020-03-16

## 2020-03-20 PROBLEM — Z12.11 ENCOUNTER FOR SCREENING COLONOSCOPY: Status: RESOLVED | Noted: 2020-02-19 | Resolved: 2020-03-20

## 2020-04-06 RX ORDER — PANTOPRAZOLE SODIUM 40 MG/1
40 TABLET, DELAYED RELEASE ORAL EVERY OTHER DAY
Qty: 45 TABLET | Refills: 2 | Status: SHIPPED
Start: 2020-04-06 | End: 2020-09-09 | Stop reason: SDUPTHER

## 2020-04-06 RX ORDER — LIRAGLUTIDE 6 MG/ML
1.8 INJECTION SUBCUTANEOUS DAILY
Qty: 27 PEN | Refills: 3 | Status: SHIPPED
Start: 2020-04-06 | End: 2021-01-07 | Stop reason: SDUPTHER

## 2020-04-06 RX ORDER — HYDROCHLOROTHIAZIDE 25 MG/1
TABLET ORAL
Qty: 90 TABLET | Refills: 1 | Status: SHIPPED
Start: 2020-04-06 | End: 2020-09-09

## 2020-04-08 RX ORDER — LEVOTHYROXINE SODIUM 0.07 MG/1
75 TABLET ORAL DAILY
Qty: 90 TABLET | Refills: 3 | Status: SHIPPED
Start: 2020-04-08 | End: 2021-01-07 | Stop reason: SDUPTHER

## 2020-04-08 RX ORDER — VENLAFAXINE HYDROCHLORIDE 75 MG/1
CAPSULE, EXTENDED RELEASE ORAL
Qty: 90 CAPSULE | Refills: 3 | Status: SHIPPED
Start: 2020-04-08 | End: 2020-12-09 | Stop reason: SDUPTHER

## 2020-04-08 RX ORDER — ROSUVASTATIN CALCIUM 20 MG/1
20 TABLET, COATED ORAL DAILY
Qty: 90 TABLET | Refills: 2 | Status: SHIPPED
Start: 2020-04-08 | End: 2021-01-07 | Stop reason: SDUPTHER

## 2020-04-15 RX ORDER — PEN NEEDLE, DIABETIC 32GX 5/32"
1 NEEDLE, DISPOSABLE MISCELLANEOUS DAILY
Qty: 100 EACH | Refills: 4 | Status: SHIPPED
Start: 2020-04-15 | End: 2021-01-07 | Stop reason: SDUPTHER

## 2020-04-15 RX ORDER — FLUTICASONE PROPIONATE 50 MCG
1 SPRAY, SUSPENSION (ML) NASAL DAILY
Qty: 3 BOTTLE | Refills: 3 | Status: SHIPPED
Start: 2020-04-15 | End: 2021-01-07 | Stop reason: SDUPTHER

## 2020-06-08 ENCOUNTER — OFFICE VISIT (OUTPATIENT)
Dept: FAMILY MEDICINE CLINIC | Age: 65
End: 2020-06-08
Payer: MEDICARE

## 2020-06-08 VITALS
DIASTOLIC BLOOD PRESSURE: 68 MMHG | RESPIRATION RATE: 18 BRPM | HEART RATE: 74 BPM | SYSTOLIC BLOOD PRESSURE: 110 MMHG | TEMPERATURE: 97.8 F | OXYGEN SATURATION: 98 % | BODY MASS INDEX: 32.94 KG/M2 | HEIGHT: 62 IN | WEIGHT: 179 LBS

## 2020-06-08 LAB
CHP ED QC CHECK: NORMAL
GLUCOSE BLD-MCNC: 122 MG/DL
HBA1C MFR BLD: 6.5 %

## 2020-06-08 PROCEDURE — 1123F ACP DISCUSS/DSCN MKR DOCD: CPT | Performed by: NURSE PRACTITIONER

## 2020-06-08 PROCEDURE — 2022F DILAT RTA XM EVC RTNOPTHY: CPT | Performed by: NURSE PRACTITIONER

## 2020-06-08 PROCEDURE — 3017F COLORECTAL CA SCREEN DOC REV: CPT | Performed by: NURSE PRACTITIONER

## 2020-06-08 PROCEDURE — 4004F PT TOBACCO SCREEN RCVD TLK: CPT | Performed by: NURSE PRACTITIONER

## 2020-06-08 PROCEDURE — 82962 GLUCOSE BLOOD TEST: CPT | Performed by: NURSE PRACTITIONER

## 2020-06-08 PROCEDURE — G8417 CALC BMI ABV UP PARAM F/U: HCPCS | Performed by: NURSE PRACTITIONER

## 2020-06-08 PROCEDURE — G8399 PT W/DXA RESULTS DOCUMENT: HCPCS | Performed by: NURSE PRACTITIONER

## 2020-06-08 PROCEDURE — 83036 HEMOGLOBIN GLYCOSYLATED A1C: CPT | Performed by: NURSE PRACTITIONER

## 2020-06-08 PROCEDURE — 1090F PRES/ABSN URINE INCON ASSESS: CPT | Performed by: NURSE PRACTITIONER

## 2020-06-08 PROCEDURE — G8427 DOCREV CUR MEDS BY ELIG CLIN: HCPCS | Performed by: NURSE PRACTITIONER

## 2020-06-08 PROCEDURE — 99213 OFFICE O/P EST LOW 20 MIN: CPT | Performed by: NURSE PRACTITIONER

## 2020-06-08 PROCEDURE — 4040F PNEUMOC VAC/ADMIN/RCVD: CPT | Performed by: NURSE PRACTITIONER

## 2020-06-08 PROCEDURE — 3044F HG A1C LEVEL LT 7.0%: CPT | Performed by: NURSE PRACTITIONER

## 2020-06-08 RX ORDER — PANTOPRAZOLE SODIUM 40 MG/1
40 TABLET, DELAYED RELEASE ORAL EVERY OTHER DAY
Qty: 45 TABLET | Refills: 2 | Status: CANCELLED | OUTPATIENT
Start: 2020-06-08

## 2020-06-08 ASSESSMENT — ENCOUNTER SYMPTOMS
COUGH: 0
DIARRHEA: 0
CHEST TIGHTNESS: 0
RHINORRHEA: 0
BACK PAIN: 0
CONSTIPATION: 0
SINUS PRESSURE: 0
COLOR CHANGE: 0
WHEEZING: 0
VOMITING: 0
TROUBLE SWALLOWING: 0
NAUSEA: 0
SHORTNESS OF BREATH: 0
VOICE CHANGE: 0
FACIAL SWELLING: 0
SINUS PAIN: 0
SORE THROAT: 0
ABDOMINAL PAIN: 0

## 2020-08-06 ENCOUNTER — HOSPITAL ENCOUNTER (OUTPATIENT)
Dept: MAMMOGRAPHY | Age: 65
Discharge: HOME | End: 2020-08-08
Payer: MEDICARE

## 2020-09-09 ENCOUNTER — OFFICE VISIT (OUTPATIENT)
Dept: FAMILY MEDICINE CLINIC | Age: 65
End: 2020-09-09
Payer: MEDICARE

## 2020-09-09 VITALS
TEMPERATURE: 97.2 F | HEART RATE: 88 BPM | SYSTOLIC BLOOD PRESSURE: 112 MMHG | BODY MASS INDEX: 33.49 KG/M2 | HEIGHT: 62 IN | RESPIRATION RATE: 20 BRPM | DIASTOLIC BLOOD PRESSURE: 72 MMHG | WEIGHT: 182 LBS | OXYGEN SATURATION: 96 %

## 2020-09-09 PROBLEM — Z12.31 SCREENING MAMMOGRAM FOR HIGH-RISK PATIENT: Status: ACTIVE | Noted: 2020-02-19

## 2020-09-09 PROBLEM — F17.200 TOBACCO USE DISORDER: Status: ACTIVE | Noted: 2020-09-09

## 2020-09-09 LAB
CHP ED QC CHECK: NORMAL
GLUCOSE BLD-MCNC: 129 MG/DL
HBA1C MFR BLD: 6.6 %

## 2020-09-09 PROCEDURE — 4004F PT TOBACCO SCREEN RCVD TLK: CPT | Performed by: NURSE PRACTITIONER

## 2020-09-09 PROCEDURE — 1090F PRES/ABSN URINE INCON ASSESS: CPT | Performed by: NURSE PRACTITIONER

## 2020-09-09 PROCEDURE — 99214 OFFICE O/P EST MOD 30 MIN: CPT | Performed by: NURSE PRACTITIONER

## 2020-09-09 PROCEDURE — 4040F PNEUMOC VAC/ADMIN/RCVD: CPT | Performed by: NURSE PRACTITIONER

## 2020-09-09 PROCEDURE — 3044F HG A1C LEVEL LT 7.0%: CPT | Performed by: NURSE PRACTITIONER

## 2020-09-09 PROCEDURE — 2022F DILAT RTA XM EVC RTNOPTHY: CPT | Performed by: NURSE PRACTITIONER

## 2020-09-09 PROCEDURE — 3017F COLORECTAL CA SCREEN DOC REV: CPT | Performed by: NURSE PRACTITIONER

## 2020-09-09 PROCEDURE — G8427 DOCREV CUR MEDS BY ELIG CLIN: HCPCS | Performed by: NURSE PRACTITIONER

## 2020-09-09 PROCEDURE — G8399 PT W/DXA RESULTS DOCUMENT: HCPCS | Performed by: NURSE PRACTITIONER

## 2020-09-09 PROCEDURE — 82962 GLUCOSE BLOOD TEST: CPT | Performed by: NURSE PRACTITIONER

## 2020-09-09 PROCEDURE — 1123F ACP DISCUSS/DSCN MKR DOCD: CPT | Performed by: NURSE PRACTITIONER

## 2020-09-09 PROCEDURE — 83036 HEMOGLOBIN GLYCOSYLATED A1C: CPT | Performed by: NURSE PRACTITIONER

## 2020-09-09 PROCEDURE — G8417 CALC BMI ABV UP PARAM F/U: HCPCS | Performed by: NURSE PRACTITIONER

## 2020-09-09 RX ORDER — NICOTINE 21 MG/24HR
1 PATCH, TRANSDERMAL 24 HOURS TRANSDERMAL DAILY
Qty: 42 PATCH | Refills: 0 | Status: SHIPPED
Start: 2020-09-09 | End: 2021-01-07 | Stop reason: SDUPTHER

## 2020-09-09 RX ORDER — PANTOPRAZOLE SODIUM 40 MG/1
40 TABLET, DELAYED RELEASE ORAL DAILY
Qty: 90 TABLET | Refills: 3 | Status: SHIPPED
Start: 2020-09-09 | End: 2021-01-07 | Stop reason: SDUPTHER

## 2020-09-09 ASSESSMENT — ENCOUNTER SYMPTOMS
FACIAL SWELLING: 0
COLOR CHANGE: 0
WHEEZING: 0
SHORTNESS OF BREATH: 0
DIARRHEA: 0
VOMITING: 0
CONSTIPATION: 0
BACK PAIN: 0
SINUS PAIN: 0
VOICE CHANGE: 0
ABDOMINAL PAIN: 0
COUGH: 1
SORE THROAT: 0
CHEST TIGHTNESS: 0
NAUSEA: 0
TROUBLE SWALLOWING: 0
SINUS PRESSURE: 0
RHINORRHEA: 0

## 2020-09-09 NOTE — PROGRESS NOTES
Does not apply route 2 times daily, Disp: 100 each, Rfl: 3    ibuprofen (ADVIL;MOTRIN) 800 MG tablet, Take 1 tablet by mouth every 6 hours as needed for Pain, Disp: 120 tablet, Rfl: 0    blood glucose monitor kit and supplies, Test 2 times a day. Please fill with insurance approved as we will not prior auth. DX:E11.9, Disp: 1 kit, Rfl: 0    Handicap Placard MISC, by Does not apply route Unable to ambulate more than 100 feet without difficulty Expires 2022, Disp: 1 each, Rfl: 0    aspirin 81 MG tablet, Take 81 mg by mouth daily, Disp: , Rfl:     hydroCHLOROthiazide (HYDRODIURIL) 25 MG tablet, TAKE 1 TABLET ONCE DAILY FOR BLOOD PRESSURE, Disp: 90 tablet, Rfl: 3  Social History     Socioeconomic History    Marital status: Single     Spouse name: None    Number of children: None    Years of education: None    Highest education level: None   Occupational History    None   Social Needs    Financial resource strain: None    Food insecurity     Worry: None     Inability: None    Transportation needs     Medical: None     Non-medical: None   Tobacco Use    Smoking status: Current Every Day Smoker     Packs/day: 0.25     Years: 5.00     Pack years: 1.25     Types: Cigarettes     Start date: 3/20/2017     Last attempt to quit: 10/25/2017     Years since quittin.8    Smokeless tobacco: Never Used    Tobacco comment: 20 years 1 pack last 2 days - also starting to use E cig to cut back on usage.     Substance and Sexual Activity    Alcohol use: No     Alcohol/week: 0.0 standard drinks    Drug use: No    Sexual activity: Yes   Lifestyle    Physical activity     Days per week: None     Minutes per session: None    Stress: None   Relationships    Social connections     Talks on phone: None     Gets together: None     Attends Muslim service: None     Active member of club or organization: None     Attends meetings of clubs or organizations: None     Relationship status: None    Intimate partner violence     Fear of current or ex partner: None     Emotionally abused: None     Physically abused: None     Forced sexual activity: None   Other Topics Concern    None   Social History Narrative    None       I have reviewed Chesters allergies, medications, problem list, medical, social and family history and have updated as needed in the electronic medical record    Review of Systems   Constitutional: Negative for activity change, appetite change, chills, diaphoresis, fatigue, fever and unexpected weight change. HENT: Negative for congestion, dental problem, drooling, ear discharge, ear pain, facial swelling, hearing loss, mouth sores, nosebleeds, postnasal drip, rhinorrhea, sinus pressure, sinus pain, sneezing, sore throat, tinnitus, trouble swallowing and voice change. Eyes: Negative for visual disturbance. Respiratory: Positive for cough. Negative for chest tightness, shortness of breath and wheezing. Cardiovascular: Negative for chest pain, palpitations and leg swelling. Gastrointestinal: Negative for abdominal pain, constipation, diarrhea, nausea and vomiting. Heartburn, belching   Endocrine: Negative for cold intolerance, heat intolerance, polydipsia, polyphagia and polyuria. Genitourinary: Negative for difficulty urinating, frequency and urgency. Musculoskeletal: Negative for arthralgias, back pain, gait problem, joint swelling, myalgias, neck pain and neck stiffness. Skin: Negative for color change, pallor, rash and wound. Allergic/Immunologic: Negative for environmental allergies, food allergies and immunocompromised state. Neurological: Negative for dizziness, tremors, seizures, syncope, facial asymmetry, speech difficulty, weakness, light-headedness, numbness and headaches. Hematological: Negative for adenopathy. Does not bruise/bleed easily.    Psychiatric/Behavioral: Negative for agitation, behavioral problems, confusion, decreased concentration, dysphoric mood, hallucinations, self-injury, sleep disturbance and suicidal ideas. The patient is not nervous/anxious and is not hyperactive. OBJECTIVE:     VS:  Wt Readings from Last 3 Encounters:   09/09/20 182 lb (82.6 kg)   06/08/20 179 lb (81.2 kg)   03/06/20 176 lb 8 oz (80.1 kg)                       Vitals:    09/09/20 1325   BP: 112/72   Pulse: 88   Resp: 20   Temp: 97.2 °F (36.2 °C)   SpO2: 96%   Weight: 182 lb (82.6 kg)   Height: 5' 2\" (1.575 m)       General: Alert and oriented to person, place, and time, well developed and well nourished, in no acute distress  SKIN: Warm and dry, intact without any rash, masses or lesions  HEAD: normocephalic, atraumatic  Eyes: sclera/conjunctiva clear, PERRLA, EOMI's intact  Neck: supple and non-tender without mass, trachea midline, no cervical lymphadenopathy, no bruit, no thyromegaly or nodules  Cardiovascular: regular rate and regular rhythm,  1/6 systolic at 2nd right intercostal space, throughout the precordium, radiates to axilla, radiates to carotids, no rubs, clicks, or gallop. Distal pulses intact. No edema  Pulmonary/Chest: clear to auscultation bilaterally, no wheezes, rales or rhonchi, normal air movement, no respiratory distress  Abdomen: soft, non-tender, non-distended, normal bowel sounds, no masses or hepatosplenomegaly  Musculoskeletal: Normal ROM, no joint swelling, deformity or tenderness   Neurologic: reflexes normal and symmetric, no cranial nerve deficit, gait, coordination and speech normal  Extremities: no clubbing, cyanosis, or edema. Psychiatric: Good eye contact, normal mood and affect, answers questions appropriately    ASSESSMENT/PLAN   Bret Conklin was seen today for diabetes and health maintenance.     Diagnoses and all orders for this visit:    Type 2 diabetes mellitus without complication, without long-term current use of insulin (HCC) stable  -     POCT glycosylated hemoglobin (Hb A1C)  -     POCT Glucose  Monitor BS at different times: 1 day fasting, then next day 2 hours after lunch, next day 2 hours after dinner, next day at bedtime then start over and log all values. Bring log to next appointment  Foot exam every day; wash and dry well between toes, look for any redness, cracks, wounds notify provider if any problems occur  Reminder for annual Eye exam  Reminder for Podiatry visits Q 2 Months for toenail care if needed  Reminder to keep vaccines up dated  Exercise 30 minutes daily  Recommend Diabetic Education Classes if you have not already attended  Essential hypertension stable  -Discussed taking medication and directed every day. -Discussed exercising daily 30 minutes 5 times a week for 150 minutes weekly.  -Discussed weight reduction if needed.  -Discussed low sodium diet.  -Discussed limiting caffeine consumption and tobacco cessation and the effects they have on the heart and blood pressure. Tobacco use disorder  -     nicotine (NICODERM CQ) 14 MG/24HR; Place 1 patch onto the skin daily  Remove at 6 pm if any dreams or nightmares  Do not smoke with patch on    Gastroesophageal reflux disease with esophagitis worsening  -     pantoprazole (PROTONIX) 40 MG tablet; Take 1 tablet by mouth daily  -Discussed elevated the HOB 30 degrees  -Discussed limiting spicy, fatty, greasy foods  -Discussed limit caffeine consumption to 1 - 2 cups daily  -Discussed waiting at least 3 - 4 hours before going to bed after eating  -Discussed not to eat and bend over immediately or lift heavy items.  -Discussed weight reduction if needed even 5 - 10 pounds.  -Discussed taking medications 1 hour prior to eating. Screening mammogram for high-risk patient  -     [employer] DIGITAL SCREEN W OR WO CAD BILATERAL; Future      Patient advised regarding steps to help prevent the spread of COVID-19   SOURCE - https://akash-gary.info/. html      1-Stay home except to get medical care  2-Clean your hands often for atleast 20 seconds, avoid touching: Avoid touching your eyes, nose, and mouth with unwashed hands. 3-Seek medical attention: Seek prompt medical attention if your illness is worsening (e.g., difficulty breathing). Call you doctor first.  3-Wear a facemask if you are sick               4-Cover your coughs and sneezes           Return in about 3 months (around 12/9/2020) for DM, HTN, hyperlipidemia, depression. Discussed reducing caffeine intake, Discussed smoking cessation, Discussed weight loss, Discussed exercising 30 minutes daily and Discussed taking medications as directed and adverse effects        I have reviewed my findings and recommendations with Elridge Lefort.     Sarah Soares, NP-C, FNP-BC

## 2020-09-09 NOTE — PATIENT INSTRUCTIONS
Patient advised regarding steps to help prevent the spread of COVID-19   SOURCE - https://akash-gary.info/. html      1-Stay home except to get medical care  2-Clean your hands often for atleast 20 seconds, avoid touching: Avoid touching your eyes, nose, and mouth with unwashed hands. 3-Seek medical attention: Seek prompt medical attention if your illness is worsening (e.g., difficulty breathing).   Call you doctor first.  3-Wear a facemask if you are sick               4-Cover your coughs and sneezes

## 2020-12-09 ENCOUNTER — OFFICE VISIT (OUTPATIENT)
Dept: FAMILY MEDICINE CLINIC | Age: 65
End: 2020-12-09
Payer: MEDICARE

## 2020-12-09 VITALS
TEMPERATURE: 97 F | HEIGHT: 62 IN | HEART RATE: 88 BPM | RESPIRATION RATE: 16 BRPM | DIASTOLIC BLOOD PRESSURE: 70 MMHG | BODY MASS INDEX: 35.19 KG/M2 | WEIGHT: 191.2 LBS | OXYGEN SATURATION: 97 % | SYSTOLIC BLOOD PRESSURE: 138 MMHG

## 2020-12-09 PROBLEM — R71.8 ELEVATED RED BLOOD CELL COUNT: Status: RESOLVED | Noted: 2019-09-13 | Resolved: 2020-12-09

## 2020-12-09 LAB
CHP ED QC CHECK: NORMAL
CREATININE URINE POCT: NORMAL
GLUCOSE BLD-MCNC: 98 MG/DL
HBA1C MFR BLD: 6.6 %
MICROALBUMIN/CREAT 24H UR: NORMAL MG/G{CREAT}
MICROALBUMIN/CREAT UR-RTO: NORMAL

## 2020-12-09 PROCEDURE — G8427 DOCREV CUR MEDS BY ELIG CLIN: HCPCS | Performed by: NURSE PRACTITIONER

## 2020-12-09 PROCEDURE — G8399 PT W/DXA RESULTS DOCUMENT: HCPCS | Performed by: NURSE PRACTITIONER

## 2020-12-09 PROCEDURE — 1090F PRES/ABSN URINE INCON ASSESS: CPT | Performed by: NURSE PRACTITIONER

## 2020-12-09 PROCEDURE — 82962 GLUCOSE BLOOD TEST: CPT | Performed by: NURSE PRACTITIONER

## 2020-12-09 PROCEDURE — G8417 CALC BMI ABV UP PARAM F/U: HCPCS | Performed by: NURSE PRACTITIONER

## 2020-12-09 PROCEDURE — 2022F DILAT RTA XM EVC RTNOPTHY: CPT | Performed by: NURSE PRACTITIONER

## 2020-12-09 PROCEDURE — 82044 UR ALBUMIN SEMIQUANTITATIVE: CPT | Performed by: NURSE PRACTITIONER

## 2020-12-09 PROCEDURE — 3017F COLORECTAL CA SCREEN DOC REV: CPT | Performed by: NURSE PRACTITIONER

## 2020-12-09 PROCEDURE — G8484 FLU IMMUNIZE NO ADMIN: HCPCS | Performed by: NURSE PRACTITIONER

## 2020-12-09 PROCEDURE — 4004F PT TOBACCO SCREEN RCVD TLK: CPT | Performed by: NURSE PRACTITIONER

## 2020-12-09 PROCEDURE — 1123F ACP DISCUSS/DSCN MKR DOCD: CPT | Performed by: NURSE PRACTITIONER

## 2020-12-09 PROCEDURE — 83036 HEMOGLOBIN GLYCOSYLATED A1C: CPT | Performed by: NURSE PRACTITIONER

## 2020-12-09 PROCEDURE — 99214 OFFICE O/P EST MOD 30 MIN: CPT | Performed by: NURSE PRACTITIONER

## 2020-12-09 PROCEDURE — 3044F HG A1C LEVEL LT 7.0%: CPT | Performed by: NURSE PRACTITIONER

## 2020-12-09 PROCEDURE — 4040F PNEUMOC VAC/ADMIN/RCVD: CPT | Performed by: NURSE PRACTITIONER

## 2020-12-09 RX ORDER — HYDROCHLOROTHIAZIDE 25 MG/1
TABLET ORAL
Qty: 90 TABLET | Refills: 3 | Status: CANCELLED | OUTPATIENT
Start: 2020-12-09

## 2020-12-09 RX ORDER — LEVOTHYROXINE SODIUM 0.07 MG/1
75 TABLET ORAL DAILY
Qty: 90 TABLET | Refills: 3 | Status: CANCELLED | OUTPATIENT
Start: 2020-12-09

## 2020-12-09 RX ORDER — LIRAGLUTIDE 6 MG/ML
1.8 INJECTION SUBCUTANEOUS DAILY
Qty: 27 PEN | Refills: 3 | Status: CANCELLED | OUTPATIENT
Start: 2020-12-09 | End: 2021-03-09

## 2020-12-09 RX ORDER — VENLAFAXINE HYDROCHLORIDE 150 MG/1
CAPSULE, EXTENDED RELEASE ORAL
Qty: 30 CAPSULE | Refills: 1 | Status: SHIPPED
Start: 2020-12-09 | End: 2021-01-07 | Stop reason: SDUPTHER

## 2020-12-09 RX ORDER — PANTOPRAZOLE SODIUM 40 MG/1
40 TABLET, DELAYED RELEASE ORAL DAILY
Qty: 90 TABLET | Refills: 3 | Status: CANCELLED | OUTPATIENT
Start: 2020-12-09

## 2020-12-09 RX ORDER — ROSUVASTATIN CALCIUM 20 MG/1
20 TABLET, COATED ORAL DAILY
Qty: 90 TABLET | Refills: 2 | Status: CANCELLED | OUTPATIENT
Start: 2020-12-09

## 2020-12-09 ASSESSMENT — ENCOUNTER SYMPTOMS
VOICE CHANGE: 0
VOMITING: 0
DIARRHEA: 0
SINUS PRESSURE: 0
RHINORRHEA: 0
FACIAL SWELLING: 0
BACK PAIN: 0
COLOR CHANGE: 0
WHEEZING: 0
COUGH: 0
TROUBLE SWALLOWING: 0
NAUSEA: 0
SHORTNESS OF BREATH: 0
ABDOMINAL PAIN: 0
CONSTIPATION: 0
CHEST TIGHTNESS: 0
SINUS PAIN: 0
SORE THROAT: 0

## 2020-12-09 ASSESSMENT — PATIENT HEALTH QUESTIONNAIRE - PHQ9
SUM OF ALL RESPONSES TO PHQ QUESTIONS 1-9: 1
SUM OF ALL RESPONSES TO PHQ QUESTIONS 1-9: 1
2. FEELING DOWN, DEPRESSED OR HOPELESS: 1
SUM OF ALL RESPONSES TO PHQ QUESTIONS 1-9: 1
SUM OF ALL RESPONSES TO PHQ9 QUESTIONS 1 & 2: 1
1. LITTLE INTEREST OR PLEASURE IN DOING THINGS: 0

## 2020-12-09 NOTE — PROGRESS NOTES
OFFICE PROGRESS NOTE  [address]  [address] [address]  Dept: [phone]   Chief Complaint   Patient presents with    Diabetes    Hypertension    Hyperlipidemia    Hypothyroidism         HPI:     77 y.o. female presents today for follow-up of diabetes [place]. In-office bloodsugar is:   Results for POC orders placed in visit on 12/09/20   POCT microalbumin   Result Value Ref Range    Microalb, Ur 100 mg/l     Creatinine Ur POCT 100 mg/dl     Microalbumin Creatinine Ratio <30 mg/g    POCT glycosylated hemoglobin (Hb A1C)   Result Value Ref Range    Hemoglobin A1C 6.6 %   POCT Glucose   Result Value Ref Range    Glucose 98 mg/dL    QC OK? Patient reports being compliant to low carbohydrate diet and increasing weekly exercises. Currently, the patient is treated with medication(s): Victoza 1.8 mg daily. Patient reports checking home blood sugar 1 times per week. Patient states home blood sugar ranges from . Patient denies hypoglycemic episodes and understand to take sweetened beverages or a snack if hypoglycemic symptoms are suspected. She was bringing in the groceries 2 weeks ago and tripped over the cat and fell onto the right knee and right foot/great toe ecchymotic and sore. Hypertension: Patient here for follow-up of elevated blood pressure. She is not exercising and is adherent to low salt diet. Blood pressure is well controlled at home. Cardiac symptoms none. Patient denies chest pain, chest pressure/discomfort, claudication, dyspnea, exertional chest pressure/discomfort, fatigue, irregular heart beat, lower extremity edema, near-syncope, orthopnea, palpitations, paroxysmal nocturnal dyspnea, syncope and tachypnea.   Cardiovascular risk factors: advanced age (older than 54 for men, 72 for women), diabetes mellitus, dyslipidemia, hypertension, obesity (BMI >= 30 kg/m2), sedentary lifestyle and smoking/ tobacco exposure. Use of agents associated with hypertension: thyroid hormones. History of target organ damage: none. Hyperlipidemia: Patient presents with hyperlipidemia. She was tested because DM, hypertension, hyperlipidemia. Her last labs showed Total cholesterol of 161, HDL 54, LDL 72,  Triglycerides 175. Denies  chest pain, dyspnea, exertional chest pressure/discomfort, fatigue, feeding intolerance, lower extremity edema, palpitations, poor exercise tolerance, syncope, tachypnea and skin xanthelasma. There is a family history of hyperlipidemia. There is not a family history of early ischemia heart disease. Depression: Patient complains of depression. She complains of depressed mood, hopelessness and insomnia. Onset was approximately several years ago, gradually worsening since that time. She denies current suicidal and homicidal plan or intent. Family history significant for no psychiatric illness. Possible organic causes contributing are: none. Risk factors: positive family history in  daughter, negative life event daughter has cancer and previous episode of depression Previous treatment includes Effexor . She complains of the following side effects from the treatment: none.         Today's vital signs are as follows:  /70   Pulse 88   Temp 97 °F (36.1 °C) (Temporal)   Resp 16   Ht 5' 2\" (1.575 m)   Wt 191 lb 3.2 oz (86.7 kg)   LMP 03/01/1978 (Approximate)   SpO2 97%   BMI 34.97 kg/m²     Lab Results   Component Value Date    LABA1C 6.6 12/09/2020     Pain scale: 4/10        Current Outpatient Medications:     venlafaxine (EFFEXOR XR) 150 MG extended release capsule, TAKE 1 CAPSULE BY MOUTH ONCE DAILY, Disp: 30 capsule, Rfl: 1    hydroCHLOROthiazide (HYDRODIURIL) 25 MG tablet, TAKE 1 TABLET ONCE DAILY FOR BLOOD PRESSURE, Disp: 90 tablet, Rfl: 3    pantoprazole (PROTONIX) 40 MG tablet, Take 1 tablet by mouth daily, Disp: 90 tablet, Rfl: 3    Handicap Placard MISC, by NOT APPLICABLE route, Disp: , Rfl:     BD PEN NEEDLE SHANEL U/F 32G X 4 MM MISC, Inject 1 each into the skin daily, Disp: 100 each, Rfl: 4    fluticasone (FLONASE) 50 MCG/ACT nasal spray, 1 spray by Each Nostril route daily, Disp: 3 Bottle, Rfl: 3    Blood Glucose Monitoring Suppl (TRUE METRIX METER) w/Device KIT, Test two times a day., Disp: 1 kit, Rfl: 0    rosuvastatin (CRESTOR) 20 MG tablet, Take 1 tablet by mouth daily For cholesterol, Disp: 90 tablet, Rfl: 2    levothyroxine (SYNTHROID) 75 MCG tablet, Take 1 tablet by mouth daily 1 hour before breakfast For thyroid, Disp: 90 tablet, Rfl: 3    blood glucose monitor strips, Test 2 times a day & as needed for symptoms of irregular blood glucose., Disp: 100 strip, Rfl: 3    Lancets MISC, 1 each by Does not apply route 2 times daily, Disp: 100 each, Rfl: 3    ibuprofen (ADVIL;MOTRIN) 800 MG tablet, Take 1 tablet by mouth every 6 hours as needed for Pain, Disp: 120 tablet, Rfl: 0    blood glucose monitor kit and supplies, Test 2 times a day. Please fill with insurance approved as we will not prior auth.   DX:E11.9, Disp: 1 kit, Rfl: 0    Handicap Placard MISC, by Does not apply route Unable to ambulate more than 100 feet without difficulty Expires 1/16/2022, Disp: 1 each, Rfl: 0    aspirin 81 MG tablet, Take 81 mg by mouth daily, Disp: , Rfl:     nicotine (NICODERM CQ) 14 MG/24HR, Place 1 patch onto the skin daily, Disp: 42 patch, Rfl: 0    Liraglutide (VICTOZA) 18 MG/3ML SOPN SC injection, Inject 1.8 mg into the skin daily, Disp: 27 pen, Rfl: 3  Social History     Socioeconomic History    Marital status: Single     Spouse name: None    Number of children: None    Years of education: None    Highest education level: None   Occupational History    None   Social Needs    Financial resource strain: None    Food insecurity     Worry: None     Inability: None    Transportation needs     Medical: None     Non-medical: None   Tobacco Use    Smoking status: Current Every Day Smoker     Packs/day: 0.25     Years: 5.00     Pack years: 1.25     Types: Cigarettes     Start date: 3/20/2017     Last attempt to quit: 10/25/2017     Years since quitting: 3.1    Smokeless tobacco: Never Used    Tobacco comment: 20 years 1 pack last 2 days - also starting to use E cig to cut back on usage. Substance and Sexual Activity    Alcohol use: No     Alcohol/week: 0.0 standard drinks    Drug use: No    Sexual activity: Yes   Lifestyle    Physical activity     Days per week: None     Minutes per session: None    Stress: None   Relationships    Social connections     Talks on phone: None     Gets together: None     Attends Spiritism service: None     Active member of club or organization: None     Attends meetings of clubs or organizations: None     Relationship status: None    Intimate partner violence     Fear of current or ex partner: None     Emotionally abused: None     Physically abused: None     Forced sexual activity: None   Other Topics Concern    None   Social History Narrative    None       I have reviewed Selena's allergies, medications, problem list, medical, social and family history and have updated as needed in the electronic medical record    Review of Systems   Constitutional: Negative for activity change, appetite change, chills, diaphoresis, fatigue, fever and unexpected weight change. HENT: Negative for congestion, dental problem, drooling, ear discharge, ear pain, facial swelling, hearing loss, mouth sores, nosebleeds, postnasal drip, rhinorrhea, sinus pressure, sinus pain, sneezing, sore throat, tinnitus, trouble swallowing and voice change. Eyes: Negative for visual disturbance. Respiratory: Negative for cough, chest tightness, shortness of breath and wheezing. Cardiovascular: Negative for chest pain, palpitations and leg swelling. Gastrointestinal: Negative for abdominal pain, constipation, diarrhea, nausea and vomiting.    Endocrine: Negative for cold movement, no respiratory distress  Abdomen: soft, non-tender, non-distended, normal bowel sounds, no masses or hepatosplenomegaly  Musculoskeletal: Normal ROM, no joint swelling, deformity or tenderness   Neurologic: reflexes normal and symmetric, no cranial nerve deficit, gait, coordination and speech normal  Extremities: no clubbing, cyanosis, or edema, right great toe ecchymotic. Psychiatric: Good eye contact, normal mood and affect, answers questions appropriately  Foot Exam:  No signs of infection and/or necrosis noted. Sensation intact and equal bilaterally. Monofilament normal bilateral,  Interdigit spaces exhibit no abnormal skin changes and/or surface growth. ASSESSMENT/PLAN   Loreta Chicas was seen today for diabetes, hypertension, hyperlipidemia and hypothyroidism. Diagnoses and all orders for this visit:    Type 2 diabetes mellitus without complication, without long-term current use of insulin (HCC) well controlled  -     CBC Auto Differential; Future  -     Comprehensive Metabolic Panel; Future  -     Lipid Panel; Future  -      DIABETES FOOT EXAM  -     POCT glycosylated hemoglobin (Hb A1C)  -     POCT Glucose  -     POCT microalbumin  Monitor BS at different times: 1 day fasting, then next day 2 hours after lunch, next day 2 hours after dinner, next day at bedtime then start over and log all values. Bring log to next appointment  Foot exam every day; wash and dry well between toes, look for any redness, cracks, wounds notify provider if any problems occur  Reminder for annual Eye exam  Reminder for Podiatry visits Q 2 Months for toenail care if needed  Reminder to keep vaccines up dated  Exercise 30 minutes daily  Recommend Diabetic Education Classes if you have not already attended    Fall in home, initial encounter    Essential hypertension stable  -     CBC Auto Differential; Future  -     Comprehensive Metabolic Panel; Future  -Discussed taking medication and directed every day.   -Discussed exercising daily 30 minutes 5 times a week for 150 minutes weekly.  -Discussed weight reduction if needed.  -Discussed low sodium diet.  -Discussed limiting caffeine consumption and tobacco cessation and the effects they have on the heart and blood pressure. Mixed hyperlipidemia stable  -     Lipid Panel; Future  -Discussed low fat diet, limit fast food, goodies, breads and pastas if consuming several days a week,  limit any alcohol consumption.  -Discussed weight reduction and exercise 30 minutes 5 days a week for total of 150 minutes weekly.  -Discussed if any unusual muscle aching/pain to contact the office, discussed medication and risk of muscle pain/damage from Rhabdomyolysis. Postoperative hypothyroidism  -     TSH without Reflex; Future  -     T4, Free; Future    Major depressive disorder with single episode, [facility] worsening  - Increase    venlafaxine (EFFEXOR XR) 150 MG extended release capsule; TAKE 1 CAPSULE BY MOUTH ONCE DAILY    Discussed adverse effects and if worsening depression to call me. Follow up in 1 month. Start to journal                Return in about 1 month (around 1/9/2021) for depression video visit. Discussed smoking cessation, Discussed weight loss, Discussed exercising 30 minutes daily and Discussed taking medications as directed and adverse effects        I have reviewed my findings and recommendations with Melissa Mireles.     Caitlin Franco, NP-C, FNP-BC

## 2020-12-09 NOTE — PATIENT INSTRUCTIONS
Patient Education        Depression and Chronic Disease: Care Instructions  Your Care Instructions     A chronic disease is one that you have for a long time. Some chronic diseases can be controlled, but they usually cannot be cured. Depression is common in people with chronic diseases, but it often goes unnoticed. Many people have concerns about seeking treatment for a mental health problem. You may think it's a sign of weakness, or you don't want people to know about it. It's important to overcome these reasons for not seeking treatment. Treating depression or anxiety is good for your health. Follow-up care is a key part of your treatment and safety. Be sure to make and go to all appointments, and call your doctor if you are having problems. It's also a good idea to know your test results and keep a list of the medicines you take. How can you care for yourself at home? Watch for symptoms of depression  The symptoms of depression are often subtle at first. You may think they are caused by your disease rather than depression. Or you may think it is normal to be depressed when you have a chronic disease. If you are depressed you may:  · Feel sad or hopeless. · Feel guilty or worthless. · Not enjoy the things you used to enjoy. · Feel hopeless, as though life is not worth living. · Have trouble thinking or remembering. · Have low energy, and you may not eat or sleep well. · Pull away from others. · Think often about death or killing yourself. (Keep the numbers for these national suicide hotlines: 1-557-023-TALK [1-169.786.1271] and 6-189-ITYPWEF [1-392.465.1850]. )  Get treatment  By treating your depression, you can feel more hopeful and have more energy. If you feel better, you may take better care of yourself, so your health may improve. · Talk to your doctor if you have any changes in mood during treatment for your disease. · Ask your doctor for help.  Counseling, antidepressant medicine, or a meals.  · Avoid using illegal drugs or marijuana and drinking alcohol. Do not take medicines that have not been prescribed for you. They may interfere with medicines you may be taking for depression, or they may make your depression worse. · Take your medicines exactly as they are prescribed. You may start to feel better within 1 to 3 weeks of taking antidepressant medicine. But it can take as many as 6 to 8 weeks to see more improvement. If you have questions or concerns about your medicines, or if you do not notice any improvement by 3 weeks, talk to your doctor. · Continue to take your medicine after your symptoms improve. Taking your medicine for at least 6 months after you feel better can help keep you from getting depressed again. If this isn't the first time you have been depressed, your doctor may recommend you to take medicine even longer. · If you have any side effects from your medicine, tell your doctor. Many side effects are mild and will go away on their own after you have been taking the medicine for a few weeks. Some may last longer. Talk to your doctor if side effects are bothering you too much. You might be able to try a different medicine. · Continue counseling. It may help prevent depression from returning, especially if you've had multiple episodes of depression. Talk with your counselor if you are having a hard time attending your sessions or you think the sessions aren't working. Don't just stop going. · Get enough sleep. Talk to your doctor if you are having problems sleeping. · Avoid sleeping pills unless they are prescribed by the doctor treating your depression. Sleeping pills may make you groggy during the day, and they may interact with other medicine you are taking. · If you have any other illnesses, such as diabetes, heart disease, or high blood pressure, make sure to continue with your treatment.  Tell your doctor about all of the medicines you take, including those with or without a prescription. · If you or someone you know talks about suicide, self-harm, or feeling hopeless, get help right away. Call the [address] at 1-800-273-talk (0-725.284.1990) or text HOME to 607879 to access the Crisis Text Line. Consider saving these numbers in your phone. When should you call for help? Call 911 anytime you think you may need emergency care. For example, call if:    · You feel like hurting yourself or someone else.     · Someone you know has depression and is about to attempt or is attempting suicide. Call your doctor now or seek immediate medical care if:    · You hear voices.     · Someone you know has depression and:  ? Starts to give away his or her possessions. ? Uses illegal drugs or drinks alcohol heavily. ? Talks or writes about death, including writing suicide notes or talking about guns, knives, or pills. ? Starts to spend a lot of time alone. ? Acts very aggressively or suddenly appears calm. Watch closely for changes in your health, and be sure to contact your doctor if:    · You do not get better as expected. Where can you learn more? Go to [email or web address]://[email or web address]. org and sign in to your Logentries account. Enter L151 in the [employer] box to learn more about \"Recovering From Depression: Care Instructions. \"     If you do not have an account, please click on the \"Sign Up Now\" link. Current as of: January 31, 2020               Content Version: 12.6  © 2006-2020 [employer]. Care instructions adapted under license by [facility] ([facility]). If you have questions about a medical condition or this instruction, always ask your healthcare professional. [place] [address] any warranty or liability for your use of this information.

## 2020-12-10 ENCOUNTER — HOSPITAL ENCOUNTER (OUTPATIENT)
Age: 65
Discharge: HOME | End: 2020-12-10
Payer: MEDICARE

## 2020-12-10 LAB
ALBUMIN SERPL-MCNC: 4.1 G/DL (ref 3.5–5.2)
ALP BLD-CCNC: 66 U/L (ref 35–104)
ALT SERPL-CCNC: 13 U/L (ref 0–32)
ANION GAP SERPL CALCULATED.3IONS-SCNC: 9 MMOL/L (ref 7–16)
AST SERPL-CCNC: 16 U/L (ref 0–31)
BASOPHILS ABSOLUTE: 0.07 E9/L (ref 0–0.2)
BASOPHILS RELATIVE PERCENT: 0.8 % (ref 0–2)
BILIRUB SERPL-MCNC: 0.8 MG/DL (ref 0–1.2)
BUN BLDV-MCNC: 10 MG/DL (ref 8–23)
CALCIUM SERPL-MCNC: 9.5 MG/DL (ref 8.6–10.2)
CHLORIDE BLD-SCNC: 94 MMOL/L (ref 98–107)
CHOLESTEROL, TOTAL: 137 MG/DL (ref 0–199)
CO2: 32 MMOL/L (ref 22–29)
CREAT SERPL-MCNC: 0.9 MG/DL (ref 0.5–1)
EOSINOPHILS ABSOLUTE: 0.11 E9/L (ref 0.05–0.5)
EOSINOPHILS RELATIVE PERCENT: 1.3 % (ref 0–6)
GFR AFRICAN AMERICAN: >60
GFR NON-AFRICAN AMERICAN: >60 ML/MIN/1.73
GLUCOSE BLD-MCNC: 127 MG/DL (ref 74–99)
HCT VFR BLD CALC: 49.2 % (ref 34–48)
HDLC SERPL-MCNC: 52 MG/DL
HEMOGLOBIN: 16.7 G/DL (ref 11.5–15.5)
IMMATURE GRANULOCYTES #: 0.06 E9/L
IMMATURE GRANULOCYTES %: 0.7 % (ref 0–5)
LDL CHOLESTEROL CALCULATED: 63 MG/DL (ref 0–99)
LYMPHOCYTES ABSOLUTE: 1.96 E9/L (ref 1.5–4)
LYMPHOCYTES RELATIVE PERCENT: 23.3 % (ref 20–42)
MCH RBC QN AUTO: 31 PG (ref 26–35)
MCHC RBC AUTO-ENTMCNC: 33.9 % (ref 32–34.5)
MCV RBC AUTO: 91.3 FL (ref 80–99.9)
MONOCYTES ABSOLUTE: 0.68 E9/L (ref 0.1–0.95)
MONOCYTES RELATIVE PERCENT: 8.1 % (ref 2–12)
NEUTROPHILS ABSOLUTE: 5.53 E9/L (ref 1.8–7.3)
NEUTROPHILS RELATIVE PERCENT: 65.8 % (ref 43–80)
PDW BLD-RTO: 13.8 FL (ref 11.5–15)
PLATELET # BLD: 306 E9/L (ref 130–450)
PMV BLD AUTO: 8.6 FL (ref 7–12)
POTASSIUM SERPL-SCNC: 4.3 MMOL/L (ref 3.5–5)
RBC # BLD: 5.39 E12/L (ref 3.5–5.5)
SODIUM BLD-SCNC: 135 MMOL/L (ref 132–146)
TOTAL PROTEIN: 7 G/DL (ref 6.4–8.3)
TRIGL SERPL-MCNC: 109 MG/DL (ref 0–149)
TSH SERPL DL<=0.05 MIU/L-ACNC: 4.23 UIU/ML (ref 0.27–4.2)
VLDLC SERPL CALC-MCNC: 22 MG/DL
WBC # BLD: 8.4 E9/L (ref 4.5–11.5)

## 2020-12-10 PROCEDURE — 80053 COMPREHEN METABOLIC PANEL: CPT

## 2020-12-10 PROCEDURE — 36415 COLL VENOUS BLD VENIPUNCTURE: CPT

## 2020-12-10 PROCEDURE — 85025 COMPLETE CBC W/AUTO DIFF WBC: CPT

## 2020-12-10 PROCEDURE — 80061 LIPID PANEL: CPT

## 2020-12-10 PROCEDURE — 84439 ASSAY OF FREE THYROXINE: CPT

## 2020-12-10 PROCEDURE — 84443 ASSAY THYROID STIM HORMONE: CPT

## 2020-12-11 LAB — T4 FREE: 1.23 NG/DL (ref 0.93–1.7)

## 2021-01-05 ENCOUNTER — TELEPHONE (OUTPATIENT)
Dept: FAMILY MEDICINE CLINIC | Age: 66
End: 2021-01-05

## 2021-01-07 DIAGNOSIS — I10 ESSENTIAL HYPERTENSION: ICD-10-CM

## 2021-01-07 DIAGNOSIS — F17.200 TOBACCO USE DISORDER: ICD-10-CM

## 2021-01-07 DIAGNOSIS — E11.9 TYPE 2 DIABETES MELLITUS WITHOUT COMPLICATION, WITHOUT LONG-TERM CURRENT USE OF INSULIN (HCC): ICD-10-CM

## 2021-01-07 DIAGNOSIS — J30.2 SEASONAL ALLERGIES: ICD-10-CM

## 2021-01-07 DIAGNOSIS — F32.5 MAJOR DEPRESSIVE DISORDER WITH SINGLE EPISODE, IN FULL REMISSION (HCC): ICD-10-CM

## 2021-01-07 DIAGNOSIS — E78.2 MIXED HYPERLIPIDEMIA: ICD-10-CM

## 2021-01-07 DIAGNOSIS — K21.00 GASTROESOPHAGEAL REFLUX DISEASE WITH ESOPHAGITIS: ICD-10-CM

## 2021-01-07 DIAGNOSIS — E89.0 POSTOPERATIVE HYPOTHYROIDISM: ICD-10-CM

## 2021-01-07 RX ORDER — LIRAGLUTIDE 6 MG/ML
1.8 INJECTION SUBCUTANEOUS DAILY
Qty: 27 PEN | Refills: 3 | Status: SHIPPED
Start: 2021-01-07 | End: 2021-08-23 | Stop reason: SDUPTHER

## 2021-01-07 RX ORDER — PEN NEEDLE, DIABETIC 32GX 5/32"
1 NEEDLE, DISPOSABLE MISCELLANEOUS DAILY
Qty: 100 EACH | Refills: 4 | Status: SHIPPED
Start: 2021-01-07 | End: 2022-06-04 | Stop reason: SDUPTHER

## 2021-01-07 RX ORDER — FLUTICASONE PROPIONATE 50 MCG
1 SPRAY, SUSPENSION (ML) NASAL DAILY
Qty: 3 BOTTLE | Refills: 3 | Status: SHIPPED
Start: 2021-01-07 | End: 2021-08-23

## 2021-01-07 RX ORDER — LANCETS 30 GAUGE
1 EACH MISCELLANEOUS 2 TIMES DAILY
Qty: 100 EACH | Refills: 3 | Status: SHIPPED
Start: 2021-01-07 | End: 2021-03-30

## 2021-01-07 RX ORDER — GLUCOSAMINE HCL/CHONDROITIN SU 500-400 MG
CAPSULE ORAL
Qty: 100 STRIP | Refills: 3 | Status: SHIPPED
Start: 2021-01-07 | End: 2021-03-30

## 2021-01-07 RX ORDER — LEVOTHYROXINE SODIUM 0.07 MG/1
75 TABLET ORAL DAILY
Qty: 90 TABLET | Refills: 3 | Status: SHIPPED
Start: 2021-01-07 | End: 2021-11-03 | Stop reason: SDUPTHER

## 2021-01-07 RX ORDER — ROSUVASTATIN CALCIUM 20 MG/1
20 TABLET, COATED ORAL DAILY
Qty: 90 TABLET | Refills: 2 | Status: SHIPPED
Start: 2021-01-07 | End: 2021-07-27

## 2021-01-07 RX ORDER — PANTOPRAZOLE SODIUM 40 MG/1
40 TABLET, DELAYED RELEASE ORAL DAILY
Qty: 90 TABLET | Refills: 1 | Status: SHIPPED
Start: 2021-01-07 | End: 2021-03-30

## 2021-01-07 RX ORDER — VENLAFAXINE HYDROCHLORIDE 150 MG/1
CAPSULE, EXTENDED RELEASE ORAL
Qty: 90 CAPSULE | Refills: 1 | Status: SHIPPED
Start: 2021-01-07 | End: 2021-03-30

## 2021-01-07 RX ORDER — IBUPROFEN 800 MG/1
800 TABLET ORAL EVERY 6 HOURS PRN
Qty: 120 TABLET | Refills: 0 | Status: SHIPPED
Start: 2021-01-07 | End: 2021-03-30

## 2021-01-07 RX ORDER — HYDROCHLOROTHIAZIDE 25 MG/1
TABLET ORAL
Qty: 90 TABLET | Refills: 3 | Status: SHIPPED
Start: 2021-01-07 | End: 2021-11-03 | Stop reason: SDUPTHER

## 2021-01-07 RX ORDER — NICOTINE 21 MG/24HR
1 PATCH, TRANSDERMAL 24 HOURS TRANSDERMAL DAILY
Qty: 42 PATCH | Refills: 0 | Status: SHIPPED
Start: 2021-01-07 | End: 2021-04-20 | Stop reason: ALTCHOICE

## 2021-01-07 NOTE — TELEPHONE ENCOUNTER
Patient already set up with [facility], Just need scripts sent to them.      Last Appointment   12/9/2020  Next Appointment  1/19/2021

## 2021-01-19 ENCOUNTER — VIRTUAL VISIT (OUTPATIENT)
Dept: FAMILY MEDICINE CLINIC | Age: 66
End: 2021-01-19
Payer: MEDICARE

## 2021-01-19 DIAGNOSIS — E78.2 MIXED HYPERLIPIDEMIA: Chronic | ICD-10-CM

## 2021-01-19 DIAGNOSIS — F32.5 MAJOR DEPRESSIVE DISORDER WITH SINGLE EPISODE, IN FULL REMISSION (HCC): Primary | ICD-10-CM

## 2021-01-19 DIAGNOSIS — E89.0 POSTOPERATIVE HYPOTHYROIDISM: Chronic | ICD-10-CM

## 2021-01-19 PROCEDURE — G0008 ADMIN INFLUENZA VIRUS VAC: HCPCS | Performed by: NURSE PRACTITIONER

## 2021-01-19 PROCEDURE — 4004F PT TOBACCO SCREEN RCVD TLK: CPT | Performed by: NURSE PRACTITIONER

## 2021-01-19 PROCEDURE — 1090F PRES/ABSN URINE INCON ASSESS: CPT | Performed by: NURSE PRACTITIONER

## 2021-01-19 PROCEDURE — 3017F COLORECTAL CA SCREEN DOC REV: CPT | Performed by: NURSE PRACTITIONER

## 2021-01-19 PROCEDURE — G8417 CALC BMI ABV UP PARAM F/U: HCPCS | Performed by: NURSE PRACTITIONER

## 2021-01-19 PROCEDURE — G8484 FLU IMMUNIZE NO ADMIN: HCPCS | Performed by: NURSE PRACTITIONER

## 2021-01-19 PROCEDURE — 4040F PNEUMOC VAC/ADMIN/RCVD: CPT | Performed by: NURSE PRACTITIONER

## 2021-01-19 PROCEDURE — 90694 VACC AIIV4 NO PRSRV 0.5ML IM: CPT | Performed by: NURSE PRACTITIONER

## 2021-01-19 PROCEDURE — 1123F ACP DISCUSS/DSCN MKR DOCD: CPT | Performed by: NURSE PRACTITIONER

## 2021-01-19 PROCEDURE — G8427 DOCREV CUR MEDS BY ELIG CLIN: HCPCS | Performed by: NURSE PRACTITIONER

## 2021-01-19 PROCEDURE — 99213 OFFICE O/P EST LOW 20 MIN: CPT | Performed by: NURSE PRACTITIONER

## 2021-01-19 PROCEDURE — G8399 PT W/DXA RESULTS DOCUMENT: HCPCS | Performed by: NURSE PRACTITIONER

## 2021-01-19 ASSESSMENT — ENCOUNTER SYMPTOMS
DIARRHEA: 0
COLOR CHANGE: 0
NAUSEA: 0
VOICE CHANGE: 0
SHORTNESS OF BREATH: 0
ABDOMINAL PAIN: 0
SINUS PRESSURE: 0
FACIAL SWELLING: 0
WHEEZING: 0
TROUBLE SWALLOWING: 0
COUGH: 1
RHINORRHEA: 0
SINUS PAIN: 0
SORE THROAT: 0

## 2021-01-19 ASSESSMENT — PATIENT HEALTH QUESTIONNAIRE - PHQ9
1. LITTLE INTEREST OR PLEASURE IN DOING THINGS: 0
SUM OF ALL RESPONSES TO PHQ QUESTIONS 1-9: 0

## 2021-01-19 NOTE — PROGRESS NOTES
OFFICE PROGRESS NOTE  [address]  [address] [id number]  Dept: [phone]   Chief Complaint   Patient presents with   [address] Maintenance     Due for Fit test.          HPI:   1/19/2021    TELEHEALTH EVALUATION -- Audio/Visual (During VSXNL-62 public health emergency)  TeleMedicine Video Visit    This visit was performed as a virtual video visit using a synchronous, two-way, audio-video telehealth technology platform. Patient identification was verified at the start of the visit, including the patient's telephone number and physical location. I discussed with the patient the nature of our telehealth visits, that:     1. Due to the nature of an audio- video modality, the only components of a physical exam that could be done are the elements supported by direct observation. 2. I would evaluate the patient and recommend diagnostics and treatments based on my assessment. 3. If it was felt that the patient should be evaluated in clinic or an emergency room setting, then they would be directed there. 4. Our sessions are not being recorded and that personal health information is protected. 5. Our team would provide follow up care in person if/when the patient needs it. Patient does agree to proceed with telemedicine consultation. Patient's location: home address in [facility]  Physician  location [facility]. other people involved in call none       Time spent: Greater than 20    This visit was completed virtually using Doxy. me        HPI:  Depression: Patient complains of depression and increasing the Effexor  mg daily. She complains of no depression symptoms at this time and negative depression screening today. depressed mood, hopelessness and insomnia have resolved. Onset was approximately several years ago, gradually worsening since that time. She denies current suicidal and homicidal plan or intent.    Family history significant for no psychiatric illness. Possible organic causes contributing are: none. Risk factors: positive family history in  daughter, negative life event daughter has cancer and previous episode of depression Previous treatment includes Effexor . She complains of the following side effects from the treatment: none. Review labs done for Hyperlipidemia which has been very well controlled on the Crestor 20 mg,  elevated H/H again after she started smoking again they are going to stop smoking 2/1/2021. has seen Dr Anupam Arora for further evaluation due to daughter having Polycythemia Vera. Found to not have this in patient. Her TSH was slightly elevated but she is asymptomatic at this time so will not change any medication doses.      Current Outpatient Medications:     venlafaxine (EFFEXOR XR) 150 MG extended release capsule, TAKE 1 CAPSULE BY MOUTH ONCE DAILY, Disp: 90 capsule, Rfl: 1    rosuvastatin (CRESTOR) 20 MG tablet, Take 1 tablet by mouth daily For cholesterol, Disp: 90 tablet, Rfl: 2    pantoprazole (PROTONIX) 40 MG tablet, Take 1 tablet by mouth daily, Disp: 90 tablet, Rfl: 1    nicotine (NICODERM CQ) 14 MG/24HR, Place 1 patch onto the skin daily, Disp: 42 patch, Rfl: 0    Liraglutide (VICTOZA) 18 MG/3ML SOPN SC injection, Inject 1.8 mg into the skin daily, Disp: 27 pen, Rfl: 3    levothyroxine (SYNTHROID) 75 MCG tablet, Take 1 tablet by mouth daily 1 hour before breakfast For thyroid, Disp: 90 tablet, Rfl: 3    Lancets MISC, 1 each by Does not apply route 2 times daily, Disp: 100 each, Rfl: 3    ibuprofen (ADVIL;MOTRIN) 800 MG tablet, Take 1 tablet by mouth every 6 hours as needed for Pain, Disp: 120 tablet, Rfl: 0    hydroCHLOROthiazide (HYDRODIURIL) 25 MG tablet, TAKE 1 TABLET ONCE DAILY FOR BLOOD PRESSURE, Disp: 90 tablet, Rfl: 3    fluticasone (FLONASE) 50 MCG/ACT nasal spray, 1 spray by Each Nostril route daily, Disp: 3 Bottle, Rfl: 3    blood glucose monitor strips, Test 2 times a day & as needed for symptoms of irregular blood glucose., Disp: 100 strip, Rfl: 3    BD PEN NEEDLE SHANEL U/F 32G X 4 MM MISC, Inject 1 each into the skin daily, Disp: 100 each, Rfl: 4    Handicap Placard MISC, by NOT APPLICABLE route, Disp: , Rfl:     Blood Glucose Monitoring Suppl (TRUE METRIX METER) w/Device KIT, Test two times a day., Disp: 1 kit, Rfl: 0    blood glucose monitor kit and supplies, Test 2 times a day. Please fill with insurance approved as we will not prior auth. DX:E11.9, Disp: 1 kit, Rfl: 0    Handicap Placard MISC, by Does not apply route Unable to ambulate more than 100 feet without difficulty Expires 1/16/2022, Disp: 1 each, Rfl: 0    aspirin 81 MG tablet, Take 81 mg by mouth daily, Disp: , Rfl:   Social History     Socioeconomic History    Marital status: Single     Spouse name: None    Number of children: None    Years of education: None    Highest education level: None   Occupational History    None   Social Needs    Financial resource strain: None    Food insecurity     Worry: None     Inability: None    Transportation needs     Medical: None     Non-medical: None   Tobacco Use    Smoking status: Current Every Day Smoker     Packs/day: 0.25     Years: 5.00     Pack years: 1.25     Types: Cigarettes     Start date: 3/20/2017     Last attempt to quit: 10/25/2017     Years since quitting: 3.2    Smokeless tobacco: Never Used    Tobacco comment: 20 years 1 pack last 2 days - also starting to use E cig to cut back on usage.     Substance and Sexual Activity    Alcohol use: No     Alcohol/week: 0.0 standard drinks    Drug use: No    Sexual activity: Yes   Lifestyle    Physical activity     Days per week: None     Minutes per session: None    Stress: None   Relationships    Social connections     Talks on phone: None     Gets together: None     Attends Evangel[employer] service: None     Active member of club or organization: None     Attends meetings of clubs or organizations: None     Relationship status: None    Intimate partner violence     Fear of current or ex partner: None     Emotionally abused: None     Physically abused: None     Forced sexual activity: None   Other Topics Concern    None   Social History Narrative    None       I have reviewed Selena's allergies, medications, problem list, medical, social and family history and have updated as needed in the electronic medical record    Review of Systems   Constitutional: Positive for activity change. Negative for appetite change, chills, diaphoresis, fatigue, fever and unexpected weight change. HENT: Positive for postnasal drip. Negative for congestion, dental problem, drooling, ear discharge, ear pain, facial swelling, hearing loss, mouth sores, nosebleeds, rhinorrhea, sinus pressure, sinus pain, sneezing, sore throat, tinnitus, trouble swallowing and voice change. Respiratory: Positive for cough. Negative for shortness of breath and wheezing. Cardiovascular: Negative for chest pain, palpitations and leg swelling. Gastrointestinal: Negative for abdominal pain, diarrhea and nausea. Endocrine: Positive for cold intolerance. Negative for heat intolerance. Skin: Negative for color change, pallor, rash and wound. Allergic/Immunologic: Positive for environmental allergies. Neurological: Negative for dizziness and headaches. Psychiatric/Behavioral: Negative for agitation, behavioral problems, confusion, decreased concentration, dysphoric mood, hallucinations, self-injury, sleep disturbance and suicidal ideas. The patient is not nervous/anxious and is not hyperactive. OBJECTIVE:     VS:  Wt Readings from Last 3 Encounters:   12/09/20 191 lb 3.2 oz (86.7 kg)   09/09/20 182 lb (82.6 kg)   06/08/20 179 lb (81.2 kg)                       There were no vitals filed for this visit.     General: Alert and oriented to person, place, and time, well developed and well nourished, in no acute distress  SKIN:  intact without any rash, masses or lesions  HEAD: normocephalic, atraumatic  ENT: normal hearing, Nose without deformity     Neck:  without mass, trachea midline, no thyromegaly or nodules  Pulmonary/Chest: respirations even and unlabored, no respiratory distress  Musculoskeletal: Normal ROM, no joint swelling, deformity or tenderness   Neurologic:  coordination and speech normal  Extremities: no clubbing, cyanosis, or edema. Psychiatric: Good eye contact, normal mood and affect, answers questions appropriately    ASSESSMENT/PLAN   Zara Dowling was seen today for depression and health maintenance. Diagnoses and all orders for this visit:    Major depressive disorder with single episode, in full remission ([facility] [address] well controlled  Continue Effexor  mg daily  Call if any worsening symptoms or suicidal/homicidal ideations    Mixed hyperlipidemia controlled  -Discussed low fat diet, limit fast food, goodies, breads and pastas if consuming several days a week,  limit any alcohol consumption.  -Discussed weight reduction and exercise 30 minutes 5 days a week for total of 150 minutes weekly.  -Discussed if any unusual muscle aching/pain to contact the office, discussed medication and risk of muscle pain/damage from Rhabdomyolysis. Postoperative hypothyroidism stable  Continue levothyroxine 75 mcg daily  Discussed taking same time every day on empty stomach, no vitamins, minerals, calcium or iron for 4 hours of  thyroid medication. Wait 30 - 60 minutes to have breakfast or other medications. Discussed colonoscopy and she would rather wait until Covid is improving, agrees to FIT test will give to patient next visit.            Return in about 3 months (around 4/19/2021) for DM, HTN, GERD, Fit test.     Discussed smoking cessation, Discussed weight loss, Discussed exercising 30 minutes daily and Discussed taking medications as directed and adverse effects    Heron Roberts is a 77 y.o. female being evaluated by a Virtual Visit (video visit) encounter to address concerns as mentioned above. A caregiver was present when appropriate. Due to this being a TeleHealth encounter (During PLKXB-82 public health emergency), evaluation of the following organ systems was limited: Vitals/Constitutional/EENT/Resp/CV/GI//MS/Neuro/Skin/Heme-Lymph-Imm. Pursuant to the emergency declaration under the [address] [place] [address] and the [employer] Act, this Virtual Visit was conducted with patient's (and/or legal guardian's) consent, to reduce the patient's risk of exposure to COVID-19 and provide necessary medical care. The patient (and/or legal guardian) has also been advised to contact this office for worsening conditions or problems, and seek emergency medical treatment and/or call 911 if deemed necessary. Services were provided through a video synchronous discussion virtually to substitute for in-person clinic visit. Patient and provider were located at their individual homes. --COLE Lauren CNP on 1/19/2021 at 8:28 AM    An electronic signature was used to authenticate this note. I have reviewed my findings and recommendations with Reta Mobley.     EMMA LaurenC, FNP-BC

## 2021-01-19 NOTE — PATIENT INSTRUCTIONS
· Be patient, and be kind to yourself. Remember that depression is not your fault and is not something you can overcome with willpower alone. Treatment is important for depression, just like for any other illness. Feeling better takes time, and your mood will improve little by little. Stay active  · Stay busy and get outside. Take a walk, or try some other light exercise. · Talk with your doctor about an exercise program. Exercise can help with mild depression. · Go to a movie or concert. Take part in a Faith activity or other social gathering. Go to a [employer] game. · Ask a friend to have dinner with you. Take care of yourself  · Eat a balanced diet with plenty of fresh fruits and vegetables, whole grains, and lean protein. If you have lost your appetite, eat small snacks rather than large meals. · Avoid using illegal drugs or marijuana and drinking alcohol. Do not take medicines that have not been prescribed for you. They may interfere with medicines you may be taking for depression, or they may make your depression worse. · Take your medicines exactly as they are prescribed. You may start to feel better within 1 to 3 weeks of taking antidepressant medicine. But it can take as many as 6 to 8 weeks to see more improvement. If you have questions or concerns about your medicines, or if you do not notice any improvement by 3 weeks, talk to your doctor. · Continue to take your medicine after your symptoms improve. Taking your medicine for at least 6 months after you feel better can help keep you from getting depressed again. If this isn't the first time you have been depressed, your doctor may recommend you to take medicine even longer. ? Talks or writes about death, including writing suicide notes or talking about guns, knives, or pills. ? Starts to spend a lot of time alone. ? Acts very aggressively or suddenly appears calm. Watch closely for changes in your health, and be sure to contact your doctor if:    · You do not get better as expected. Where can you learn more? Go to [email or web address] and sign in to your Yoyo account. Enter Z196 in the [facility] box to learn more about \"Recovering From Depression: Care Instructions. \"     If you do not have an account, please click on the \"Sign Up Now\" link. Current as of: January 31, 2020               Content Version: 12.6  © 2006-2020 [employer]. Care instructions adapted under license by [facility] ([facility]). If you have questions about a medical condition or this instruction, always ask your healthcare professional. [place] [address] any warranty or liability for your use of this information. Patient Education        Depression and Chronic Disease: Care Instructions  Your Care Instructions     A chronic disease is one that you have for a long time. Some chronic diseases can be controlled, but they usually cannot be cured. Depression is common in people with chronic diseases, but it often goes unnoticed. Many people have concerns about seeking treatment for a mental health problem. You may think it's a sign of weakness, or you don't want people to know about it. It's important to overcome these reasons for not seeking treatment. Treating depression or anxiety is good for your health. Follow-up care is a key part of your treatment and safety. Be sure to make and go to all appointments, and call your doctor if you are having problems. It's also a good idea to know your test results and keep a list of the medicines you take. How can you care for yourself at home?   Watch for symptoms of depression The symptoms of depression are often subtle at first. You may think they are caused by your disease rather than depression. Or you may think it is normal to be depressed when you have a chronic disease. If you are depressed you may:  · Feel sad or hopeless. · Feel guilty or worthless. · Not enjoy the things you used to enjoy. · Feel hopeless, as though life is not worth living. · Have trouble thinking or remembering. · Have low energy, and you may not eat or sleep well. · Pull away from others. · Think often about death or killing yourself. (Keep the numbers for these national suicide hotlines: 7-958-031-TALK [1-837.596.1523] and 5-043-BHEPSAW [1-240.388.4952]. )  Get treatment  By treating your depression, you can feel more hopeful and have more energy. If you feel better, you may take better care of yourself, so your health may improve. · Talk to your doctor if you have any changes in mood during treatment for your disease. · Ask your doctor for help. Counseling, antidepressant medicine, or a combination of the two can help most people with depression. Often a combination works best. Counseling can also help you cope with having a chronic disease. When should you call for help? Call 911 anytime you think you may need emergency care. For example, call if:    · You feel like hurting yourself or someone else.     · Someone you know has depression and is about to attempt or is attempting suicide. Call your doctor now or seek immediate medical care if:    · You hear voices.     · Someone you know has depression and:  ? Starts to give away his or her possessions. ? Uses illegal drugs or drinks alcohol heavily. ? Talks or writes about death, including writing suicide notes or talking about guns, knives, or pills. ? Starts to spend a lot of time alone. ? Acts very aggressively or suddenly appears calm.    Watch closely for changes in your health, and be sure to contact your doctor if:   · You do not get better as expected. Where can you learn more? Go to [email or web address] and sign in to your ZendyPlace account. Enter N839 in the [employer] box to learn more about \"Depression and Chronic Disease: Care Instructions. \"     If you do not have an account, please click on the \"Sign Up Now\" link. Current as of: January 31, 2020               Content Version: 12.6  © 2006-2020 [facility], Incorporated. Care instructions adapted under license by [facility] ([facility]). If you have questions about a medical condition or this instruction, always ask your healthcare professional. [address] any warranty or liability for your use of this information.

## 2021-02-21 ENCOUNTER — IMMUNIZATION (OUTPATIENT)
Dept: PRIMARY CARE CLINIC | Age: 66
End: 2021-02-21
Payer: MEDICARE

## 2021-02-21 PROCEDURE — 0001A COVID-19, PFIZER VACCINE 30MCG/0.3ML DOSE: CPT | Performed by: NURSE PRACTITIONER

## 2021-02-21 PROCEDURE — 91300 COVID-19, PFIZER VACCINE 30MCG/0.3ML DOSE: CPT | Performed by: NURSE PRACTITIONER

## 2021-03-16 ENCOUNTER — IMMUNIZATION (OUTPATIENT)
Dept: PRIMARY CARE CLINIC | Age: 66
End: 2021-03-16
Payer: MEDICARE

## 2021-03-16 PROCEDURE — 0002A COVID-19, PFIZER VACCINE 30MCG/0.3ML DOSE: CPT | Performed by: NURSE PRACTITIONER

## 2021-03-16 PROCEDURE — 91300 COVID-19, PFIZER VACCINE 30MCG/0.3ML DOSE: CPT | Performed by: NURSE PRACTITIONER

## 2021-03-22 LAB
FECAL BLOOD IMMUNOCHEMICAL TEST: NEGATIVE
FECAL BLOOD IMMUNOCHEMICAL TEST: NORMAL

## 2021-04-05 DIAGNOSIS — E11.9 TYPE 2 DIABETES MELLITUS WITHOUT COMPLICATION, WITHOUT LONG-TERM CURRENT USE OF INSULIN (HCC): Primary | ICD-10-CM

## 2021-04-05 RX ORDER — BLOOD-GLUCOSE METER
1 KIT MISCELLANEOUS DAILY
Qty: 1 KIT | Refills: 0 | Status: SHIPPED | OUTPATIENT
Start: 2021-04-05

## 2021-04-14 LAB — DIABETIC RETINOPATHY: NEGATIVE

## 2021-04-20 ENCOUNTER — OFFICE VISIT (OUTPATIENT)
Dept: FAMILY MEDICINE CLINIC | Age: 66
End: 2021-04-20
Payer: MEDICARE

## 2021-04-20 VITALS
SYSTOLIC BLOOD PRESSURE: 128 MMHG | RESPIRATION RATE: 18 BRPM | HEART RATE: 96 BPM | BODY MASS INDEX: 36.07 KG/M2 | WEIGHT: 196 LBS | OXYGEN SATURATION: 98 % | HEIGHT: 62 IN | TEMPERATURE: 97.6 F | DIASTOLIC BLOOD PRESSURE: 74 MMHG

## 2021-04-20 DIAGNOSIS — M25.511 CHRONIC RIGHT SHOULDER PAIN: Primary | ICD-10-CM

## 2021-04-20 DIAGNOSIS — G89.29 CHRONIC HIP PAIN, BILATERAL: ICD-10-CM

## 2021-04-20 DIAGNOSIS — M25.551 CHRONIC HIP PAIN, BILATERAL: ICD-10-CM

## 2021-04-20 DIAGNOSIS — M25.552 CHRONIC HIP PAIN, BILATERAL: ICD-10-CM

## 2021-04-20 DIAGNOSIS — G89.29 CHRONIC RIGHT SHOULDER PAIN: Primary | ICD-10-CM

## 2021-04-20 PROCEDURE — 3017F COLORECTAL CA SCREEN DOC REV: CPT | Performed by: NURSE PRACTITIONER

## 2021-04-20 PROCEDURE — 4004F PT TOBACCO SCREEN RCVD TLK: CPT | Performed by: NURSE PRACTITIONER

## 2021-04-20 PROCEDURE — G8427 DOCREV CUR MEDS BY ELIG CLIN: HCPCS | Performed by: NURSE PRACTITIONER

## 2021-04-20 PROCEDURE — 1090F PRES/ABSN URINE INCON ASSESS: CPT | Performed by: NURSE PRACTITIONER

## 2021-04-20 PROCEDURE — G8417 CALC BMI ABV UP PARAM F/U: HCPCS | Performed by: NURSE PRACTITIONER

## 2021-04-20 PROCEDURE — 4040F PNEUMOC VAC/ADMIN/RCVD: CPT | Performed by: NURSE PRACTITIONER

## 2021-04-20 PROCEDURE — 1123F ACP DISCUSS/DSCN MKR DOCD: CPT | Performed by: NURSE PRACTITIONER

## 2021-04-20 PROCEDURE — G8399 PT W/DXA RESULTS DOCUMENT: HCPCS | Performed by: NURSE PRACTITIONER

## 2021-04-20 PROCEDURE — 99213 OFFICE O/P EST LOW 20 MIN: CPT | Performed by: NURSE PRACTITIONER

## 2021-04-20 ASSESSMENT — ENCOUNTER SYMPTOMS
WHEEZING: 0
CONSTIPATION: 0
NAUSEA: 0
VOICE CHANGE: 0
COLOR CHANGE: 0
DIARRHEA: 0
SHORTNESS OF BREATH: 0
TROUBLE SWALLOWING: 0
VOMITING: 0
CHEST TIGHTNESS: 0
BACK PAIN: 1
SINUS PRESSURE: 0
FACIAL SWELLING: 0
COUGH: 0
SORE THROAT: 0
ABDOMINAL PAIN: 0
SINUS PAIN: 0
RHINORRHEA: 0

## 2021-04-20 NOTE — PROGRESS NOTES
to date: plain films, which showed bilateral sacroiliac osteoarthritis. Treatment to date: OTC analgesics, which have been not very effective. Current Outpatient Medications:     glucose monitoring kit (FREESTYLE) monitoring kit, 1 kit by Does not apply route daily, Disp: 1 kit, Rfl: 0    Lancets (ONETOUCH DELICA PLUS [id number]) MISC, TEST TWICE DAILY, Disp: 200 each, Rfl: 3    blood glucose test strips (ONETOUCH VERIO) strip, TEST TWICE DAILY AND AS  NEEDED FOR SYMPTOMS OF  IRREGULAR BLOOD GLUCOSE, Disp: 200 strip, Rfl: 3    pantoprazole (PROTONIX) 40 MG tablet, TAKE 1 TABLET BY MOUTH  DAILY, Disp: 90 tablet, Rfl: 0    ibuprofen (ADVIL;MOTRIN) 800 MG tablet, TAKE 1 TABLET BY MOUTH  EVERY 6 HOURS AS NEEDED FOR PAIN, Disp: 120 tablet, Rfl: 1    venlafaxine (EFFEXOR XR) 150 MG extended release capsule, TAKE 1 CAPSULE BY MOUTH  ONCE DAILY, Disp: 90 capsule, Rfl: 0    rosuvastatin (CRESTOR) 20 MG tablet, Take 1 tablet by mouth daily For cholesterol, Disp: 90 tablet, Rfl: 2    Liraglutide (VICTOZA) 18 MG/3ML SOPN SC injection, Inject 1.8 mg into the skin daily, Disp: 27 pen, Rfl: 3    levothyroxine (SYNTHROID) 75 MCG tablet, Take 1 tablet by mouth daily 1 hour before breakfast For thyroid, Disp: 90 tablet, Rfl: 3    hydroCHLOROthiazide (HYDRODIURIL) 25 MG tablet, TAKE 1 TABLET ONCE DAILY FOR BLOOD PRESSURE, Disp: 90 tablet, Rfl: 3    fluticasone (FLONASE) 50 MCG/ACT nasal spray, 1 spray by Each Nostril route daily, Disp: 3 Bottle, Rfl: 3    BD PEN NEEDLE SHANEL U/F 32G X 4 MM MISC, Inject 1 each into the skin daily, Disp: 100 each, Rfl: 4    Handicap Placard MISC, by NOT APPLICABLE route, Disp: , Rfl:     Blood Glucose Monitoring Suppl (TRUE METRIX METER) w/Device KIT, Test two times a day., Disp: 1 kit, Rfl: 0    blood glucose monitor kit and supplies, Test 2 times a day. Please fill with insurance approved as we will not prior auth.   DX:E11.9, Disp: 1 kit, Rfl: 0    Handicap Placard MISC, by Does not apply route Unable to ambulate more than 100 feet without difficulty Expires 1/16/2022, Disp: 1 each, Rfl: 0    aspirin 81 MG tablet, Take 81 mg by mouth daily, Disp: , Rfl:   Social History     Socioeconomic History    Marital status: Single     Spouse name: None    Number of children: None    Years of education: None    Highest education level: None   Occupational History    None   Social Needs    Financial resource strain: None    Food insecurity     Worry: None     Inability: None    Transportation needs     Medical: None     Non-medical: None   Tobacco Use    Smoking status: Current Every Day Smoker     Packs/day: 0.25     Years: 5.00     Pack years: 1.25     Types: Cigarettes     Start date: 3/20/2017     Last attempt to quit: 10/25/2017     Years since quitting: 3.4    Smokeless tobacco: Never Used    Tobacco comment: 20 years 1 pack last 2 days - also starting to use E cig to cut back on usage.     Substance and Sexual Activity    Alcohol use: No     Alcohol/week: 0.0 standard drinks    Drug use: No    Sexual activity: Yes   Lifestyle    Physical activity     Days per week: None     Minutes per session: None    Stress: None   Relationships    Social connections     Talks on phone: None     Gets together: None     Attends Roman Catholic service: None     Active member of club or organization: None     Attends meetings of clubs or organizations: None     Relationship status: None    Intimate partner violence     Fear of current or ex partner: None     Emotionally abused: None     Physically abused: None     Forced sexual activity: None   Other Topics Concern    None   Social History Narrative    None       I have reviewed Selena's allergies, medications, problem list, medical, social and family history and have updated as needed in the electronic medical record    Review of Systems   Constitutional: Negative for activity change, appetite change, chills, diaphoresis, fatigue, fever and unexpected

## 2021-05-03 ENCOUNTER — OFFICE VISIT (OUTPATIENT)
Dept: ORTHOPEDIC SURGERY | Age: 66
End: 2021-05-03
Payer: MEDICARE

## 2021-05-03 VITALS — WEIGHT: 196 LBS | HEIGHT: 62 IN | BODY MASS INDEX: 36.07 KG/M2

## 2021-05-03 DIAGNOSIS — M75.41 IMPINGEMENT SYNDROME OF RIGHT SHOULDER: ICD-10-CM

## 2021-05-03 DIAGNOSIS — G89.29 CHRONIC RIGHT SHOULDER PAIN: Primary | ICD-10-CM

## 2021-05-03 DIAGNOSIS — M25.511 CHRONIC RIGHT SHOULDER PAIN: Primary | ICD-10-CM

## 2021-05-03 PROCEDURE — 1123F ACP DISCUSS/DSCN MKR DOCD: CPT | Performed by: ORTHOPAEDIC SURGERY

## 2021-05-03 PROCEDURE — G8417 CALC BMI ABV UP PARAM F/U: HCPCS | Performed by: ORTHOPAEDIC SURGERY

## 2021-05-03 PROCEDURE — 4004F PT TOBACCO SCREEN RCVD TLK: CPT | Performed by: ORTHOPAEDIC SURGERY

## 2021-05-03 PROCEDURE — 4040F PNEUMOC VAC/ADMIN/RCVD: CPT | Performed by: ORTHOPAEDIC SURGERY

## 2021-05-03 PROCEDURE — 3017F COLORECTAL CA SCREEN DOC REV: CPT | Performed by: ORTHOPAEDIC SURGERY

## 2021-05-03 PROCEDURE — G8399 PT W/DXA RESULTS DOCUMENT: HCPCS | Performed by: ORTHOPAEDIC SURGERY

## 2021-05-03 PROCEDURE — G8427 DOCREV CUR MEDS BY ELIG CLIN: HCPCS | Performed by: ORTHOPAEDIC SURGERY

## 2021-05-03 PROCEDURE — 1090F PRES/ABSN URINE INCON ASSESS: CPT | Performed by: ORTHOPAEDIC SURGERY

## 2021-05-03 PROCEDURE — 99203 OFFICE O/P NEW LOW 30 MIN: CPT | Performed by: ORTHOPAEDIC SURGERY

## 2021-05-03 NOTE — PROGRESS NOTES
Amrita Solis is a 77 y.o. female, who presents   Chief Complaint   Patient presents with    Shoulder Pain     Right shoulder pain since a fall in 2011. She did PT after the injury initally. HPI[de-identified] Right shoulder pain is been present for years. Patient relates it to a [employer]. injury but she settled the claim some time ago. She has discomfort particularly when she is elevating her arm when she is holding things up for any length of time. She gets discomfort and some weakness in the shoulder. She has no numbness or tingling. There is also an issue with pain around the hip areas bilaterally. She would like this checked out at some point time as well. Allergies; medications; past medical, surgical, family, and social history; and problem list have been reviewed today and updated as indicated in this encounter - see below following Ortho specifics. Musculoskeletal: Skin condition gross neurovascular function is good in the upper extremities. Bony and soft tissue anatomy is grossly symmetrical right and left. The right elbow wrist and hand range of motion are good with good stability and joints and no pain. There is mild tenderness to palpation around the acromioclavicular arch in the right shoulder. Passive range of motion of the shoulder is good with some discomfort in elevation above 150 degrees. There is also some discomfort with internal or external rotation. There is slight amount of crepitus in the acromioclavicular arch area. There is some discomfort with active range of motion as well. Gross rotator cuff strength is good in all elements. Radiologic Studies: Imaging of the right shoulder and 2 views shows good subacromial space and glenohumeral space. There is little evidence of any degenerative changes with the exception of [facility] and right shoulder. There is very slight anterior acromial lipping.     ASSESSMENT:  Dylon Chauhan was seen today for shoulder pain.    Diagnoses and all orders for this visit:    Chronic right shoulder pain  -     XR SHOULDER RIGHT (MIN 2 VIEWS); Future  -     External Referral To Physical Therapy    Impingement syndrome of right shoulder  -     External Referral To Physical Therapy     Treatment alternatives were reviewed including medical and physical therapies, injections, and surgical options, expected risks benefits and likely outcome of each were discussed in detail, questions asked and answered and understood. We discussed the symptoms as well as physical findings and imaging results. This is consistent with bursitis and/or impingement. Recommended for this physical therapy for the shoulder. Plan: We will also we will schedule physical therapy for the right shoulder. We will also schedule another appointment and get hip x-rays on the intake and evaluate that problem.       Patient Active Problem List   Diagnosis    Type 2 diabetes mellitus without complication, without long-term current use of insulin ([address]    Vitamin D deficiency    Abnormal EKG    Nonrheumatic aortic valve stenosis    Mixed hyperlipidemia    Leg length difference, acquired    Chronic midline low back pain with left-sided sciatica    Postoperative hypothyroidism    Gastroesophageal reflux disease with esophagitis    FHx: breast cancer in first degree relative    Family history of BRCA gene positive    Major depressive disorder with single episode, in full remission ([address]    Essential hypertension    Screening mammogram for high-risk patient    Age-related osteoporosis without current pathological fracture     Tobacco use disorder    Chronic right shoulder pain    Chronic hip pain, bilateral       Past Medical History:   Diagnosis Date    Chronic back pain     Diabetes ([address]     Elevated red blood cell count 9/13/2019    Hypertension     Hypertension     Severe episode of recurrent major depressive disorder, without psychotic features [facility] 2/14/2018    Tinea pedis of both feet 9/13/2019       Past Surgical History:   Procedure Laterality Date    CHOLECYSTECTOMY      FOOT SURGERY      HEEL SPURS BOTH FEET    GALLBLADDER SURGERY  2006    HYSTERECTOMY  1980    TOTAL THYROIDECTOMY  2017    goiter with compressive symptoms       Current Outpatient Medications   Medication Sig Dispense Refill    glucose monitoring kit (FREESTYLE) monitoring kit 1 kit by Does not apply route daily 1 kit 0    Lancets (ONETOUCH DELICA PLUS [id number]) MISC TEST TWICE DAILY 200 each 3    blood glucose test strips (ONETOUCH VERIO) strip TEST TWICE DAILY AND AS  NEEDED FOR SYMPTOMS OF  IRREGULAR BLOOD GLUCOSE 200 strip 3    pantoprazole (PROTONIX) 40 MG tablet TAKE 1 TABLET BY MOUTH  DAILY 90 tablet 0    ibuprofen (ADVIL;MOTRIN) 800 MG tablet TAKE 1 TABLET BY MOUTH  EVERY 6 HOURS AS NEEDED FOR PAIN 120 tablet 1    venlafaxine (EFFEXOR XR) 150 MG extended release capsule TAKE 1 CAPSULE BY MOUTH  ONCE DAILY 90 capsule 0    rosuvastatin (CRESTOR) 20 MG tablet Take 1 tablet by mouth daily For cholesterol 90 tablet 2    Liraglutide (VICTOZA) 18 MG/3ML SOPN SC injection Inject 1.8 mg into the skin daily 27 pen 3    levothyroxine (SYNTHROID) 75 MCG tablet Take 1 tablet by mouth daily 1 hour before breakfast For thyroid 90 tablet 3    hydroCHLOROthiazide (HYDRODIURIL) 25 MG tablet TAKE 1 TABLET ONCE DAILY FOR BLOOD PRESSURE 90 tablet 3    fluticasone (FLONASE) 50 MCG/ACT nasal spray 1 spray by Each Nostril route daily 3 Bottle 3    BD PEN NEEDLE SHANEL U/F 32G X 4 MM MISC Inject 1 each into the skin daily 100 each 4    Handicap Placard MISC by NOT APPLICABLE route      Blood Glucose Monitoring Suppl (TRUE METRIX METER) w/Device KIT Test two times a day. 1 kit 0    blood glucose monitor kit and supplies Test 2 times a day. Please fill with insurance approved as we will not prior auth.   DX:E11.9 1 kit 0    Handicap Placard MISC by Does not apply route

## 2021-05-17 ENCOUNTER — OFFICE VISIT (OUTPATIENT)
Dept: ORTHOPEDIC SURGERY | Age: 66
End: 2021-05-17
Payer: MEDICARE

## 2021-05-17 VITALS — HEIGHT: 63 IN | BODY MASS INDEX: 33.06 KG/M2 | TEMPERATURE: 98 F | WEIGHT: 186.6 LBS

## 2021-05-17 DIAGNOSIS — M25.552 HIP PAIN, BILATERAL: Primary | ICD-10-CM

## 2021-05-17 DIAGNOSIS — G89.29 CHRONIC RIGHT SHOULDER PAIN: Primary | ICD-10-CM

## 2021-05-17 DIAGNOSIS — M25.551 HIP PAIN, BILATERAL: Primary | ICD-10-CM

## 2021-05-17 DIAGNOSIS — M75.41 IMPINGEMENT SYNDROME OF RIGHT SHOULDER: ICD-10-CM

## 2021-05-17 DIAGNOSIS — G89.29 CHRONIC BILATERAL LOW BACK PAIN WITHOUT SCIATICA: ICD-10-CM

## 2021-05-17 DIAGNOSIS — M25.511 CHRONIC RIGHT SHOULDER PAIN: Primary | ICD-10-CM

## 2021-05-17 DIAGNOSIS — M54.50 CHRONIC BILATERAL LOW BACK PAIN WITHOUT SCIATICA: ICD-10-CM

## 2021-05-17 PROCEDURE — 3017F COLORECTAL CA SCREEN DOC REV: CPT | Performed by: ORTHOPAEDIC SURGERY

## 2021-05-17 PROCEDURE — G8399 PT W/DXA RESULTS DOCUMENT: HCPCS | Performed by: ORTHOPAEDIC SURGERY

## 2021-05-17 PROCEDURE — G8417 CALC BMI ABV UP PARAM F/U: HCPCS | Performed by: ORTHOPAEDIC SURGERY

## 2021-05-17 PROCEDURE — 99213 OFFICE O/P EST LOW 20 MIN: CPT | Performed by: ORTHOPAEDIC SURGERY

## 2021-05-17 PROCEDURE — 4040F PNEUMOC VAC/ADMIN/RCVD: CPT | Performed by: ORTHOPAEDIC SURGERY

## 2021-05-17 PROCEDURE — 4004F PT TOBACCO SCREEN RCVD TLK: CPT | Performed by: ORTHOPAEDIC SURGERY

## 2021-05-17 PROCEDURE — G8427 DOCREV CUR MEDS BY ELIG CLIN: HCPCS | Performed by: ORTHOPAEDIC SURGERY

## 2021-05-17 PROCEDURE — 1090F PRES/ABSN URINE INCON ASSESS: CPT | Performed by: ORTHOPAEDIC SURGERY

## 2021-05-17 PROCEDURE — 1123F ACP DISCUSS/DSCN MKR DOCD: CPT | Performed by: ORTHOPAEDIC SURGERY

## 2021-05-17 NOTE — PROGRESS NOTES
Years: 5.00     Pack years: 1.25     Types: Cigarettes     Start date: 3/20/2017     Last attempt to quit: 10/25/2017     Years since quitting: 3.5    Smokeless tobacco: Never Used    Tobacco comment: 20 years 1 pack last 2 days - also starting to use E cig to cut back on usage. Vaping Use    Vaping Use: Never used   Substance and Sexual Activity    Alcohol use: No     Alcohol/week: 0.0 standard drinks    Drug use: No    Sexual activity: Yes   Other Topics Concern    None   Social History Narrative    None     Social Determinants of Health     Financial Resource Strain:     Difficulty of Paying Living Expenses:    Food Insecurity:     Worried About Running Out of Food in the Last Year:     Ran Out of Food in the Last Year:    Transportation Needs:     Lack of Transportation (Medical):  Lack of Transportation (Non-Medical):    Physical Activity:     Days of Exercise per Week:     Minutes of Exercise per Session:    Stress:     Feeling of Stress :    Social Connections:     Frequency of Communication with Friends and Family:     Frequency of Social Gatherings with Friends and Family:     Attends Judaism Services:     Active Member of Clubs or Organizations:     Attends Club or Organization Meetings:     Marital Status:    Intimate Partner Violence:     Fear of Current or Ex-Partner:     Emotionally Abused:     Physically Abused:     Sexually Abused:        Review of Systems  As follows except as previously noted in HPI:  Constitutional: Negative for chills, diaphoresis, fatigue, fever and unexpected weight change. Respiratory: Negative for cough, shortness of breath and wheezing. Cardiovascular: Negative for chest pain and palpitations. Neurological: Negative for dizziness, syncope, cephalgia. GI / : negative  Musculoskeletal: see HPI       Objective:   Physical Exam   Constitutional: Oriented to person, place, and time.  and appears well-developed and well-nourished. : Head: Normocephalic and atraumatic. Eyes: EOM are normal.   Neck: Neck supple. Cardiovascular: Normal rate and regular rhythm. Pulmonary/Chest: Effort normal. No stridor. No respiratory distress, no wheezes. Abdominal:  No abnormal distension. Neurological: Alert and oriented to person, place, and time. Skin: Skin is warm and dry. Psychiatric: Normal mood and affect.  Behavior is normal. Thought content normal.    DIGNA Putnam DO    5/17/21  3:13 PM

## 2021-07-20 ENCOUNTER — OFFICE VISIT (OUTPATIENT)
Dept: FAMILY MEDICINE CLINIC | Age: 66
End: 2021-07-20
Payer: MEDICARE

## 2021-07-20 VITALS
WEIGHT: 189 LBS | TEMPERATURE: 97.5 F | HEIGHT: 61 IN | RESPIRATION RATE: 18 BRPM | DIASTOLIC BLOOD PRESSURE: 78 MMHG | BODY MASS INDEX: 35.68 KG/M2 | SYSTOLIC BLOOD PRESSURE: 120 MMHG | HEART RATE: 90 BPM | OXYGEN SATURATION: 91 %

## 2021-07-20 DIAGNOSIS — Z80.0 FAMILY HISTORY OF COLON CANCER REQUIRING SCREENING COLONOSCOPY: ICD-10-CM

## 2021-07-20 DIAGNOSIS — E11.9 TYPE 2 DIABETES MELLITUS WITHOUT COMPLICATION, WITHOUT LONG-TERM CURRENT USE OF INSULIN (HCC): ICD-10-CM

## 2021-07-20 DIAGNOSIS — Z12.31 SCREENING MAMMOGRAM FOR HIGH-RISK PATIENT: ICD-10-CM

## 2021-07-20 DIAGNOSIS — E89.0 POSTOPERATIVE HYPOTHYROIDISM: ICD-10-CM

## 2021-07-20 DIAGNOSIS — Z87.891 PERSONAL HISTORY OF TOBACCO USE, PRESENTING HAZARDS TO HEALTH: ICD-10-CM

## 2021-07-20 DIAGNOSIS — Z00.00 ROUTINE GENERAL MEDICAL EXAMINATION AT A HEALTH CARE FACILITY: Primary | ICD-10-CM

## 2021-07-20 PROCEDURE — 1123F ACP DISCUSS/DSCN MKR DOCD: CPT | Performed by: NURSE PRACTITIONER

## 2021-07-20 PROCEDURE — 4040F PNEUMOC VAC/ADMIN/RCVD: CPT | Performed by: NURSE PRACTITIONER

## 2021-07-20 PROCEDURE — 99406 BEHAV CHNG SMOKING 3-10 MIN: CPT | Performed by: NURSE PRACTITIONER

## 2021-07-20 PROCEDURE — 3046F HEMOGLOBIN A1C LEVEL >9.0%: CPT | Performed by: NURSE PRACTITIONER

## 2021-07-20 PROCEDURE — G0439 PPPS, SUBSEQ VISIT: HCPCS | Performed by: NURSE PRACTITIONER

## 2021-07-20 PROCEDURE — 3017F COLORECTAL CA SCREEN DOC REV: CPT | Performed by: NURSE PRACTITIONER

## 2021-07-20 SDOH — ECONOMIC STABILITY: HOUSING INSECURITY
IN THE LAST 12 MONTHS, WAS THERE A TIME WHEN YOU DID NOT HAVE A STEADY PLACE TO SLEEP OR SLEPT IN A SHELTER (INCLUDING NOW)?: NO

## 2021-07-20 SDOH — ECONOMIC STABILITY: TRANSPORTATION INSECURITY
IN THE PAST 12 MONTHS, HAS THE LACK OF TRANSPORTATION KEPT YOU FROM MEDICAL APPOINTMENTS OR FROM GETTING MEDICATIONS?: NO

## 2021-07-20 SDOH — ECONOMIC STABILITY: INCOME INSECURITY: IN THE LAST 12 MONTHS, WAS THERE A TIME WHEN YOU WERE NOT ABLE TO PAY THE MORTGAGE OR RENT ON TIME?: NO

## 2021-07-20 SDOH — ECONOMIC STABILITY: FOOD INSECURITY: WITHIN THE PAST 12 MONTHS, THE FOOD YOU BOUGHT JUST DIDN'T LAST AND YOU DIDN'T HAVE MONEY TO GET MORE.: NEVER TRUE

## 2021-07-20 SDOH — ECONOMIC STABILITY: TRANSPORTATION INSECURITY
IN THE PAST 12 MONTHS, HAS LACK OF TRANSPORTATION KEPT YOU FROM MEETINGS, WORK, OR FROM GETTING THINGS NEEDED FOR DAILY LIVING?: NO

## 2021-07-20 SDOH — ECONOMIC STABILITY: FOOD INSECURITY: WITHIN THE PAST 12 MONTHS, YOU WORRIED THAT YOUR FOOD WOULD RUN OUT BEFORE YOU GOT MONEY TO BUY MORE.: NEVER TRUE

## 2021-07-20 ASSESSMENT — PATIENT HEALTH QUESTIONNAIRE - PHQ9
SUM OF ALL RESPONSES TO PHQ QUESTIONS 1-9: 0
1. LITTLE INTEREST OR PLEASURE IN DOING THINGS: 0
SUM OF ALL RESPONSES TO PHQ9 QUESTIONS 1 & 2: 0
SUM OF ALL RESPONSES TO PHQ QUESTIONS 1-9: 0
2. FEELING DOWN, DEPRESSED OR HOPELESS: 0
SUM OF ALL RESPONSES TO PHQ QUESTIONS 1-9: 0

## 2021-07-20 ASSESSMENT — LIFESTYLE VARIABLES
AUDIT-C TOTAL SCORE: INCOMPLETE
AUDIT TOTAL SCORE: INCOMPLETE
HOW OFTEN DO YOU HAVE A DRINK CONTAINING ALCOHOL: NEVER
HOW OFTEN DO YOU HAVE A DRINK CONTAINING ALCOHOL: NEVER
HOW OFTEN DO YOU HAVE A DRINK CONTAINING ALCOHOL: 0

## 2021-07-20 ASSESSMENT — SOCIAL DETERMINANTS OF HEALTH (SDOH): HOW HARD IS IT FOR YOU TO PAY FOR THE VERY BASICS LIKE FOOD, HOUSING, MEDICAL CARE, AND HEATING?: NOT HARD AT ALL

## 2021-07-20 NOTE — PATIENT INSTRUCTIONS
Learning About Healthy Weight  What is a healthy weight? A healthy weight is the weight at which you feel good about yourself and have energy for work and play. It's also one that lowers your risk for health problems. What can you do to stay at a healthy weight? It can be hard to stay at a healthy weight, especially when fast food, vending-machine snacks, and processed foods are so easy to find. And with your busy lifestyle, activity may be low on your list of things to do. But staying at a healthy weight may be easier than you think. Here are some dos and don'ts for staying at a healthy weight. Do eat healthy foods  The kinds of foods you eat have a big impact on both your weight and your health. Reaching and staying at a healthy weight is not about going on a diet. It's about making healthier food choices every day and changing your diet for good. Healthy eating means eating a variety of foods so that you get all the nutrients you need. Your body needs protein, carbohydrate, and fats for energy. They keep your heart beating, your brain active, and your muscles working. On most days, try to eat from each food group. This means eating a variety of:  · Whole grains, such as whole wheat breads and pastas. · Fruits and vegetables. · Dairy products, such as low-fat milk, yogurt, and cheese. · Lean proteins, such as all types of fish, chicken without the skin, and beans. Don't have too much or too little of one thing. All foods, if eaten in moderation, can be part of healthy eating. Even sweets can be okay. If your favorite foods are high in fat, salt, sugar, or calories, limit how often you eat them. Eat smaller servings, or look for healthy substitutes. Do watch what you eat  Many people eat more than their bodies need. Part of staying at a healthy weight means learning how much food you really need from day to day and not eating more than that.  Even with healthy foods, eating too much can make you stop dieting, you also may overeat to make up for what you missed. Most people who diet end up gaining back the pounds they lost--and more. Remember that healthy bodies come in lots of shapes and sizes. Everyone can get healthier by eating better and being more active. Where can you learn more? Go to https://chpepicewcarlos manuel.[employer] org and sign in to your new test company account. Enter 802 [id number] in the [employer] box to learn more about \"Learning About Healthy Weight. \"     If you do not have an account, please click on the \"Sign Up Now\" link. Current as of: March 17, 2021               Content Version: 12.9  © 2006-2021 [employer]. Care instructions adapted under license by [facility] ([facility]). If you have questions about a medical condition or this instruction, always ask your healthcare professional. [place] [address] any warranty or liability for your use of this information. Learning About Benefits From Quitting Smoking  How does quitting smoking make you healthier? If you're thinking about quitting smoking, you may have a few reasons to be smoke-free. Your health may be one of them. · When you quit smoking, you lower your risks for cancer, lung disease, heart attack, stroke, blood vessel disease, and blindness from macular degeneration. · When you're smoke-free, you get sick less often, and you heal faster. You are less likely to get colds, flu, bronchitis, and pneumonia. · As a nonsmoker, you may find that your mood is better and you are less stressed. When and how will you feel healthier? Quitting has real health benefits that start from day 1 of being smoke-free. And the longer you stay smoke-free, the healthier you get and the better you feel. The first hours  · After just 20 minutes, your blood pressure and heart rate go down. That means there's less stress on your heart and blood vessels.   · Within 12 hours, the level of carbon monoxide in your Lili Robbins - 7/20/2021  Medicare offers a range of preventive health benefits. Some of the tests and screenings are paid in full while other may be subject to a deductible, co-insurance, and/or copay. Some of these benefits include a comprehensive review of your medical history including lifestyle, illnesses that may run in your family, and various assessments and screenings as appropriate. After reviewing your medical record and screening and assessments performed today your provider may have ordered immunizations, labs, imaging, and/or referrals for you. A list of these orders (if applicable) as well as your Preventive Care list are included within your After Visit Summary for your review. Other Preventive Recommendations:    · A preventive eye exam performed by an eye specialist is recommended every 1-2 years to screen for glaucoma; cataracts, macular degeneration, and other eye disorders. · A preventive dental visit is recommended every 6 months. · Try to get at least 150 minutes of exercise per week or 10,000 steps per day on a pedometer . · Order or download the FREE \"Exercise & Physical Activity: Your Everyday Guide\" from [employer]. Call 5-269.296.6523 or search [employer] online. · You need 6797-1107 mg of calcium and 7267-2707 IU of vitamin D per day. It is possible to meet your calcium requirement with diet alone, but a vitamin D supplement is usually necessary to meet this goal.  · When exposed to the sun, use a sunscreen that protects against both UVA and UVB radiation with an SPF of 30 or greater. Reapply every 2 to 3 hours or after sweating, drying off with a towel, or swimming. · Always wear a seat belt when traveling in a car. Always wear a helmet when riding a bicycle or motorcycle.

## 2021-07-20 NOTE — PROGRESS NOTES
Home safety tips provided  Home exercises provided to promote strength and balanceMedicare Annual Wellness Visit  Name: Vincent Del Toro Date: 2021   MRN: <[id number]> Sex: Female   Age: 77 y.o. Ethnicity: Non-/Non    : 1955 Race: Roshan Brooks is here for Medicare AWV    Screenings for behavioral, psychosocial and functional/safety risks, and cognitive dysfunction are all negative except as indicated below. These results, as well as other patient data from the [address] form, are documented in Flowsheets linked to this Encounter. Allergies   Allergen Reactions    Bactrim [Sulfamethoxazole-Trimethoprim] Shortness Of Breath     N/v/aching    Atenolol      Anxiousness muscles hurt    Lipitor [Atorvastatin] Other (See Comments)     cough    Metformin And Related Diarrhea     Severe 11 - 12 times a day         Prior to Visit Medications    Medication Sig Taking?  Authorizing Provider   glucose monitoring kit (FREESTYLE) monitoring kit 1 kit by Does not apply route daily Yes COLE Preston CNP   Lancets ([address]) [address] [facility] TEST TWICE DAILY Yes COLE Santa CNP   blood glucose test strips (ONETOUCH VERIO) strip TEST TWICE DAILY AND AS  NEEDED FOR SYMPTOMS OF  IRREGULAR BLOOD GLUCOSE Yes COLE Santa CNP   pantoprazole (PROTONIX) 40 MG tablet TAKE 1 TABLET BY MOUTH  DAILY Yes COLE Santa CNP   ibuprofen (ADVIL;MOTRIN) 800 MG tablet TAKE 1 TABLET BY MOUTH  EVERY 6 HOURS AS NEEDED FOR PAIN Yes COLE Preston CNP   venlafaxine (EFFEXOR XR) 150 MG extended release capsule TAKE 1 CAPSULE BY MOUTH  ONCE DAILY Yes COLE Santa CNP   rosuvastatin (CRESTOR) 20 MG tablet Take 1 tablet by mouth daily For cholesterol Yes COLE Preston CNP   Liraglutide (VICTOZA) 18 MG/3ML SOPN SC injection Inject 1.8 mg into the skin daily Yes COLE Preston CNP   levothyroxine (SYNTHROID) 75 MCG tablet Take Care Team:  Adeline Goodpasture, APRN - CNP as PCP - General (Nurse Practitioner)  Adeline Goodpasture, APRN - CNP as PCP - [facility]  Iman Rivera MD as Consulting Physician (Cardiology)    Wt Readings from Last 3 Encounters:   07/20/21 189 lb (85.7 kg)   05/17/21 186 lb 9.6 oz (84.6 kg)   05/03/21 196 lb (88.9 kg)     Vitals:    07/20/21 1433   BP: 120/78   Pulse: 90   Resp: 18   Temp: 97.5 °F (36.4 °C)   SpO2: 91%   Weight: 189 lb (85.7 kg)   Height: 5' 1.25\" (1.556 m)     Body mass index is 35.42 kg/m². Based upon direct observation of the patient, evaluation of cognition reveals recent and remote memory intact.     General Appearance: alert and oriented to person, place and time, well developed and well- nourished, obese,  in no acute distress  Skin: warm and dry, no rash or erythema  Head: normocephalic and atraumatic  Eyes:glasses,  pupils equal, round, and reactive to light, extraocular eye movements intact, conjunctivae normal  ENT: tympanic membrane, external ear and ear canal normal bilaterally, nose without deformity, nasal mucosa and turbinates normal without polyps  Neck: supple and non-tender without mass, no thyromegaly or thyroid nodules, no cervical lymphadenopathy  Pulmonary/Chest: clear to auscultation bilaterally- no wheezes, rales or rhonchi, normal air movement, no respiratory distress  Cardiovascular: normal rate, regular rhythm, normal S1 and S2, no murmurs, rubs, clicks, or gallops, distal pulses intact, no carotid bruits  Abdomen: soft, non-tender, non-distended, normal bowel sounds, no masses or organomegaly  Breast: appear normal, no suspicious masses, no skin or nipple changes or axillary nodes  Extremities: no cyanosis, clubbing or edema  Musculoskeletal: normal range of motion, no joint swelling, deformity or tenderness  Neurologic: reflexes normal and symmetric, no cranial nerve deficit, gait, coordination and speech normal    Patient's complete Health Risk Assessment and screen  Completed    Hepatitis A vaccine  Aged Out    Hib vaccine  Aged Out    Meningococcal (ACWY) vaccine  Aged Out     Recommendations for [employer] Due: see orders and patient instructions/AVS.  . Recommended screening schedule for the next 5-10 years is provided to the patient in written form: see Patient Instructions/AVS.    Laisha Narciso was seen today for medicare awv. Diagnoses and all orders for this visit:    Routine general medical examination at a health care facility    Screening mammogram for high-risk patient  -     [facility] DIGITAL SCREEN W OR WO CAD BILATERAL; Future    Family history of colon cancer requiring screening colonoscopy  -     Amb External Referral To Gastroenterology    Personal history of tobacco use, presenting hazards to health  -     [place] TOBACCO USE CESSATION INTERMEDIATE 3-10 MINUTES [17131]                 Obesity Counseling: Assessed behavioral health risks and factors affecting choice of behavior. Suggested weight control approaches, including dietary changes behavioral modification and follow up plan. Provided educational and support documentation. Time spent (minutes): 10  Cardiovascular Disease Risk Counseling: Assessed the patient's risk to develop cardiovascular disease and reviewed main risk factors. Reviewed steps to reduce disease risk including:   · Quitting tobacco use, reducing amount smoked, or not starting the habit  · Making healthy food choices  · Being physically active and gradualy increasing activity levels   · Reduce weight and determine a healthy BMI goal  · Monitor blood pressure and treat if higher than 140/90 mmHg  · Maintain blood total cholesterol levels under 5 mmol/l or 190 mg/dl  · Maintain LDL cholesterol levels under 3.0 mmol/l or 115 mg/dl   · Control blood glucose levels  · Consider taking aspirin (75 mg daily), once blood pressure is controlled   Provided a follow up plan.   Time spent (minutes): 15  Tobacco Cessation Counseling: Patient advised about behavior change, including information about personal health harms, usage of appropriate cessation measures and benefits of cessation.   Time spent (minutes): 3

## 2021-07-21 DIAGNOSIS — E78.2 MIXED HYPERLIPIDEMIA: ICD-10-CM

## 2021-07-21 DIAGNOSIS — K21.00 GASTROESOPHAGEAL REFLUX DISEASE WITH ESOPHAGITIS: ICD-10-CM

## 2021-07-21 DIAGNOSIS — F32.5 MAJOR DEPRESSIVE DISORDER WITH SINGLE EPISODE, IN FULL REMISSION (HCC): ICD-10-CM

## 2021-07-27 RX ORDER — VENLAFAXINE HYDROCHLORIDE 150 MG/1
CAPSULE, EXTENDED RELEASE ORAL
Qty: 90 CAPSULE | Refills: 3 | Status: SHIPPED
Start: 2021-07-27 | End: 2022-06-22 | Stop reason: SDUPTHER

## 2021-07-27 RX ORDER — ROSUVASTATIN CALCIUM 20 MG/1
20 TABLET, COATED ORAL DAILY
Qty: 90 TABLET | Refills: 3 | Status: SHIPPED
Start: 2021-07-27 | End: 2022-06-22 | Stop reason: SDUPTHER

## 2021-07-27 RX ORDER — PANTOPRAZOLE SODIUM 40 MG/1
40 TABLET, DELAYED RELEASE ORAL DAILY
Qty: 90 TABLET | Refills: 3 | Status: SHIPPED
Start: 2021-07-27 | End: 2022-06-22 | Stop reason: SDUPTHER

## 2021-08-12 ENCOUNTER — HOSPITAL ENCOUNTER (OUTPATIENT)
Dept: MAMMOGRAPHY | Age: 66
Discharge: HOME | End: 2021-08-14
Payer: MEDICARE

## 2021-08-12 ENCOUNTER — TELEPHONE (OUTPATIENT)
Dept: FAMILY MEDICINE CLINIC | Age: 66
End: 2021-08-12

## 2021-08-12 DIAGNOSIS — Z12.31 SCREENING MAMMOGRAM FOR HIGH-RISK PATIENT: ICD-10-CM

## 2021-08-12 PROCEDURE — 77063 BREAST TOMOSYNTHESIS BI: CPT

## 2021-08-12 NOTE — TELEPHONE ENCOUNTER
Called pt and left VM to get labs done 1 week prior to visit with Gia, so 1 day next week would be perfect.

## 2021-08-23 ENCOUNTER — OFFICE VISIT (OUTPATIENT)
Dept: FAMILY MEDICINE CLINIC | Age: 66
End: 2021-08-23
Payer: MEDICARE

## 2021-08-23 VITALS
OXYGEN SATURATION: 92 % | HEIGHT: 62 IN | DIASTOLIC BLOOD PRESSURE: 78 MMHG | BODY MASS INDEX: 35.26 KG/M2 | RESPIRATION RATE: 18 BRPM | WEIGHT: 191.6 LBS | HEART RATE: 87 BPM | TEMPERATURE: 97.7 F | SYSTOLIC BLOOD PRESSURE: 136 MMHG

## 2021-08-23 DIAGNOSIS — D58.2 ELEVATED HEMOGLOBIN (HCC): ICD-10-CM

## 2021-08-23 DIAGNOSIS — Z91.81 AT HIGH RISK FOR FALLS: ICD-10-CM

## 2021-08-23 DIAGNOSIS — F17.200 TOBACCO USE DISORDER: ICD-10-CM

## 2021-08-23 DIAGNOSIS — I10 ESSENTIAL HYPERTENSION: ICD-10-CM

## 2021-08-23 DIAGNOSIS — E78.2 MIXED HYPERLIPIDEMIA: ICD-10-CM

## 2021-08-23 DIAGNOSIS — E11.9 TYPE 2 DIABETES MELLITUS WITHOUT COMPLICATION, WITHOUT LONG-TERM CURRENT USE OF INSULIN (HCC): Primary | ICD-10-CM

## 2021-08-23 PROCEDURE — 1090F PRES/ABSN URINE INCON ASSESS: CPT | Performed by: NURSE PRACTITIONER

## 2021-08-23 PROCEDURE — 2022F DILAT RTA XM EVC RTNOPTHY: CPT | Performed by: NURSE PRACTITIONER

## 2021-08-23 PROCEDURE — 1123F ACP DISCUSS/DSCN MKR DOCD: CPT | Performed by: NURSE PRACTITIONER

## 2021-08-23 PROCEDURE — 3017F COLORECTAL CA SCREEN DOC REV: CPT | Performed by: NURSE PRACTITIONER

## 2021-08-23 PROCEDURE — 3044F HG A1C LEVEL LT 7.0%: CPT | Performed by: NURSE PRACTITIONER

## 2021-08-23 PROCEDURE — 99214 OFFICE O/P EST MOD 30 MIN: CPT | Performed by: NURSE PRACTITIONER

## 2021-08-23 PROCEDURE — 4040F PNEUMOC VAC/ADMIN/RCVD: CPT | Performed by: NURSE PRACTITIONER

## 2021-08-23 PROCEDURE — G8399 PT W/DXA RESULTS DOCUMENT: HCPCS | Performed by: NURSE PRACTITIONER

## 2021-08-23 PROCEDURE — G8427 DOCREV CUR MEDS BY ELIG CLIN: HCPCS | Performed by: NURSE PRACTITIONER

## 2021-08-23 PROCEDURE — G8417 CALC BMI ABV UP PARAM F/U: HCPCS | Performed by: NURSE PRACTITIONER

## 2021-08-23 PROCEDURE — 4004F PT TOBACCO SCREEN RCVD TLK: CPT | Performed by: NURSE PRACTITIONER

## 2021-08-23 RX ORDER — LIRAGLUTIDE 6 MG/ML
1.8 INJECTION SUBCUTANEOUS DAILY
Qty: 27 PEN | Refills: 3 | Status: SHIPPED
Start: 2021-08-23 | End: 2022-01-31

## 2021-08-23 ASSESSMENT — ENCOUNTER SYMPTOMS
DIARRHEA: 0
NAUSEA: 0
SORE THROAT: 0
SHORTNESS OF BREATH: 0
CHEST TIGHTNESS: 0
CONSTIPATION: 0
WHEEZING: 0
COUGH: 0
TROUBLE SWALLOWING: 0
ABDOMINAL PAIN: 0
VOMITING: 0
BACK PAIN: 1
VOICE CHANGE: 0
SINUS PAIN: 0
COLOR CHANGE: 0
FACIAL SWELLING: 0
SINUS PRESSURE: 0
RHINORRHEA: 0

## 2021-08-23 NOTE — ASSESSMENT & PLAN NOTE
Borderline controlled, lifestyle modifications recommended and try to work on cutting down even more.

## 2021-08-23 NOTE — PROGRESS NOTES
OFFICE PROGRESS NOTE  [address] [place] [address] Rd  [address] [id number]  Dept: [phone]   Chief Complaint   Patient presents with    Diabetes    Discuss Labs     labs done on 08/16/21   [facility] Maintenance     patient refused shingles vaccines. ASSESSMENT/PLAN   1. Type 2 diabetes mellitus without complication, without long-term current use of insulin (HCC)  Assessment & Plan:   Well-controlled, continue current medications, continue current treatment plan, medication adherence emphasized and lifestyle modifications recommended  Orders:  -     Liraglutide (VICTOZA) 18 MG/3ML SOPN SC injection; Inject 1.8 mg into the skin daily, Disp-27 pen, R-3Normal  2. Elevated hemoglobin (HCC)  Assessment & Plan:   Borderline controlled, lifestyle modifications recommended and again discussed working on smoking cessation, discussed to donate a pint of blood. she has seen Dr Meagan Ruth and work up was negative. 3. Tobacco use disorder  Assessment & Plan:   Borderline controlled, lifestyle modifications recommended and try to work on cutting down even more. 4. Essential hypertension  Assessment & Plan:   Borderline controlled, continue current medications, continue current treatment plan, medication adherence emphasized and lifestyle modifications recommended   -Discussed taking medication and directed every day. -Discussed exercising daily 30 minutes 5 times a week for 150 minutes weekly.  -Discussed weight reduction if needed.  -Discussed low sodium diet.  -Discussed limiting caffeine consumption and tobacco cessation and the effects they have on the heart and blood pressure. 5. Mixed hyperlipidemia  Assessment & Plan:   Well-controlled, continue current medications, continue current treatment plan, medication adherence emphasized and lifestyle modifications recommended  6. At high risk for falls  Assessment & Plan:    On the basis of positive falls risk screening, orthopnea, palpitations, paroxysmal nocturnal dyspnea, syncope and tachypnea. Cardiovascular risk factors: advanced age (older than 54 for men, 72 for women), diabetes mellitus, dyslipidemia, hypertension, obesity (BMI >= 30 kg/m2), sedentary lifestyle and smoking/ tobacco exposure. Use of agents associated with hypertension: thyroid hormones. History of target organ damage: none.     Hyperlipidemia: Patient presents with hyperlipidemia. She was tested because DM, hypertension, hyperlipidemia. Her last labs 8/16/21  showed Total cholesterol of 164, HDL 51, LDL 91,  Triglycerides 111. Denies  chest pain, dyspnea, exertional chest pressure/discomfort, fatigue, feeding intolerance, lower extremity edema, palpitations, poor exercise tolerance, syncope, tachypnea and skin xanthelasma. There is a family history of hyperlipidemia.  There is not a family history of early ischemia heart disease.         Today's vital signs are as follows:  /78   Pulse 87   Temp 97.7 °F (36.5 °C)   Resp 18   Ht 5' 1.5\" (1.562 m)   Wt 191 lb 9.6 oz (86.9 kg)   LMP 03/01/1978 (Approximate)   SpO2 92%   BMI 35.62 kg/m²     Lab Results   Component Value Date    LABA1C 6.7 (H) 08/16/2021     Pain scale:0/10      Current Outpatient Medications:     Liraglutide (VICTOZA) 18 MG/3ML SOPN SC injection, Inject 1.8 mg into the skin daily, Disp: 27 pen, Rfl: 3    pantoprazole (PROTONIX) 40 MG tablet, TAKE 1 TABLET BY MOUTH  DAILY, Disp: 90 tablet, Rfl: 3    venlafaxine (EFFEXOR XR) 150 MG extended release capsule, TAKE 1 CAPSULE BY MOUTH  ONCE DAILY, Disp: 90 capsule, Rfl: 3    rosuvastatin (CRESTOR) 20 MG tablet, TAKE 1 TABLET BY MOUTH  DAILY FOR CHOLESTEROL, Disp: 90 tablet, Rfl: 3    glucose monitoring kit (FREESTYLE) monitoring kit, 1 kit by Does not apply route daily, Disp: 1 kit, Rfl: 0    Lancets (ONETOUCH DELICA PLUS [id number]) MISC, TEST TWICE DAILY, Disp: 200 each, Rfl: 3    blood glucose test strips (ONETOUCH VERIO) appetite change, chills, diaphoresis, fatigue, fever and unexpected weight change. HENT: Negative for congestion, dental problem, drooling, ear discharge, ear pain, facial swelling, hearing loss, mouth sores, nosebleeds, postnasal drip, rhinorrhea, sinus pressure, sinus pain, sneezing, sore throat, tinnitus, trouble swallowing and voice change. Eyes: Negative for visual disturbance. Respiratory: Negative for cough, chest tightness, shortness of breath and wheezing. Cardiovascular: Negative for chest pain, palpitations and leg swelling. Gastrointestinal: Negative for abdominal pain, constipation, diarrhea, nausea and vomiting. Endocrine: Negative for cold intolerance, heat intolerance, polydipsia, polyphagia and polyuria. Genitourinary: Negative for difficulty urinating, frequency and urgency. Musculoskeletal: Positive for back pain. Negative for arthralgias, gait problem, joint swelling, myalgias, neck pain and neck stiffness. Skin: Negative for color change, pallor, rash and wound. Allergic/Immunologic: Negative for environmental allergies, food allergies and immunocompromised state. Neurological: Negative for dizziness, tremors, seizures, syncope, facial asymmetry, speech difficulty, weakness, light-headedness, numbness and headaches. Hematological: Negative for adenopathy. Does not bruise/bleed easily. Psychiatric/Behavioral: Negative for agitation, behavioral problems, confusion, decreased concentration, dysphoric mood, hallucinations, self-injury, sleep disturbance and suicidal ideas. The patient is not nervous/anxious and is not hyperactive.         OBJECTIVE:     VS:  Wt Readings from Last 3 Encounters:   08/23/21 191 lb 9.6 oz (86.9 kg)   07/20/21 189 lb (85.7 kg)   05/17/21 186 lb 9.6 oz (84.6 kg)                       Vitals:    08/23/21 1003   BP: 136/78   Pulse: 87   Resp: 18   Temp: 97.7 °F (36.5 °C)   SpO2: 92%   Weight: 191 lb 9.6 oz (86.9 kg)   Height: 5' 1.5\" (1.562 m) General: Alert and oriented to person, place, and time, well developed and well nourished, in no acute distress  SKIN: Warm and dry, intact without any rash, masses or lesions. Knees still ecchymotic  HEAD: normocephalic, atraumatic  Eyes: sclera/conjunctiva clear, PERRLA, EOMI's intact  ENT: tympanic membranes, external ear and ear canal normal bilaterally, normal hearing,   Neck: supple and non-tender without mass, trachea midline, no cervical lymphadenopathy, no bruit, no thyromegaly or nodules  Cardiovascular: regular rate and regular rhythm, HARMAN grade 2/6 heard loudest at the right second intercostal space radiates across the precordium to both carotids   no  rubs, clicks, or gallop. Distal pulses intact. No edema  Pulmonary/Chest: clear to auscultation bilaterally, no wheezes, rales or rhonchi, normal air movement, no respiratory distress  Abdomen: soft, non-tender, non-distended, normal bowel sounds, no masses or hepatosplenomegaly  Musculoskeletal: Normal ROM, no joint swelling, deformity or tenderness   Neurologic:  gait, coordination and speech normal  Extremities: no clubbing, cyanosis, or edema. Feet cold to touch with sluggish capillary refill. Psychiatric: Good eye contact, normal mood and affect, answers questions appropriately    I have reviewed my findings and recommendations with Oly Mason.     Caryl Fernando, APRN - CNP, NP-C, FNP-BC

## 2021-08-23 NOTE — ASSESSMENT & PLAN NOTE
Borderline controlled, continue current medications, continue current treatment plan, medication adherence emphasized and lifestyle modifications recommended   -Discussed taking medication and directed every day. -Discussed exercising daily 30 minutes 5 times a week for 150 minutes weekly.  -Discussed weight reduction if needed.  -Discussed low sodium diet.  -Discussed limiting caffeine consumption and tobacco cessation and the effects they have on the heart and blood pressure.

## 2021-08-23 NOTE — ASSESSMENT & PLAN NOTE
Borderline controlled, lifestyle modifications recommended and again discussed working on smoking cessation, discussed to donate a pint of blood. she has seen Dr Elsie Hoblrook and work up was negative.

## 2021-11-03 DIAGNOSIS — E89.0 POSTOPERATIVE HYPOTHYROIDISM: ICD-10-CM

## 2021-11-03 DIAGNOSIS — I10 ESSENTIAL HYPERTENSION: ICD-10-CM

## 2021-11-03 RX ORDER — HYDROCHLOROTHIAZIDE 25 MG/1
TABLET ORAL
Qty: 90 TABLET | Refills: 3 | Status: SHIPPED
Start: 2021-11-03 | End: 2022-10-18 | Stop reason: SDUPTHER

## 2021-11-03 RX ORDER — LEVOTHYROXINE SODIUM 0.07 MG/1
75 TABLET ORAL DAILY
Qty: 90 TABLET | Refills: 3 | Status: SHIPPED
Start: 2021-11-03 | End: 2022-10-18 | Stop reason: SDUPTHER

## 2021-11-29 ENCOUNTER — OFFICE VISIT (OUTPATIENT)
Dept: FAMILY MEDICINE CLINIC | Age: 66
End: 2021-11-29
Payer: MEDICARE

## 2021-11-29 VITALS
TEMPERATURE: 97.5 F | HEART RATE: 93 BPM | OXYGEN SATURATION: 94 % | BODY MASS INDEX: 35.33 KG/M2 | SYSTOLIC BLOOD PRESSURE: 110 MMHG | WEIGHT: 192 LBS | HEIGHT: 62 IN | DIASTOLIC BLOOD PRESSURE: 70 MMHG

## 2021-11-29 DIAGNOSIS — M65.4 DE QUERVAIN'S TENOSYNOVITIS, LEFT: ICD-10-CM

## 2021-11-29 DIAGNOSIS — M25.532 WRIST PAIN, ACUTE, LEFT: Primary | ICD-10-CM

## 2021-11-29 PROCEDURE — G8484 FLU IMMUNIZE NO ADMIN: HCPCS | Performed by: NURSE PRACTITIONER

## 2021-11-29 PROCEDURE — 4040F PNEUMOC VAC/ADMIN/RCVD: CPT | Performed by: NURSE PRACTITIONER

## 2021-11-29 PROCEDURE — 4004F PT TOBACCO SCREEN RCVD TLK: CPT | Performed by: NURSE PRACTITIONER

## 2021-11-29 PROCEDURE — G8427 DOCREV CUR MEDS BY ELIG CLIN: HCPCS | Performed by: NURSE PRACTITIONER

## 2021-11-29 PROCEDURE — 1123F ACP DISCUSS/DSCN MKR DOCD: CPT | Performed by: NURSE PRACTITIONER

## 2021-11-29 PROCEDURE — G8417 CALC BMI ABV UP PARAM F/U: HCPCS | Performed by: NURSE PRACTITIONER

## 2021-11-29 PROCEDURE — 3017F COLORECTAL CA SCREEN DOC REV: CPT | Performed by: NURSE PRACTITIONER

## 2021-11-29 PROCEDURE — 99213 OFFICE O/P EST LOW 20 MIN: CPT | Performed by: NURSE PRACTITIONER

## 2021-11-29 PROCEDURE — G8399 PT W/DXA RESULTS DOCUMENT: HCPCS | Performed by: NURSE PRACTITIONER

## 2021-11-29 PROCEDURE — 1090F PRES/ABSN URINE INCON ASSESS: CPT | Performed by: NURSE PRACTITIONER

## 2021-11-29 ASSESSMENT — ENCOUNTER SYMPTOMS
SHORTNESS OF BREATH: 0
COLOR CHANGE: 0
WHEEZING: 0
BACK PAIN: 0
COUGH: 0

## 2021-11-29 NOTE — ASSESSMENT & PLAN NOTE
Uncontrolled, continue current medications, continue current treatment plan, lifestyle modifications recommended and wait 2 more weeks then start the exercises as directed if not improving by 6 weeks will send to Ortho

## 2021-11-29 NOTE — ASSESSMENT & PLAN NOTE
New Uncontrolled, continue current medications, continue current treatment plan, lifestyle modifications recommended and wait 2 more weeks then start the exercises as directed if not improving by 6 weeks will send to Ortho

## 2021-11-29 NOTE — PATIENT INSTRUCTIONS
palm down, and step on the other end with the foot opposite the hand holding the band. 3. Slowly bend your wrist sideways and away from your knee. 4. Repeat 8 to 12 times. Follow-up care is a key part of your treatment and safety. Be sure to make and go to all appointments, and call your doctor if you are having problems. It's also a good idea to know your test results and keep a list of the medicines you take. Where can you learn more? Go to https://[employer]Present[employer]. org and sign in to your [employer] account. Enter L177 in the [employer] box to learn more about \"De Quervain's Disease: Exercises. \"     If you do not have an account, please click on the \"Sign Up Now\" link. Current as of: July 1, 2021               Content Version: 13.0  © 2006-2021 [employer]. Care instructions adapted under license by [employer]. If you have questions about a medical condition or this instruction, always ask your healthcare professional. [place] [address] any warranty or liability for your use of this information. Patient Education        Velora Dee Tenosynovitis: Care Instructions  Your Care Instructions  Velora Dee (say \"lef-zuim-JQA\") tenosynovitis is a problem that makes the bottom of your thumb and the side of your wrist hurt. When you have de Quervain's, the ropey fiber (tendon) that helps move your thumb away from your fingers becomes swollen. You may have pain when you move your wrist or pick things up. You may hear a creaking sound when you move your wrist or thumb. Symptoms often get better in a few weeks with home care. Your doctor may want you to start some gentle stretching exercises once your symptoms are gone. Sometimes treatment with an injection or surgery is needed. Follow-up care is a key part of your treatment and safety. Be sure to make and go to all appointments, and call your doctor if you are having problems.  It's also a good idea to know your test results and keep a list of the medicines you take. How can you care for yourself at home? · Until your symptoms are better, stop the activities that caused the pain. · Avoid moving the hand and wrist that hurt. · Follow your doctor's directions for wearing a splint to keep your thumb and wrist from moving. · Try ice or heat. ? Put ice or a cold pack on your thumb and wrist for 10 to 20 minutes at a time. Put a thin cloth between the ice and your skin. ? You can use heat for 20 to 30 minutes, 2 or 3 times a day. Try using a heating pad, hot shower, or hot pack. · Ask your doctor if you can take an over-the-counter pain medicine, such as acetaminophen (Tylenol), ibuprofen (Advil, Motrin), or naproxen (Aleve). Be safe with medicines. Read and follow all instructions on the label. When should you call for help? Watch closely for changes in your health, and be sure to contact your doctor if:    · You have new or worse pain.     · You have new or worse numbness or tingling in your hand or fingers.     · Your hand feels weaker.     · You do not get better as expected. Where can you learn more? Go to https://[employer]. org and sign in to your [employer] account. Enter D451 in the [facility] box to learn more about \"De Quervain's Tenosynovitis: Care Instructions. \"     If you do not have an account, please click on the \"Sign Up Now\" link. Current as of: July 1, 2021               Content Version: 13.0  © 2741-3678 [employer]. Care instructions adapted under license by [facility] ([facility]). If you have questions about a medical condition or this instruction, always ask your healthcare professional. [place] [address] any warranty or liability for your use of this information. Patient Education        Wrist Tendinitis: Exercises  Introduction  Here are some examples of exercises for you to try.  The exercises may be suggested for a condition or for rehabilitation. Start each exercise slowly. Ease off the exercises if you start to have pain. You will be told when to start these exercises and which ones will work best for you. How to do the exercises  Wrist flexion and extension    1. Place your forearm on a table, with your hand and affected wrist extended beyond the table, palm down. 2. Bend your wrist to move your hand upward and allow your hand to close into a fist, then lower your hand and allow your fingers to relax. Hold each position for about 6 seconds. 3. Repeat 8 to 12 times. Hand flips    1. While seated, place your forearm and affected wrist on your thigh, palm down. 2. Flip your hand over so the back of your hand rests on your thigh and your palm is up. Alternate between palm up and palm down while keeping your forearm on your thigh. 3. Repeat 8 to 12 times. Wrist radial and ulnar deviation    1. Hold your affected hand out in front of you, palm down. 2. Slowly bend your wrist as far as you can from side to side. Hold each position for about 6 seconds. 3. Repeat 8 to 12 times. Wrist extensor stretch    1. Extend the arm with the affected wrist in front of you and point your fingers toward the floor. 2. With your other hand, gently bend your wrist farther until you feel a mild to moderate stretch in your forearm. 3. Hold the stretch for at least 15 to 30 seconds. 4. Repeat 2 to 4 times. 5. When you can do this stretch with ease and no pain, repeat steps 1 through 4. But this time extend your affected arm in front of you and make a fist with your palm facing down. Then bend your wrist, pointing your fist toward the floor. Wrist flexor stretch    1. Extend the arm with the affected wrist in front of you with your palm facing away from your body. 2. Bend back your wrist, pointing your hand up toward the ceiling.   3. With your other hand, gently bend your wrist farther until you feel a mild to moderate stretch in your

## 2021-11-29 NOTE — PROGRESS NOTES
OFFICE PROGRESS NOTE  [address] [place] [address] Rd  [address] [facility] [address]  Dept: [phone]   Chief Complaint   Patient presents with    Wrist Pain     Left wrist pain x 2 weeks     Health Maintenance     Got flu shot at [place], Due for Shingles. ASSESSMENT/PLAN   1. Wrist pain, acute, left  Assessment & Plan:   Uncontrolled, continue current medications, continue current treatment plan, lifestyle modifications recommended and wait 2 more weeks then start the exercises as directed if not improving by 6 weeks will send to Ortho  2. De Quervain's tenosynovitis, left  Assessment & Plan:  New Uncontrolled, continue current medications, continue current treatment plan, lifestyle modifications recommended and wait 2 more weeks then start the exercises as directed if not improving by 6 weeks will send to Ortho           Discussed smoking cessation, Discussed weight loss, Discussed exercising 30 minutes daily and Discussed taking medications as directed and adverse effects    Return for keep follow up. HPI:   Wrist Pain: Patient complaints of left wrist pain. This is evaluated as a unknown  injury. The pain began 2 weeks ago when she was getting out of bed she pushed to get off the bed and felt a little twinge of pain. It is worsening. The pain is located primarily in the radial area. She describes the symptoms as aching, throbbing and will be shooting when I move my thumb up the forearm. Symptoms improve with rest, ice, the use of a brace/sleeve, Motrin 800 mg every 8 - 12 hours. The symptoms are worse with use of injured area. The patient  does not have neck pain. The patient is active in none. Treatment to date has been ice, NSAID's, wrist splint, without significant relief.     She declines the shingles vaccine she did have the flu vaccine at [facility]      Current Outpatient Medications:     hydroCHLOROthiazide (HYDRODIURIL) 25 MG tablet, TAKE 1 TABLET ONCE DAILY FOR BLOOD PRESSURE, Disp: 90 tablet, Rfl: 3    levothyroxine (SYNTHROID) 75 MCG tablet, Take 1 tablet by mouth daily 1 hour before breakfast For thyroid, Disp: 90 tablet, Rfl: 3    Liraglutide (VICTOZA) 18 MG/3ML SOPN SC injection, Inject 1.8 mg into the skin daily, Disp: 27 pen, Rfl: 3    pantoprazole (PROTONIX) 40 MG tablet, TAKE 1 TABLET BY MOUTH  DAILY, Disp: 90 tablet, Rfl: 3    venlafaxine (EFFEXOR XR) 150 MG extended release capsule, TAKE 1 CAPSULE BY MOUTH  ONCE DAILY, Disp: 90 capsule, Rfl: 3    rosuvastatin (CRESTOR) 20 MG tablet, TAKE 1 TABLET BY MOUTH  DAILY FOR CHOLESTEROL, Disp: 90 tablet, Rfl: 3    glucose monitoring kit (FREESTYLE) monitoring kit, 1 kit by Does not apply route daily, Disp: 1 kit, Rfl: 0    Lancets (ONETOUCH DELICA PLUS [id number]) MISC, TEST TWICE DAILY, Disp: 200 each, Rfl: 3    blood glucose test strips (ONETOUCH VERIO) strip, TEST TWICE DAILY AND AS  NEEDED FOR SYMPTOMS OF  IRREGULAR BLOOD GLUCOSE, Disp: 200 strip, Rfl: 3    ibuprofen (ADVIL;MOTRIN) 800 MG tablet, TAKE 1 TABLET BY MOUTH  EVERY 6 HOURS AS NEEDED FOR PAIN, Disp: 120 tablet, Rfl: 1    BD PEN NEEDLE SHANEL U/F 32G X 4 MM MISC, Inject 1 each into the skin daily, Disp: 100 each, Rfl: 4    Handicap Placard MISC, by NOT APPLICABLE route, Disp: , Rfl:     Blood Glucose Monitoring Suppl (TRUE METRIX METER) w/Device KIT, Test two times a day., Disp: 1 kit, Rfl: 0    blood glucose monitor kit and supplies, Test 2 times a day. Please fill with insurance approved as we will not prior auth. DX:E11.9, Disp: 1 kit, Rfl: 0    Handicap Placard MISC, by Does not apply route Unable to ambulate more than 100 feet without difficulty Expires 1/16/2022, Disp: 1 each, Rfl: 0    aspirin 81 MG tablet, Take 81 mg by mouth daily, Disp: , Rfl:       Surgical History:  has a past surgical history that includes Gallbladder surgery (2006); Hysterectomy (1980); Foot surgery;  Cholecystectomy; and Total Thyroidectomy (2017). Social History:  reports that she has been smoking cigarettes. She started smoking about 4 years ago. She has a 13.00 pack-year smoking history. She has never used smokeless tobacco. She reports that she does not drink alcohol and does not use drugs. Family History: family history includes Diabetes in her father and mother; Heart Disease in her mother; High Cholesterol in her father and mother. I have reviewed Selena's allergies, medications, problem list, medical, social and family history and have updated as needed in the electronic medical record    Review of Systems   Constitutional: Negative for activity change, appetite change, chills, diaphoresis, fatigue, fever and unexpected weight change. Respiratory: Negative for cough, shortness of breath and wheezing. Cardiovascular: Negative for chest pain, palpitations and leg swelling. Musculoskeletal: Positive for arthralgias ( left wrist). Negative for back pain, gait problem, joint swelling, myalgias, neck pain and neck stiffness. Skin: Negative for color change, pallor, rash and wound.        OBJECTIVE:     VS:  Wt Readings from Last 3 Encounters:   11/29/21 192 lb (87.1 kg)   08/23/21 191 lb 9.6 oz (86.9 kg)   07/20/21 189 lb (85.7 kg)                       Vitals:    11/29/21 1255 11/29/21 1302 11/29/21 1332   BP: (!) 150/80 (!) 146/78 110/70   Site:   Left Upper Arm   Position:   Sitting   Cuff Size:   Medium Adult   Pulse: 93     Temp: 97.5 °F (36.4 °C)     TempSrc: Temporal     SpO2: 94%     Weight: 192 lb (87.1 kg)     Height: 5' 2\" (1.575 m)         General: Alert and oriented to person, place, and time, well developed and well nourished, in no acute distress  SKIN: Warm and dry, intact without any rash, masses or lesions  Neck: supple and non-tender without mass, trachea midline, no cervical lymphadenopathy, no bruit, no thyromegaly or nodules  Cardiovascular: regular rate and regular rhythm, normal S1 and S2,  no murmurs, rubs, clicks, or gallop. Distal pulses intact, no carotid bruits. No edema  Pulmonary/Chest: clear to auscultation bilaterally, no wheezes, rales or rhonchi, normal air movement, no respiratory distress  Abdomen: soft, non-tender, non-distended, normal bowel sounds, no masses or hepatosplenomegaly  Musculoskeletal: decreased ROM but painful over the base of the left thumb and wrist with warmth and slightly swollen, + Finkelstein on the left,  Tenderness on palpation of the thumb pain and wrist.   Neurologic:  gait, coordination and speech normal  Extremities: no clubbing, cyanosis, or edema. Psychiatric: Good eye contact, normal mood and affect, answers questions appropriately    I have reviewed my findings and recommendations with Shell Trujillo.     Juanita Reed, COLE - CNP, NP-C, FNP-BC

## 2021-12-01 DIAGNOSIS — M65.4 DE QUERVAIN'S TENOSYNOVITIS, LEFT: Primary | ICD-10-CM

## 2021-12-17 ENCOUNTER — OFFICE VISIT (OUTPATIENT)
Dept: ORTHOPEDIC SURGERY | Age: 66
End: 2021-12-17
Payer: MEDICARE

## 2021-12-17 VITALS — WEIGHT: 192 LBS | TEMPERATURE: 98 F | BODY MASS INDEX: 35.33 KG/M2 | HEIGHT: 62 IN

## 2021-12-17 DIAGNOSIS — M65.4 DE QUERVAIN'S TENOSYNOVITIS, LEFT: Primary | ICD-10-CM

## 2021-12-17 PROCEDURE — 4004F PT TOBACCO SCREEN RCVD TLK: CPT | Performed by: ORTHOPAEDIC SURGERY

## 2021-12-17 PROCEDURE — 4040F PNEUMOC VAC/ADMIN/RCVD: CPT | Performed by: ORTHOPAEDIC SURGERY

## 2021-12-17 PROCEDURE — 1090F PRES/ABSN URINE INCON ASSESS: CPT | Performed by: ORTHOPAEDIC SURGERY

## 2021-12-17 PROCEDURE — 20550 NJX 1 TENDON SHEATH/LIGAMENT: CPT | Performed by: ORTHOPAEDIC SURGERY

## 2021-12-17 PROCEDURE — 3017F COLORECTAL CA SCREEN DOC REV: CPT | Performed by: ORTHOPAEDIC SURGERY

## 2021-12-17 PROCEDURE — G8484 FLU IMMUNIZE NO ADMIN: HCPCS | Performed by: ORTHOPAEDIC SURGERY

## 2021-12-17 PROCEDURE — G8417 CALC BMI ABV UP PARAM F/U: HCPCS | Performed by: ORTHOPAEDIC SURGERY

## 2021-12-17 PROCEDURE — G8427 DOCREV CUR MEDS BY ELIG CLIN: HCPCS | Performed by: ORTHOPAEDIC SURGERY

## 2021-12-17 PROCEDURE — 99213 OFFICE O/P EST LOW 20 MIN: CPT | Performed by: ORTHOPAEDIC SURGERY

## 2021-12-17 PROCEDURE — G8399 PT W/DXA RESULTS DOCUMENT: HCPCS | Performed by: ORTHOPAEDIC SURGERY

## 2021-12-17 PROCEDURE — 1123F ACP DISCUSS/DSCN MKR DOCD: CPT | Performed by: ORTHOPAEDIC SURGERY

## 2021-12-17 NOTE — PROGRESS NOTES
Tony Donohue is a 74 y.o. female, who presents   Chief Complaint   Patient presents with    Wrist Pain     left wrist pain for the past four weeks thinks she injurde it lifting out of bed       HPI[de-identified] Left wrist pain is been present for some time. Deanna Lung indicates the thumb side. She is wearing a thumb spica wrist brace. This helps a little bit but she still has discomfort and it limits her activities. She does a lot of knitting and crocheting and wonders if that has anything to do with it. Allergies; medications; past medical, surgical, family, and social history; and problem list have been reviewed today and updated as indicated in this encounter - see below following Ortho specifics. Musculoskeletal: Skin condition gross neurovascular function are good in left upper extremity. Alignment of all fingers is good. Shoulder wrist and hand motion are good. The wrist is limited with pain along the radial side with any motion. There is minimal crepitus. There is no edema. The area is not hot or red. The thumb carpometacarpal joint seems basically stable. The Lina Kenny test is positive suggesting tenosynovitis of the first dorsal compartment. Radiologic Studies: None    ASSESSMENT:  Amber Valentin was seen today for wrist pain. Diagnoses and all orders for this visit:    De Quervain's tenosynovitis, left  -     IL INJECT TENDON SHEATH/LIGAMENT    Other orders  -     triamcinolone acetonide (KENALOG) injection 10 mg     Treatment alternatives were reviewed including medical and physical therapies, injections, and surgical options, expected risks benefits and likely outcome of each were discussed in detail, questions asked and answered and understood. We discussed the symptoms as well as physical findings and imaging results.   Symptoms are consistent with de Quervain's disease of the left wrist.  We discussed treatment options including continued bracing and anti-inflammatories as well as injection with corticosteroids to help calm this down. The final option would be surgical decompression of the first dorsal compartment which is overwhelmingly a definitively positive treatment. lCint Maxwell wanted to try the shot and we discussed the risks and benefits of that as well. PLAN:  injection of the first dorsal compartment left  wrist with Kenalog   (triamcinalone)         10mg and 1/2 cc 1/4 % bupivicainewas discussed with the patient. Discussion included but was not limited to potential risks and benefits. No contraindications to the procedure were noted. Understanding and agreement was indicated. The procedure was accomplished without incident using appropriate aseptic technique.   follow up as needed        Patient Active Problem List   Diagnosis    Type 2 diabetes mellitus without complication, without long-term current use of insulin ([address]    Vitamin D deficiency    Abnormal EKG    Nonrheumatic aortic valve stenosis    Mixed hyperlipidemia    Leg length difference, acquired    Chronic midline low back pain with left-sided sciatica    Postoperative hypothyroidism    Gastroesophageal reflux disease with esophagitis    FHx: breast cancer in first degree relative    Family history of BRCA gene positive    Major depressive disorder with single episode, in full remission ([address]    Essential hypertension    Screening mammogram for high-risk patient    Age-related osteoporosis without current pathological fracture     Tobacco use disorder    Chronic right shoulder pain    Chronic hip pain, bilateral    Elevated hemoglobin (HCC)    At high risk for falls    Wrist pain, acute, left    De Quervain's tenosynovitis, left       Past Medical History:   Diagnosis Date    Chronic back pain     Diabetes ([address]     Elevated red blood cell count 9/13/2019    Hypertension     Hypertension     Severe episode of recurrent major depressive disorder, without psychotic features ([address] 2/14/2018    Tinea pedis of both feet 9/13/2019       Past Surgical History:   Procedure Laterality Date    CHOLECYSTECTOMY      FOOT SURGERY      HEEL SPURS BOTH FEET    GALLBLADDER SURGERY  2006    HYSTERECTOMY  1980    TOTAL THYROIDECTOMY  2017    goiter with compressive symptoms       Current Outpatient Medications   Medication Sig Dispense Refill    hydroCHLOROthiazide (HYDRODIURIL) 25 MG tablet TAKE 1 TABLET ONCE DAILY FOR BLOOD PRESSURE 90 tablet 3    levothyroxine (SYNTHROID) 75 MCG tablet Take 1 tablet by mouth daily 1 hour before breakfast For thyroid 90 tablet 3    pantoprazole (PROTONIX) 40 MG tablet TAKE 1 TABLET BY MOUTH  DAILY 90 tablet 3    venlafaxine (EFFEXOR XR) 150 MG extended release capsule TAKE 1 CAPSULE BY MOUTH  ONCE DAILY 90 capsule 3    rosuvastatin (CRESTOR) 20 MG tablet TAKE 1 TABLET BY MOUTH  DAILY FOR CHOLESTEROL 90 tablet 3    glucose monitoring kit (FREESTYLE) monitoring kit 1 kit by Does not apply route daily 1 kit 0    Lancets (ONETOUCH DELICA PLUS [id number]) MISC TEST TWICE DAILY 200 each 3    blood glucose test strips (ONETOUCH VERIO) strip TEST TWICE DAILY AND AS  NEEDED FOR SYMPTOMS OF  IRREGULAR BLOOD GLUCOSE 200 strip 3    ibuprofen (ADVIL;MOTRIN) 800 MG tablet TAKE 1 TABLET BY MOUTH  EVERY 6 HOURS AS NEEDED FOR PAIN 120 tablet 1    BD PEN NEEDLE SHANEL U/F 32G X 4 MM MISC Inject 1 each into the skin daily 100 each 4    Handicap Placard MISC by NOT APPLICABLE route      Blood Glucose Monitoring Suppl (TRUE METRIX METER) w/Device KIT Test two times a day. 1 kit 0    blood glucose monitor kit and supplies Test 2 times a day. Please fill with insurance approved as we will not prior auth.   DX:E11.9 1 kit 0    Handicap Placard MISC by Does not apply route Unable to ambulate more than 100 feet without difficulty  Expires 1/16/2022 1 each 0    aspirin 81 MG tablet Take 81 mg by mouth daily      Liraglutide (VICTOZA) 18 MG/3ML SOPN SC injection Inject 1.8 mg into the skin daily 27 pen 3 No current facility-administered medications for this visit. Allergies   Allergen Reactions    Bactrim [Sulfamethoxazole-Trimethoprim] Shortness Of Breath     N/v/aching    Atenolol      Anxiousness muscles hurt    Lipitor [Atorvastatin] Other (See Comments)     cough    Metformin And Related Diarrhea     Severe 11 - 12 times a day       Social History     Socioeconomic History    Marital status: Single     Spouse name: None    Number of children: None    Years of education: None    Highest education level: None   Occupational History    None   Tobacco Use    Smoking status: Current Every Day Smoker     Packs/day: 0.50     Years: 26.00     Pack years: 13.00     Types: Cigarettes     Start date: 3/20/2017     Last attempt to quit: 10/25/2017     Years since quittin.1    Smokeless tobacco: Never Used    Tobacco comment: 20 years 1 pack last 2 days - also starting to use E cig to cut back on usage. Vaping Use    Vaping Use: Never used   Substance and Sexual Activity    Alcohol use: No     Alcohol/week: 0.0 standard drinks    Drug use: No    Sexual activity: Yes   Other Topics Concern    None   Social History Narrative    None     Social Determinants of Health     Financial Resource Strain: Low Risk     Difficulty of Paying Living Expenses: Not hard at all   Food Insecurity: No Food Insecurity    Worried About Running Out of Food in the Last Year: Never true    Evaristo of Food in the Last Year: Never true   Transportation Needs: No Transportation Needs    Lack of Transportation (Medical): No    Lack of Transportation (Non-Medical):  No   Physical Activity:     Days of Exercise per Week: Not on file    Minutes of Exercise per Session: Not on file   Stress:     Feeling of Stress : Not on file   Social Connections:     Frequency of Communication with Friends and Family: Not on file    Frequency of Social Gatherings with Friends and Family: Not on file    Attends [employer] Services: Not on file    Active Member of Clubs or Organizations: Not on file    Attends Club or Organization Meetings: Not on file    Marital Status: Not on file   Intimate Partner Violence:     Fear of Current or Ex-Partner: Not on file    Emotionally Abused: Not on file    Physically Abused: Not on file    Sexually Abused: Not on file   Housing Stability: Unknown    Unable to Pay for Housing in the Last Year: No    Number of [place] in the Last Year: Not on file    Unstable Housing in the Last Year: No       Family History   Problem Relation Age of Onset    Heart Disease Mother     Diabetes Mother          diabetic neuropathy    High Cholesterol Mother     Diabetes Father          from diabetic neuropathy stated patient    High Cholesterol Father          Review of Systems:   As follows except as previously noted in HPI:  Constitutional: Negative for chills, diaphoresis,  fever   Respiratory: Negative for cough, shortness of breath and wheezing. Cardiovascular: Negative for chest pain and palpitations. Neurological: Negative for dizziness, syncope,   GI / : abdominal pain or cramping  Musculoskeletal: see HPI       Objective:   Physical Exam   Constitutional: Oriented to person, place, and time. and appears well-developed and well-nourished. :   Head: Normocephalic and atraumatic. Neck: Neck supple. Eyes: EOM are normal.   Pulmonary/Chest: Effort normal.  No respiratory distress, no wheezes. Neurological: Alert and oriented to person  Skin: Skin is warm and dry. Toni Gallagher DO    21  1:18 PM    All reasonable efforts have been made to minimize the risk of errors that may occur in the use of voice recognition and other electronic means of charting.

## 2022-01-10 ENCOUNTER — HOSPITAL ENCOUNTER (EMERGENCY)
Age: 67
Discharge: HOME | End: 2022-01-10
Payer: MEDICARE

## 2022-01-10 ENCOUNTER — APPOINTMENT (OUTPATIENT)
Dept: GENERAL RADIOLOGY | Age: 67
End: 2022-01-10
Payer: MEDICARE

## 2022-01-10 VITALS
TEMPERATURE: 96.8 F | BODY MASS INDEX: 35.33 KG/M2 | WEIGHT: 192 LBS | OXYGEN SATURATION: 96 % | DIASTOLIC BLOOD PRESSURE: 74 MMHG | HEART RATE: 64 BPM | HEIGHT: 62 IN | SYSTOLIC BLOOD PRESSURE: 170 MMHG | RESPIRATION RATE: 18 BRPM

## 2022-01-10 DIAGNOSIS — Z20.822 EXPOSURE TO COVID-19 VIRUS: Primary | ICD-10-CM

## 2022-01-10 DIAGNOSIS — B34.9 VIRAL ILLNESS: ICD-10-CM

## 2022-01-10 DIAGNOSIS — R05.9 COUGH: ICD-10-CM

## 2022-01-10 PROCEDURE — U0005 INFEC AGEN DETEC AMPLI PROBE: HCPCS

## 2022-01-10 PROCEDURE — U0003 INFECTIOUS AGENT DETECTION BY NUCLEIC ACID (DNA OR RNA); SEVERE ACUTE RESPIRATORY SYNDROME CORONAVIRUS 2 (SARS-COV-2) (CORONAVIRUS DISEASE [COVID-19]), AMPLIFIED PROBE TECHNIQUE, MAKING USE OF HIGH THROUGHPUT TECHNOLOGIES AS DESCRIBED BY CMS-2020-01-R: HCPCS

## 2022-01-10 PROCEDURE — 71046 X-RAY EXAM CHEST 2 VIEWS: CPT

## 2022-01-10 PROCEDURE — 99282 EMERGENCY DEPT VISIT SF MDM: CPT

## 2022-01-10 RX ORDER — MULTIVIT-MIN/IRON/FOLIC ACID/K 18-600-40
1 CAPSULE ORAL DAILY
Qty: 30 CAPSULE | Refills: 0 | Status: SHIPPED | OUTPATIENT
Start: 2022-01-10 | End: 2022-01-31

## 2022-01-10 RX ORDER — BENZONATATE 200 MG/1
200 CAPSULE ORAL 3 TIMES DAILY PRN
Qty: 21 CAPSULE | Refills: 0 | Status: SHIPPED | OUTPATIENT
Start: 2022-01-10 | End: 2022-01-17

## 2022-01-10 ASSESSMENT — PAIN DESCRIPTION - LOCATION: LOCATION: HEAD;GENERALIZED

## 2022-01-10 ASSESSMENT — PAIN DESCRIPTION - FREQUENCY: FREQUENCY: CONTINUOUS

## 2022-01-10 ASSESSMENT — PAIN DESCRIPTION - ONSET: ONSET: SUDDEN

## 2022-01-10 ASSESSMENT — PAIN SCALES - GENERAL: PAINLEVEL_OUTOF10: 8

## 2022-01-10 ASSESSMENT — PAIN DESCRIPTION - DESCRIPTORS: DESCRIPTORS: SORE;HEADACHE

## 2022-01-10 ASSESSMENT — PAIN DESCRIPTION - PROGRESSION: CLINICAL_PROGRESSION: NOT CHANGED

## 2022-01-10 ASSESSMENT — PAIN DESCRIPTION - PAIN TYPE: TYPE: ACUTE PAIN

## 2022-01-11 ENCOUNTER — CARE COORDINATION (OUTPATIENT)
Dept: CARE COORDINATION | Age: 67
End: 2022-01-11

## 2022-01-11 LAB — SARS-COV-2, PCR: DETECTED

## 2022-01-11 NOTE — CARE COORDINATION
Patient contacted regarding COVID-19 risk, exposure, diagnosis, pulse oximeter ordered at discharge and monoclonal antibody infusion follow up. Discussed COVID-19 related testing which was pending at this time. Test results were pending. Patient informed of results, if available? Yes. Ambulatory Care Manager contacted the patient by telephone to perform post discharge assessment. Call within 2 business days of discharge: Yes. Verified name and  with patient as identifiers. Provided introduction to self, and explanation of the CTN/ACM role, and reason for call due to risk factors for infection and/or exposure to COVID-19. Symptoms reviewed with patient who verbalized the following symptoms: fever, fatigue, pain or aching joints, cough, no new symptoms and no worsening symptoms. Due to no new or worsening symptoms encounter was not routed to provider for escalation. Discussed follow-up appointments. If no appointment was previously scheduled, appointment scheduling offered: Yes. [facility] follow up appointment(s):   Future Appointments   Date Time Provider Cookie Shah   2022  8:20 AM COLE Flood - CNP [facility]     Non-[facility] follow up appointment(s): n/a  -Pt reports she has not called PCP office. PCP appt 2022. Pt does not feel she needs a sooner appt. Non-face-to-face services provided:  Reviewed AVS     Advance Care Planning:   Does patient have an Advance Directive:  decision maker updated. Educated patient about risk for severe COVID-19 due to risk factors according to CDC guidelines. ACM reviewed discharge instructions, medical action plan and red flag symptoms with the patient who verbalized understanding. Discussed COVID vaccination status: Yes. Education provided on COVID-19 vaccination as appropriate. Discussed exposure protocols and quarantine with CDC Guidelines.  Patient was given an opportunity to verbalize any questions and concerns and agrees to contact ACM or health care provider for questions related to their healthcare. Reviewed and educated patient on any new and changed medications related to discharge diagnosis     Was patient discharged with a pulse oximeter? No Discussed and confirmed pulse oximeter discharge instructions and when to notify provider or seek emergency care.     -Pt reports feeling lousy. Fever is intermittent, she is taking Tylenol as needed. Pt said she is sleeping a lot. No SOB or loss of taste / smell.   -Pt reports she has not filled prescription for Tessalon yet. She said she is going to have her son in law pick it up after work today. Pt said she has been taking Vitamin C & zinc.    -Pt reports she does have a pulse ox monitor but she has not got it out to checked it. She said she will check it when she is off the phone. Reviewed with Pt that if pulse ox reading drops below 90%, she is to return to ED for evaluation. Pt said she understands.   -Today's FBS 74. Pt said she is monitoring her BS closely and continues the daily DM medication as prescribed.   -Pt reports keeping hydrated and appetite is low.   -Pt reports she lives with her daughter and son in law. Pt is quarantining from them and wearing a mask when she leaves her room. Reviewed Covid precautions, hand hygiene, disinfecting touch surfaces. Pt said she understands.   -Pt is active on MyChart and she is monitoring for the Covid test results.   -Pt reports she had the Covid vaccine x2 and the booster.   -Pt declines any further ED follow up calls. Will remove name from care team and resolve Covid episode. ACM provided contact information. No further follow-up call identified based on severity of symptoms and risk factors.

## 2022-01-13 NOTE — ED PROVIDER NOTES
[address] [facility]  Department of Emergency Medicine   ED  Encounter Note  Admit Date/RoomTime: 1/10/2022  4:05 PM  ED Room: [facility]    NAME: Cezar Tejeda  : 1955  MRN: [id number]     Chief Complaint:  Fever (with headache and sore throat. Body aches. Started yesterday. +cough. )    History of Present Illness       Selena Villanueva is a 74 y.o. old female who presents to the emergency department by private vehicle, for fever, chills, rhinorrhea, sore throat and facial pain, which began 1 day(s) prior to arrival.  Since onset the symptoms have been waxing and waning and mild in severity. The symptoms are associated with fatigue, nasal congestion and body aches. There has been no abdominal pain, decreased appetite, chest tightness or chest pain or shortness of breath. The patient has received covid vaccine    ROS   Pertinent positives and negatives are stated within HPI, all other systems reviewed and are negative. Past Medical History:  has a past medical history of Chronic back pain, Diabetes ([address] Elevated red blood cell count, Hypertension, Hypertension, Severe episode of recurrent major depressive disorder, without psychotic features ([address] and Tinea pedis of both feet. Surgical History:  has a past surgical history that includes Gallbladder surgery (); Hysterectomy (); Foot surgery; Cholecystectomy; and Total Thyroidectomy (2017). Social History:  reports that she has been smoking cigarettes. She started smoking about 4 years ago. She has a 13.00 pack-year smoking history. She has never used smokeless tobacco. She reports that she does not drink alcohol and does not use drugs. Family History: family history includes Diabetes in her father and mother; Heart Disease in her mother; High Cholesterol in her father and mother.      Allergies: Bactrim [sulfamethoxazole-trimethoprim], Atenolol, Lipitor [atorvastatin], and Metformin and related    Physical Exam   Oxygen Saturation Interpretation: Normal on room air analysis. ED Triage Vitals   BP Temp Temp Source Pulse Resp SpO2 Height Weight   01/10/22 1535 01/10/22 1535 01/10/22 1535 01/10/22 1535 01/10/22 1535 01/10/22 1535 01/10/22 1556 01/10/22 1556   (!) 170/74 96.8 °F (36 °C) Infrared 64 18 96 % 5' 2\" (1.575 m) 192 lb (87.1 kg)         · Constitutional:  Alert, development consistent with age. · Ears:  External Ears: Bilateral normal.               TM's & External Canals: normal TM's and external ear canals bilaterally. · Nose:   There is clear rhinorrhea, mucosal erythema and mucosal edema. · Sinuses: no Bilateral maxillary sinus tenderness. no Bilateral frontal sinus tenderness. · Mouth:  normal tongue and buccal mucosa. · Throat: mild erythema. Airway Patent. · Neck:  Supple. No meningeal signs. There is no  anterior cervical node tenderness. · Respiratory:   Breath sounds: Bilateral normal.  Lung sounds: normal.   · CV:  Regular rate and rhythm, normal heart sounds, without pathological murmurs, ectopy, gallops, or rubs. · GI:  Abdomen Soft, nontender, good bowel sounds. No firm or pulsatile mass. · Integument:  Normal turgor. Warm, dry, without visible rash. · Neurological:  Oriented. Motor functions intact. Lab / Imaging Results   (All laboratory and radiology results have been personally reviewed by myself)  Labs:  Results for orders placed or performed during the hospital encounter of 01/10/22   COVID-19   Result Value Ref Range    SARS-CoV-2, PCR DETECTED (A) Not Detected       Imaging: All Radiology results interpreted by Radiologist unless otherwise noted. XR CHEST (2 VW)   Final Result   No acute process.              ED Course / Medical Decision Making   Medications - No data to display     Re-examination:  1/10/22       Time: 8396  Patients condition is improving after treatment. Consults:   None    Procedures:   none    Medical Decision Making: At this time the patient is without objective evidence of an acute process requiring hospitalization or inpatient management. They have remained hemodynamically stable throughout their entire ED visit and are stable for discharge with outpatient follow-up. The plan has been discussed in detail and they are aware of the specific conditions for emergent return, as well as the importance of follow-up. Assessment     1. Exposure to COVID-19 virus    2. Viral illness    3. Cough      Plan   Discharged home. Patient condition is good    New Medications     Discharge Medication List as of 1/10/2022  5:49 PM      START taking these medications    Details   benzonatate (TESSALON) 200 MG capsule Take 1 capsule by mouth 3 times daily as needed for Cough, Disp-21 capsule, R-0Normal      zinc 50 MG CAPS Take 50 mg by mouth daily, Disp-30 capsule, R-0Normal      Ascorbic Acid (VITAMIN C) 500 MG CAPS Take 1 capsule by mouth daily, Disp-30 capsule, R-0Normal           Electronically signed by COLE Hamilton CNP   DD: 1/13/22  **This report was transcribed using voice recognition software. Every effort was made to ensure accuracy; however, inadvertent computerized transcription errors may be present.   END OF ED PROVIDER NOTE         COLE Beltran CNP  01/13/22 1435

## 2022-01-31 ENCOUNTER — OFFICE VISIT (OUTPATIENT)
Dept: FAMILY MEDICINE CLINIC | Age: 67
End: 2022-01-31
Payer: MEDICARE

## 2022-01-31 VITALS
HEIGHT: 62 IN | BODY MASS INDEX: 35.51 KG/M2 | HEART RATE: 83 BPM | RESPIRATION RATE: 16 BRPM | WEIGHT: 193 LBS | DIASTOLIC BLOOD PRESSURE: 80 MMHG | OXYGEN SATURATION: 96 % | SYSTOLIC BLOOD PRESSURE: 138 MMHG | TEMPERATURE: 97 F

## 2022-01-31 DIAGNOSIS — D58.2 ELEVATED HEMOGLOBIN (HCC): ICD-10-CM

## 2022-01-31 DIAGNOSIS — F32.5 MAJOR DEPRESSIVE DISORDER WITH SINGLE EPISODE, IN FULL REMISSION (HCC): ICD-10-CM

## 2022-01-31 DIAGNOSIS — E11.9 TYPE 2 DIABETES MELLITUS WITHOUT COMPLICATION, WITHOUT LONG-TERM CURRENT USE OF INSULIN (HCC): Primary | ICD-10-CM

## 2022-01-31 DIAGNOSIS — I10 ESSENTIAL HYPERTENSION: ICD-10-CM

## 2022-01-31 DIAGNOSIS — E78.2 MIXED HYPERLIPIDEMIA: ICD-10-CM

## 2022-01-31 DIAGNOSIS — E89.0 POSTOPERATIVE HYPOTHYROIDISM: ICD-10-CM

## 2022-01-31 LAB
CHP ED QC CHECK: NORMAL
CREATININE URINE POCT: 50
GLUCOSE BLD-MCNC: 141 MG/DL
HBA1C MFR BLD: 6.8 %
MICROALBUMIN/CREAT 24H UR: 10 MG/G{CREAT}
MICROALBUMIN/CREAT UR-RTO: <30

## 2022-01-31 PROCEDURE — 4004F PT TOBACCO SCREEN RCVD TLK: CPT | Performed by: NURSE PRACTITIONER

## 2022-01-31 PROCEDURE — 82044 UR ALBUMIN SEMIQUANTITATIVE: CPT | Performed by: NURSE PRACTITIONER

## 2022-01-31 PROCEDURE — G8399 PT W/DXA RESULTS DOCUMENT: HCPCS | Performed by: NURSE PRACTITIONER

## 2022-01-31 PROCEDURE — 1123F ACP DISCUSS/DSCN MKR DOCD: CPT | Performed by: NURSE PRACTITIONER

## 2022-01-31 PROCEDURE — G8484 FLU IMMUNIZE NO ADMIN: HCPCS | Performed by: NURSE PRACTITIONER

## 2022-01-31 PROCEDURE — G8427 DOCREV CUR MEDS BY ELIG CLIN: HCPCS | Performed by: NURSE PRACTITIONER

## 2022-01-31 PROCEDURE — G8417 CALC BMI ABV UP PARAM F/U: HCPCS | Performed by: NURSE PRACTITIONER

## 2022-01-31 PROCEDURE — 83036 HEMOGLOBIN GLYCOSYLATED A1C: CPT | Performed by: NURSE PRACTITIONER

## 2022-01-31 PROCEDURE — 2022F DILAT RTA XM EVC RTNOPTHY: CPT | Performed by: NURSE PRACTITIONER

## 2022-01-31 PROCEDURE — 1090F PRES/ABSN URINE INCON ASSESS: CPT | Performed by: NURSE PRACTITIONER

## 2022-01-31 PROCEDURE — 3044F HG A1C LEVEL LT 7.0%: CPT | Performed by: NURSE PRACTITIONER

## 2022-01-31 PROCEDURE — 99214 OFFICE O/P EST MOD 30 MIN: CPT | Performed by: NURSE PRACTITIONER

## 2022-01-31 PROCEDURE — 4040F PNEUMOC VAC/ADMIN/RCVD: CPT | Performed by: NURSE PRACTITIONER

## 2022-01-31 PROCEDURE — 82962 GLUCOSE BLOOD TEST: CPT | Performed by: NURSE PRACTITIONER

## 2022-01-31 PROCEDURE — 3017F COLORECTAL CA SCREEN DOC REV: CPT | Performed by: NURSE PRACTITIONER

## 2022-01-31 RX ORDER — LIRAGLUTIDE 6 MG/ML
1.8 INJECTION SUBCUTANEOUS DAILY
Qty: 27 PEN | Refills: 3 | Status: SHIPPED
Start: 2022-01-31 | End: 2022-10-18 | Stop reason: SDUPTHER

## 2022-01-31 ASSESSMENT — ENCOUNTER SYMPTOMS
VOICE CHANGE: 0
ABDOMINAL PAIN: 0
DIARRHEA: 0
CHEST TIGHTNESS: 0
BACK PAIN: 0
VOMITING: 0
WHEEZING: 0
FACIAL SWELLING: 0
COLOR CHANGE: 0
NAUSEA: 0
RHINORRHEA: 0
SHORTNESS OF BREATH: 0
TROUBLE SWALLOWING: 0
SORE THROAT: 0
SINUS PAIN: 0
SINUS PRESSURE: 0
CONSTIPATION: 0
COUGH: 0

## 2022-01-31 NOTE — PROGRESS NOTES
OFFICE PROGRESS NOTE  [address] [place] [address] Rd  [id number]  Dept: [phone]   Chief Complaint   Patient presents with    Diabetes    Health Maintenance     due for shingles and flu        ASSESSMENT/PLAN   1. Type 2 diabetes mellitus without complication, without long-term current use of insulin (HCC)  Assessment & Plan:   Well-controlled, continue current medications, continue current treatment plan, medication adherence emphasized and lifestyle modifications recommended  Orders:  -     POCT microalbumin  -     POCT glycosylated hemoglobin (Hb A1C)  -     POCT Glucose  -     HM DIABETES FOOT EXAM  -     CBC Auto Differential; Future  -     Comprehensive Metabolic Panel; Future  -     Lipid Panel; Future  -     Liraglutide (VICTOZA) 18 MG/3ML SOPN SC injection; Inject 1.8 mg into the skin daily, Disp-27 pen, R-3Normal  2. Essential hypertension  Assessment & Plan:   Well-controlled, continue current plan pending work up below, medication adherence emphasized and lifestyle modifications recommended  Orders:  -     CBC Auto Differential; Future  -     Comprehensive Metabolic Panel; Future  3. Mixed hyperlipidemia  Assessment & Plan:   Well-controlled, continue current medications, continue current treatment plan, medication adherence emphasized and lifestyle modifications recommended  Orders:  -     Lipid Panel; Future  4. Major depressive disorder with single episode, in full remission [facility])  Assessment & Plan:   Well-controlled, continue current medications, continue current treatment plan, medication adherence emphasized and lifestyle modifications recommended  5. Elevated hemoglobin (HCC)  Assessment & Plan:   Unclear control, lifestyle modifications recommended and repeat lab in 3 months, has cut down on smoking 3 cigarettes a day for the last 3 weeks  6. Postoperative hypothyroidism  -     TSH without Reflex;  Future  -     T4, Free; Future Reviewed labs: CMP, CBCD, Lipids, TSH, FT4, 8/16/21      Discussed reducing caffeine intake, Discussed smoking cessation, Discussed weight loss, Discussed exercising 30 minutes daily and Discussed taking medications as directed and adverse effects    Return in about 3 months (around 4/30/2022) for HTN, hyperlipidemia, DM.     HPI:     74 y.o. female presents today for follow-up of diabetes [place]. In-office bloodsugar is:   Results for POC orders placed in visit on 01/31/22   POCT glycosylated hemoglobin (Hb A1C)   Result Value Ref Range    Hemoglobin A1C 6.8 %   POCT Glucose   Result Value Ref Range    Glucose 141 mg/dL    QC OK? Patient reports being compliant to low carbohydrate diet and increasing weekly exercises. Currently, the patient is treated with medication(s): victoza 1.8 mg daily. Patient reports checking home blood sugar 1 times per day. Patient states home blood sugar ranges from 120 - 170. Patient denies hypoglycemic episodes and understand to take sweetened beverages or a snack if hypoglycemic symptoms are suspected. Hypertension: Patient here for follow-up of elevated blood pressure. She is not exercising and is adherent to low salt diet.  Blood pressure is well controlled at home. Cardiac symptoms none. Patient denies chest pain, chest pressure/discomfort, claudication, dyspnea, exertional chest pressure/discomfort, fatigue, irregular heart beat, lower extremity edema, near-syncope, orthopnea, palpitations, paroxysmal nocturnal dyspnea, syncope and tachypnea.  Cardiovascular risk factors: advanced age (older than 54 for men, 72 for women), diabetes mellitus, dyslipidemia, hypertension, obesity (BMI >= 30 kg/m2), sedentary lifestyle and smoking/ tobacco exposure. Use of agents associated with hypertension: thyroid hormones.  History of target organ damage: none.     Hyperlipidemia: Patient presents with hyperlipidemia.  She was tested because DM, hypertension, hyperlipidemia.  Her last labs 8/16/21  showed Total cholesterol of 164, HDL 51, LDL 91,  Triglycerides 111. Denies  chest pain, dyspnea, exertional chest pressure/discomfort, fatigue, feeding intolerance, lower extremity edema, palpitations, poor exercise tolerance, syncope, tachypnea and skin xanthelasma. There is a family history of hyperlipidemia. There is not a family history of early ischemia heart disease. Depression: Patient complains of depression and increasing the Effexor  mg daily. She complains of no depression symptoms at this time and negative depression screening today. depressed mood, hopelessness and insomnia have resolved. Onset was approximately several years ago, gradually worsening since that time.  She denies current suicidal and homicidal plan or intent.   Family history significant for no psychiatric illness. Possible organic causes contributing are: none.  Risk factors: positive family history in  daughter, negative life event daughter has cancer and previous episode of depression Previous treatment includes Effexor . She complains of the following side effects from the treatment: none.         Today's vital signs are as follows:  /80   Pulse 83   Temp 97 °F (36.1 °C)   Resp 16   Ht 5' 2\" (1.575 m)   Wt 193 lb (87.5 kg)   LMP 03/01/1978 (Approximate)   SpO2 96%   BMI 35.30 kg/m²     Lab Results   Component Value Date    LABA1C 6.8 01/31/2022     Pain scale:0/10          Current Outpatient Medications:     Liraglutide (VICTOZA) 18 MG/3ML SOPN SC injection, Inject 1.8 mg into the skin daily, Disp: 27 pen, Rfl: 3    hydroCHLOROthiazide (HYDRODIURIL) 25 MG tablet, TAKE 1 TABLET ONCE DAILY FOR BLOOD PRESSURE, Disp: 90 tablet, Rfl: 3    levothyroxine (SYNTHROID) 75 MCG tablet, Take 1 tablet by mouth daily 1 hour before breakfast For thyroid, Disp: 90 tablet, Rfl: 3    pantoprazole (PROTONIX) 40 MG tablet, TAKE 1 TABLET BY MOUTH  DAILY, Disp: 90 tablet, Rfl: 3   venlafaxine (EFFEXOR XR) 150 MG extended release capsule, TAKE 1 CAPSULE BY MOUTH  ONCE DAILY, Disp: 90 capsule, Rfl: 3    rosuvastatin (CRESTOR) 20 MG tablet, TAKE 1 TABLET BY MOUTH  DAILY FOR CHOLESTEROL, Disp: 90 tablet, Rfl: 3    glucose monitoring kit (FREESTYLE) monitoring kit, 1 kit by Does not apply route daily, Disp: 1 kit, Rfl: 0    Lancets (ONETOUCH DELICA PLUS FEUUDO43C) MISC, TEST TWICE DAILY, Disp: 200 each, Rfl: 3    blood glucose test strips (ONETOUCH VERIO) strip, TEST TWICE DAILY AND AS  NEEDED FOR SYMPTOMS OF  IRREGULAR BLOOD GLUCOSE, Disp: 200 strip, Rfl: 3    ibuprofen (ADVIL;MOTRIN) 800 MG tablet, TAKE 1 TABLET BY MOUTH  EVERY 6 HOURS AS NEEDED FOR PAIN, Disp: 120 tablet, Rfl: 1    BD PEN NEEDLE SHANEL U/F 32G X 4 MM MISC, Inject 1 each into the skin daily, Disp: 100 each, Rfl: 4    Handicap Placard MISC, by NOT APPLICABLE route, Disp: , Rfl:     Blood Glucose Monitoring Suppl (TRUE METRIX METER) w/Device KIT, Test two times a day., Disp: 1 kit, Rfl: 0    blood glucose monitor kit and supplies, Test 2 times a day. Please fill with insurance approved as we will not prior auth. DX:E11.9, Disp: 1 kit, Rfl: 0    Handicap Placard MISC, by Does not apply route Unable to ambulate more than 100 feet without difficulty Expires 1/16/2022, Disp: 1 each, Rfl: 0    aspirin 81 MG tablet, Take 81 mg by mouth daily, Disp: , Rfl:       Surgical History:  has a past surgical history that includes Gallbladder surgery (2006); Hysterectomy (1980); Foot surgery; Cholecystectomy; and Total Thyroidectomy (2017). Social History:  reports that she has been smoking cigarettes. She started smoking about 4 years ago. She has a 13.00 pack-year smoking history. She has never used smokeless tobacco. She reports that she does not drink alcohol and does not use drugs. Family History: family history includes Diabetes in her father and mother; Heart Disease in her mother; High Cholesterol in her father and mother.   I have reviewed Selena's allergies, medications, problem list, medical, social and family history and have updated as needed in the electronic medical record    Review of Systems   Constitutional: Negative for activity change, appetite change, chills, diaphoresis, fatigue, fever and unexpected weight change. HENT: Negative for congestion, dental problem, drooling, ear discharge, ear pain, facial swelling, hearing loss, mouth sores, nosebleeds, postnasal drip, rhinorrhea, sinus pressure, sinus pain, sneezing, sore throat, tinnitus, trouble swallowing and voice change. Eyes: Negative for visual disturbance. Respiratory: Negative for cough, chest tightness, shortness of breath and wheezing. Cardiovascular: Negative for chest pain, palpitations and leg swelling. Gastrointestinal: Negative for abdominal pain, constipation, diarrhea, nausea and vomiting. Endocrine: Negative for cold intolerance, heat intolerance, polydipsia, polyphagia and polyuria. Genitourinary: Negative for difficulty urinating, frequency and urgency. Musculoskeletal: Negative for arthralgias, back pain, gait problem, joint swelling, myalgias, neck pain and neck stiffness. Skin: Negative for color change, pallor, rash and wound. Allergic/Immunologic: Negative for environmental allergies, food allergies and immunocompromised state. Neurological: Negative for dizziness, tremors, seizures, syncope, facial asymmetry, speech difficulty, weakness, light-headedness, numbness and headaches. Hematological: Negative for adenopathy. Does not bruise/bleed easily. Psychiatric/Behavioral: Negative for agitation, behavioral problems, confusion, decreased concentration, dysphoric mood, hallucinations, self-injury, sleep disturbance and suicidal ideas. The patient is not nervous/anxious and is not hyperactive.         OBJECTIVE:     VS:  Wt Readings from Last 3 Encounters:   01/31/22 193 lb (87.5 kg)   01/10/22 192 lb (87.1 kg)   12/17/21 192 lb (87.1 kg)                       Vitals:    01/31/22 0905 01/31/22 0911   BP: (!) 160/70 138/80   Pulse: 83    Resp: 16    Temp: 97 °F (36.1 °C)    SpO2: 96%    Weight: 193 lb (87.5 kg)    Height: 5' 2\" (1.575 m)        General: Alert and oriented to person, place, and time, well developed and well nourished, obese,  in no acute distress  SKIN: Warm and dry, intact without any rash, masses or lesions  HEAD: normocephalic, atraumatic  Eyes: sclera/conjunctiva clear, PERRLA, EOMI's intact  ENT: tympanic membranes, external ear and ear canal normal bilaterally, normal hearing, Nose without deformity, nasal mucosa and turbinates normal without polyps   Throat: clear, tongue midline, tonsils 1+, drainage, no masses or lesions noted, good dentition  Neck: supple and non-tender without mass, trachea midline, no cervical lymphadenopathy, no bruit, no thyromegaly or nodules  Cardiovascular: regular rate and regular rhythm, normal S1 and S2,  no murmurs, rubs, clicks, or gallop. Distal pulses intact, no carotid bruits. No edema  Pulmonary/Chest: clear to auscultation bilaterally, no wheezes, rales or rhonchi, normal air movement, no respiratory distress  Abdomen: soft, non-tender, non-distended, normal bowel sounds, no masses or hepatosplenomegaly  Musculoskeletal: Normal ROM, no joint swelling, deformity or tenderness   Neurologic: reflexes normal and symmetric, no cranial nerve deficit, gait, coordination and speech normal  Extremities: no clubbing, cyanosis, or edema. Psychiatric: Good eye contact, normal mood and affect, answers questions appropriately  Foot Exam:  No signs of infection and/or necrosis noted. Sensation intact and equal bilaterally. Monofilament normal bilateral,  Interdigit spaces exhibit no abnormal skin changes and/or surface growth. I have reviewed my findings and recommendations with Max Ulloa.     Martha Hernandez, APRN - CNP, NP-C, FNP-BC

## 2022-01-31 NOTE — ASSESSMENT & PLAN NOTE
Unclear control, lifestyle modifications recommended and repeat lab in 3 months, has cut down on smoking 3 cigarettes a day for the last 3 weeks

## 2022-03-28 RX ORDER — IBUPROFEN 800 MG/1
800 TABLET ORAL EVERY 6 HOURS PRN
Qty: 120 TABLET | Refills: 1 | Status: SHIPPED
Start: 2022-03-28 | End: 2022-10-18 | Stop reason: SDUPTHER

## 2022-04-18 DIAGNOSIS — E11.9 TYPE 2 DIABETES MELLITUS WITHOUT COMPLICATION, WITHOUT LONG-TERM CURRENT USE OF INSULIN (HCC): ICD-10-CM

## 2022-04-19 LAB — DIABETIC RETINOPATHY: NEGATIVE

## 2022-04-19 RX ORDER — LANCETS 30 GAUGE
EACH MISCELLANEOUS
Qty: 200 EACH | Refills: 3 | Status: SHIPPED
Start: 2022-04-19 | End: 2022-10-18 | Stop reason: SDUPTHER

## 2022-04-19 RX ORDER — BLOOD SUGAR DIAGNOSTIC
STRIP MISCELLANEOUS
Qty: 200 STRIP | Refills: 3 | Status: SHIPPED
Start: 2022-04-19 | End: 2022-10-18 | Stop reason: SDUPTHER

## 2022-04-29 DIAGNOSIS — R11.2 NAUSEA AND VOMITING IN ADULT: Primary | ICD-10-CM

## 2022-04-29 RX ORDER — ONDANSETRON 4 MG/1
4 TABLET, FILM COATED ORAL 3 TIMES DAILY PRN
Qty: 30 TABLET | Refills: 0 | Status: SHIPPED
Start: 2022-04-29 | End: 2022-10-18 | Stop reason: SDUPTHER

## 2022-06-04 DIAGNOSIS — E11.9 TYPE 2 DIABETES MELLITUS WITHOUT COMPLICATION, WITHOUT LONG-TERM CURRENT USE OF INSULIN (HCC): ICD-10-CM

## 2022-06-07 RX ORDER — PEN NEEDLE, DIABETIC 32GX 5/32"
1 NEEDLE, DISPOSABLE MISCELLANEOUS DAILY
Qty: 100 EACH | Refills: 4 | Status: SHIPPED
Start: 2022-06-07 | End: 2022-10-18 | Stop reason: SDUPTHER

## 2022-06-20 DIAGNOSIS — E89.0 POSTOPERATIVE HYPOTHYROIDISM: ICD-10-CM

## 2022-06-20 DIAGNOSIS — E11.9 TYPE 2 DIABETES MELLITUS WITHOUT COMPLICATION, WITHOUT LONG-TERM CURRENT USE OF INSULIN (HCC): ICD-10-CM

## 2022-06-20 DIAGNOSIS — I10 ESSENTIAL HYPERTENSION: ICD-10-CM

## 2022-06-20 DIAGNOSIS — E78.2 MIXED HYPERLIPIDEMIA: ICD-10-CM

## 2022-06-20 LAB
ALBUMIN SERPL-MCNC: 4.1 G/DL (ref 3.5–5.2)
ALP BLD-CCNC: 73 U/L (ref 35–104)
ALT SERPL-CCNC: 13 U/L (ref 0–32)
ANION GAP SERPL CALCULATED.3IONS-SCNC: 15 MMOL/L (ref 7–16)
AST SERPL-CCNC: 18 U/L (ref 0–31)
BASOPHILS ABSOLUTE: 0.06 E9/L (ref 0–0.2)
BASOPHILS RELATIVE PERCENT: 0.7 % (ref 0–2)
BILIRUB SERPL-MCNC: 0.6 MG/DL (ref 0–1.2)
BUN BLDV-MCNC: 12 MG/DL (ref 6–23)
CALCIUM SERPL-MCNC: 9.3 MG/DL (ref 8.6–10.2)
CHLORIDE BLD-SCNC: 98 MMOL/L (ref 98–107)
CHOLESTEROL, TOTAL: 164 MG/DL (ref 0–199)
CO2: 28 MMOL/L (ref 22–29)
CREAT SERPL-MCNC: 0.9 MG/DL (ref 0.5–1)
EOSINOPHILS ABSOLUTE: 0.19 E9/L (ref 0.05–0.5)
EOSINOPHILS RELATIVE PERCENT: 2.1 % (ref 0–6)
GFR AFRICAN AMERICAN: >60
GFR NON-AFRICAN AMERICAN: >60 ML/MIN/1.73
GLUCOSE BLD-MCNC: 145 MG/DL (ref 74–99)
HCT VFR BLD CALC: 50.8 % (ref 34–48)
HDLC SERPL-MCNC: 47 MG/DL
HEMOGLOBIN: 16.4 G/DL (ref 11.5–15.5)
IMMATURE GRANULOCYTES #: 0.02 E9/L
IMMATURE GRANULOCYTES %: 0.2 % (ref 0–5)
LDL CHOLESTEROL CALCULATED: 77 MG/DL (ref 0–99)
LYMPHOCYTES ABSOLUTE: 1.71 E9/L (ref 1.5–4)
LYMPHOCYTES RELATIVE PERCENT: 19 % (ref 20–42)
MCH RBC QN AUTO: 29.4 PG (ref 26–35)
MCHC RBC AUTO-ENTMCNC: 32.3 % (ref 32–34.5)
MCV RBC AUTO: 91.2 FL (ref 80–99.9)
MONOCYTES ABSOLUTE: 0.66 E9/L (ref 0.1–0.95)
MONOCYTES RELATIVE PERCENT: 7.3 % (ref 2–12)
NEUTROPHILS ABSOLUTE: 6.36 E9/L (ref 1.8–7.3)
NEUTROPHILS RELATIVE PERCENT: 70.7 % (ref 43–80)
PDW BLD-RTO: 15.1 FL (ref 11.5–15)
PLATELET # BLD: 289 E9/L (ref 130–450)
PMV BLD AUTO: 9.9 FL (ref 7–12)
POTASSIUM SERPL-SCNC: 4.1 MMOL/L (ref 3.5–5)
RBC # BLD: 5.57 E12/L (ref 3.5–5.5)
SODIUM BLD-SCNC: 141 MMOL/L (ref 132–146)
T4 FREE: 1.19 NG/DL (ref 0.93–1.7)
TOTAL PROTEIN: 7 G/DL (ref 6.4–8.3)
TRIGL SERPL-MCNC: 198 MG/DL (ref 0–149)
TSH SERPL DL<=0.05 MIU/L-ACNC: 4.11 UIU/ML (ref 0.27–4.2)
VLDLC SERPL CALC-MCNC: 40 MG/DL
WBC # BLD: 9 E9/L (ref 4.5–11.5)

## 2022-06-22 ENCOUNTER — OFFICE VISIT (OUTPATIENT)
Dept: FAMILY MEDICINE CLINIC | Age: 67
End: 2022-06-22
Payer: MEDICARE

## 2022-06-22 VITALS
SYSTOLIC BLOOD PRESSURE: 108 MMHG | WEIGHT: 196.8 LBS | OXYGEN SATURATION: 93 % | BODY MASS INDEX: 34.87 KG/M2 | RESPIRATION RATE: 16 BRPM | TEMPERATURE: 97.2 F | HEART RATE: 90 BPM | DIASTOLIC BLOOD PRESSURE: 62 MMHG | HEIGHT: 63 IN

## 2022-06-22 DIAGNOSIS — G89.29 CHRONIC PAIN OF MULTIPLE JOINTS: ICD-10-CM

## 2022-06-22 DIAGNOSIS — E78.2 MIXED HYPERLIPIDEMIA: ICD-10-CM

## 2022-06-22 DIAGNOSIS — I10 ESSENTIAL HYPERTENSION: ICD-10-CM

## 2022-06-22 DIAGNOSIS — M25.50 CHRONIC PAIN OF MULTIPLE JOINTS: ICD-10-CM

## 2022-06-22 DIAGNOSIS — M15.8 DEGENERATIVE ARTHRITIS OF INTERPHALANGEAL JOINT OF LEFT THUMB: ICD-10-CM

## 2022-06-22 DIAGNOSIS — M25.552 CHRONIC HIP PAIN, BILATERAL: ICD-10-CM

## 2022-06-22 DIAGNOSIS — K21.9 GASTROESOPHAGEAL REFLUX DISEASE WITHOUT ESOPHAGITIS: ICD-10-CM

## 2022-06-22 DIAGNOSIS — G89.29 CHRONIC HIP PAIN, BILATERAL: ICD-10-CM

## 2022-06-22 DIAGNOSIS — E66.01 SEVERE OBESITY (BMI 35.0-39.9) WITH COMORBIDITY (HCC): ICD-10-CM

## 2022-06-22 DIAGNOSIS — F32.5 MAJOR DEPRESSIVE DISORDER WITH SINGLE EPISODE, IN FULL REMISSION (HCC): ICD-10-CM

## 2022-06-22 DIAGNOSIS — E11.9 TYPE 2 DIABETES MELLITUS WITHOUT COMPLICATION, WITHOUT LONG-TERM CURRENT USE OF INSULIN (HCC): Primary | ICD-10-CM

## 2022-06-22 DIAGNOSIS — M25.551 CHRONIC HIP PAIN, BILATERAL: ICD-10-CM

## 2022-06-22 DIAGNOSIS — K21.00 GASTROESOPHAGEAL REFLUX DISEASE WITH ESOPHAGITIS WITHOUT HEMORRHAGE: ICD-10-CM

## 2022-06-22 PROBLEM — M18.11 OSTEOARTHRITIS OF CARPOMETACARPAL (CMC) JOINT OF RIGHT THUMB: Status: ACTIVE | Noted: 2022-06-22

## 2022-06-22 LAB
CHP ED QC CHECK: ABNORMAL
GLUCOSE BLD-MCNC: 158 MG/DL
HBA1C MFR BLD: 7.1 %

## 2022-06-22 PROCEDURE — 4004F PT TOBACCO SCREEN RCVD TLK: CPT | Performed by: NURSE PRACTITIONER

## 2022-06-22 PROCEDURE — G8399 PT W/DXA RESULTS DOCUMENT: HCPCS | Performed by: NURSE PRACTITIONER

## 2022-06-22 PROCEDURE — G8427 DOCREV CUR MEDS BY ELIG CLIN: HCPCS | Performed by: NURSE PRACTITIONER

## 2022-06-22 PROCEDURE — 3017F COLORECTAL CA SCREEN DOC REV: CPT | Performed by: NURSE PRACTITIONER

## 2022-06-22 PROCEDURE — G8417 CALC BMI ABV UP PARAM F/U: HCPCS | Performed by: NURSE PRACTITIONER

## 2022-06-22 PROCEDURE — 1090F PRES/ABSN URINE INCON ASSESS: CPT | Performed by: NURSE PRACTITIONER

## 2022-06-22 PROCEDURE — 99214 OFFICE O/P EST MOD 30 MIN: CPT | Performed by: NURSE PRACTITIONER

## 2022-06-22 PROCEDURE — 1123F ACP DISCUSS/DSCN MKR DOCD: CPT | Performed by: NURSE PRACTITIONER

## 2022-06-22 PROCEDURE — 2022F DILAT RTA XM EVC RTNOPTHY: CPT | Performed by: NURSE PRACTITIONER

## 2022-06-22 PROCEDURE — 82962 GLUCOSE BLOOD TEST: CPT | Performed by: NURSE PRACTITIONER

## 2022-06-22 PROCEDURE — 83036 HEMOGLOBIN GLYCOSYLATED A1C: CPT | Performed by: NURSE PRACTITIONER

## 2022-06-22 PROCEDURE — 3051F HG A1C>EQUAL 7.0%<8.0%: CPT | Performed by: NURSE PRACTITIONER

## 2022-06-22 RX ORDER — ROSUVASTATIN CALCIUM 20 MG/1
20 TABLET, COATED ORAL DAILY
Qty: 90 TABLET | Refills: 3 | Status: SHIPPED
Start: 2022-06-22 | End: 2022-10-24 | Stop reason: SDUPTHER

## 2022-06-22 RX ORDER — VENLAFAXINE HYDROCHLORIDE 150 MG/1
150 CAPSULE, EXTENDED RELEASE ORAL DAILY
Qty: 90 CAPSULE | Refills: 3 | Status: SHIPPED
Start: 2022-06-22 | End: 2022-10-24 | Stop reason: SDUPTHER

## 2022-06-22 RX ORDER — PANTOPRAZOLE SODIUM 40 MG/1
40 TABLET, DELAYED RELEASE ORAL DAILY
Qty: 90 TABLET | Refills: 3 | Status: SHIPPED
Start: 2022-06-22 | End: 2022-10-24 | Stop reason: SDUPTHER

## 2022-06-22 ASSESSMENT — ENCOUNTER SYMPTOMS
CONSTIPATION: 0
ABDOMINAL PAIN: 0
SINUS PAIN: 0
FACIAL SWELLING: 0
COUGH: 0
NAUSEA: 0
SHORTNESS OF BREATH: 0
VOMITING: 0
BACK PAIN: 0
COLOR CHANGE: 0
RHINORRHEA: 0
DIARRHEA: 0
SORE THROAT: 0
VOICE CHANGE: 0
WHEEZING: 0
CHEST TIGHTNESS: 0
TROUBLE SWALLOWING: 0
SINUS PRESSURE: 0

## 2022-06-22 ASSESSMENT — PATIENT HEALTH QUESTIONNAIRE - PHQ9
SUM OF ALL RESPONSES TO PHQ QUESTIONS 1-9: 4
SUM OF ALL RESPONSES TO PHQ QUESTIONS 1-9: 2
8. MOVING OR SPEAKING SO SLOWLY THAT OTHER PEOPLE COULD HAVE NOTICED. OR THE OPPOSITE, BEING SO FIGETY OR RESTLESS THAT YOU HAVE BEEN MOVING AROUND A LOT MORE THAN USUAL: 0
SUM OF ALL RESPONSES TO PHQ QUESTIONS 1-9: 2
SUM OF ALL RESPONSES TO PHQ QUESTIONS 1-9: 4
1. LITTLE INTEREST OR PLEASURE IN DOING THINGS: 1
SUM OF ALL RESPONSES TO PHQ9 QUESTIONS 1 & 2: 2
7. TROUBLE CONCENTRATING ON THINGS, SUCH AS READING THE NEWSPAPER OR WATCHING TELEVISION: 0
SUM OF ALL RESPONSES TO PHQ QUESTIONS 1-9: 4
5. POOR APPETITE OR OVEREATING: 0
2. FEELING DOWN, DEPRESSED OR HOPELESS: 1
3. TROUBLE FALLING OR STAYING ASLEEP: 1
6. FEELING BAD ABOUT YOURSELF - OR THAT YOU ARE A FAILURE OR HAVE LET YOURSELF OR YOUR FAMILY DOWN: 0
1. LITTLE INTEREST OR PLEASURE IN DOING THINGS: 1
2. FEELING DOWN, DEPRESSED OR HOPELESS: 1
SUM OF ALL RESPONSES TO PHQ9 QUESTIONS 1 & 2: 2
4. FEELING TIRED OR HAVING LITTLE ENERGY: 1
SUM OF ALL RESPONSES TO PHQ QUESTIONS 1-9: 2
SUM OF ALL RESPONSES TO PHQ QUESTIONS 1-9: 2
10. IF YOU CHECKED OFF ANY PROBLEMS, HOW DIFFICULT HAVE THESE PROBLEMS MADE IT FOR YOU TO DO YOUR WORK, TAKE CARE OF THINGS AT HOME, OR GET ALONG WITH OTHER PEOPLE: 1
SUM OF ALL RESPONSES TO PHQ QUESTIONS 1-9: 4
9. THOUGHTS THAT YOU WOULD BE BETTER OFF DEAD, OR OF HURTING YOURSELF: 0

## 2022-06-22 ASSESSMENT — LIFESTYLE VARIABLES: HOW OFTEN DO YOU HAVE A DRINK CONTAINING ALCOHOL: NEVER

## 2022-06-22 NOTE — ASSESSMENT & PLAN NOTE
reasonably well controlled, no significant medication side effects noted and labs reviewed lipid panel, continuecrestor 20 mg daily  -Discussed low fat diet, limit fast food, goodies, breads and pastas if consuming several days a week,  limit any alcohol consumption.  -Discussed weight reduction and exercise 30 minutes 5 days a week for total of 150 minutes weekly.  -Discussed if any unusual muscle aching/pain to contact the office, discussed medication and risk of muscle pain/damage from Rhabdomyolysis.

## 2022-06-22 NOTE — PROGRESS NOTES
OFFICE PROGRESS NOTE  [address]  [address] [place] [address] [id number]  Dept: [phone]   Chief Complaint   Patient presents with    Diabetes    Hyperlipidemia    Hypertension    Health Maintenance     colonoscopy done in feb with uriel, due for shingles. due for AWV soon. ASSESSMENT/PLAN   1. Type 2 diabetes mellitus without complication, without long-term current use of insulin ([facility])  Assessment & Plan:   reasonably well controlled, no significant medication side effects noted, needs to follow diet more regularly and A1c 7.1 today discussed cutting back on the fruit continue Victoza 1.8 mg daily, labs reviewed and unremarkable CMP, CBCD, lipids, TSH, Ft4  Monitor BS at different times: 1 day fasting, then next day 2 hours after lunch, next day 2 hours after dinner, next day at bedtime then start over and log all values. Bring log to next appointment  Foot exam every day; wash and dry well between toes, look for any redness, cracks, wounds notify provider if any problems occur  Reminder for annual Eye exam  Reminder for Podiatry visits Q 2 Months for toenail care if needed  Reminder to keep vaccines up dated  Exercise 30 minutes daily  Recommend Diabetic Education Classes if you have not already attended  Orders:  -     POCT glycosylated hemoglobin (Hb A1C)  -     POCT Glucose  2. Essential hypertension  3.  Mixed hyperlipidemia  Assessment & Plan:   reasonably well controlled, no significant medication side effects noted and labs reviewed lipid panel, continuecrestor 20 mg daily  -Discussed low fat diet, limit fast food, goodies, breads and pastas if consuming several days a week,  limit any alcohol consumption.  -Discussed weight reduction and exercise 30 minutes 5 days a week for total of 150 minutes weekly.  -Discussed if any unusual muscle aching/pain to contact the office, discussed medication and risk of muscle pain/damage from Rhabdomyolysis. Orders:  -     rosuvastatin (CRESTOR) 20 MG tablet; Take 1 tablet by mouth daily For cholesterol, Disp-90 tablet, R-3Requesting 1 year supplyNormal  4. Major depressive disorder with single episode, in full remission (HCC)  -     venlafaxine (EFFEXOR XR) 150 MG extended release capsule; Take 1 capsule by mouth daily, Disp-90 capsule, R-3Requesting 1 year supplyNormal  5. Gastroesophageal reflux disease with esophagitis without hemorrhage  Assessment & Plan:  Stable continue pantoprazole 40 mg daily if needed, labs reviewed CBCD, CMP, lipids, TSH, Ft4 unremarkable   -Discussed elevated the HOB 30 degrees  -Discussed limiting spicy, fatty, greasy foods  -Discussed limit caffeine consumption to 1 - 2 cups daily  -Discussed waiting at least 3 - 4 hours before going to bed after eating  -Discussed not to eat and bend over immediately or lift heavy items.  -Discussed weight reduction if needed even 5 - 10 pounds.  -Discussed taking medications 1 hour prior to eating. Orders:  -     pantoprazole (PROTONIX) 40 MG tablet; Take 1 tablet by mouth daily, Disp-90 tablet, R-3Requesting 1 year supplyNormal  6. Severe obesity (BMI 35.0-39. 9) with comorbidity [facility])  Assessment & Plan:   Improving continue working on weight loss  7. Chronic hip pain, bilateral  -     Amb External Referral To Rheumatology  8. 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  Chronic pain of multiple joints  Assessment & Plan:  Dr Khanh Emmanuel feels she has RA as he reviewed her x rays will check labs and referral to Dr Taylor Og   Orders:  -     MARY; Future  -     Antimitochondrial antibody; Future  -     Sedimentation Rate; Future  -     Rheumatoid Factor; Future  -     F-ACTIN (SMOOTH MUSCLE) AB IGG; Future  -     CCP Antibodies, IGG/IGA; Future  -     Anti-RNP (PÉREZ Ab); Future  -     Anti-Neutrophilic Cytoplasmic Antibody; Future  -     Angiotensin Converting Enzyme; Future  -     Amb External Referral To Rheumatology  10. Gastroesophageal reflux disease without esophagitis  Assessment & Plan:  Stable continue pantoprazole 40 mg daily if needed, labs reviewed CBCD, CMP, lipids, TSH, Ft4 unremarkable   -Discussed elevated the HOB 30 degrees  -Discussed limiting spicy, fatty, greasy foods  -Discussed limit caffeine consumption to 1 - 2 cups daily  -Discussed waiting at least 3 - 4 hours before going to bed after eating  -Discussed not to eat and bend over immediately or lift heavy items.  -Discussed weight reduction if needed even 5 - 10 pounds.  -Discussed taking medications 1 hour prior to eating. Reviewed labs: CMP, CBCD, Lipids, TSH, FT4, vitamin D  Reviewed notes from DR Khanh Emmanuel 12/2021      Discussed weight loss, Discussed exercising 30 minutes daily and Discussed taking medications as directed and adverse effects    Return in about 2 months (around 8/22/2022) for Medicare well visit. HPI:     74 y.o. female presents today for follow-up of diabetes [place]. In-office bloodsugar is:   Results for POC orders placed in visit on 06/22/22   POCT glycosylated hemoglobin (Hb A1C)   Result Value Ref Range    Hemoglobin A1C 7.1 %   POCT Glucose   Result Value Ref Range    Glucose 158 mg/dL    QC OK? Patient reports eating a lot of fresh fruits carbohydrate diet and increasing weekly exercises. Currently, the patient is treated with medication(s): victoza 1.8 mg daily.   Patient reports checking home blood sugar 1 times per day. Patient states home blood sugar ranges from 130 - 190. Patient denies hypoglycemic episodes and understand to take sweetened beverages or a snack if hypoglycemic symptoms are suspected.       Hypertension: Patient here for follow-up of elevated blood pressure. She is not exercising and is adherent to low salt diet.  Blood pressure is well controlled at home. Cardiac symptoms none. Patient denies chest pain, chest pressure/discomfort, claudication, dyspnea, exertional chest pressure/discomfort, fatigue, irregular heart beat, lower extremity edema, near-syncope, orthopnea, palpitations, paroxysmal nocturnal dyspnea, syncope and tachypnea.  Cardiovascular risk factors: advanced age (older than 54 for men, 72 for women), diabetes mellitus, dyslipidemia, hypertension, obesity (BMI >= 30 kg/m2), sedentary lifestyle and smoking/ tobacco exposure. Use of agents associated with hypertension: thyroid hormones. History of target organ damage: none.     Hyperlipidemia: Patient presents with hyperlipidemia on crestor 20 mg nightly.  She was tested because DM, hypertension, hyperlipidemia.  Her last labs 6/20/22  showed Total cholesterol of 164, HDL 47, LDL 77,  Triglycerides 198. Denies  chest pain, dyspnea, exertional chest pressure/discomfort, fatigue, feeding intolerance, lower extremity edema, palpitations, poor exercise tolerance, syncope, tachypnea and skin xanthelasma. There is a family history of hyperlipidemia. There is not a family history of early ischemia heart disease.     Depression: Patient complains of depression and increasing the Effexor  mg daily. She complains of no depression symptoms at this time and negative depression screening today. depressed mood, hopelessness and insomnia have resolved.  Onset was approximately several years ago, gradually worsening since that time.  She denies current suicidal and homicidal plan or intent.   Family history significant for no psychiatric illness. Possible organic causes contributing are: none.  Risk factors: positive family history in  daughter, negative life event daughter has cancer and previous episode of depression Previous treatment includes Effexor . She complains of the following side effects from the treatment: none. GERD has been stable no red flags needs refill today    She saw DR Laith Humphries and he feels she has some Rheumatoid arthritis starting in her hands and hips.  She is requesting to see Rheumatologist.       Sg Gutierrez vital signs are as follows:  /62   Pulse 90   Temp 97.2 °F (36.2 °C)   Resp 16   Ht 5' 2.5\" (1.588 m)   Wt 196 lb 12.8 oz (89.3 kg)   LMP 03/01/1978 (Approximate)   SpO2 93%   BMI 35.42 kg/m²     Lab Results   Component Value Date    LABA1C 7.1 06/22/2022            Current Outpatient Medications:     Misc Natural Products (ELDERBERRY ZINC/VIT C/IMMUNE MT), Take by mouth, Disp: , Rfl:     Multiple Vitamin (MULTI-VITAMIN DAILY PO), Take by mouth, Disp: , Rfl:     rosuvastatin (CRESTOR) 20 MG tablet, Take 1 tablet by mouth daily For cholesterol, Disp: 90 tablet, Rfl: 3    venlafaxine (EFFEXOR XR) 150 MG extended release capsule, Take 1 capsule by mouth daily, Disp: 90 capsule, Rfl: 3    pantoprazole (PROTONIX) 40 MG tablet, Take 1 tablet by mouth daily, Disp: 90 tablet, Rfl: 3    BD PEN NEEDLE SHANEL U/F 32G X 4 MM MISC, Inject 1 each into the skin daily, Disp: 100 each, Rfl: 4    ondansetron (ZOFRAN) 4 MG tablet, Take 1 tablet by mouth 3 times daily as needed for Nausea or Vomiting, Disp: 30 tablet, Rfl: 0    ONETOUCH VERIO strip, TEST TWICE DAILY AND AS  NEEDED FOR SYMPTOMS OF  IRREGULAR BLOOD GLUCOSE, Disp: 200 strip, Rfl: 3    Lancets (ONETOUCH DELICA PLUS [id number]) MISC, TEST TWICE DAILY, Disp: 200 each, Rfl: 3    ibuprofen (ADVIL;MOTRIN) 800 MG tablet, Take 1 tablet by mouth every 6 hours as needed for Pain, Disp: 120 tablet, Rfl: 1    Liraglutide (VICTOZA) 18 MG/3ML SOPN SC injection, Inject 1.8 mg into the skin daily, Disp: 27 pen, Rfl: 3    hydroCHLOROthiazide (HYDRODIURIL) 25 MG tablet, TAKE 1 TABLET ONCE DAILY FOR BLOOD PRESSURE, Disp: 90 tablet, Rfl: 3    levothyroxine (SYNTHROID) 75 MCG tablet, Take 1 tablet by mouth daily 1 hour before breakfast For thyroid, Disp: 90 tablet, Rfl: 3    glucose monitoring kit (FREESTYLE) monitoring kit, 1 kit by Does not apply route daily, Disp: 1 kit, Rfl: 0    Blood Glucose Monitoring Suppl (TRUE METRIX METER) w/Device KIT, Test two times a day., Disp: 1 kit, Rfl: 0    aspirin 81 MG tablet, Take 81 mg by mouth daily, Disp: , Rfl:       Surgical History:  has a past surgical history that includes Gallbladder surgery (2006); Hysterectomy (1980); Foot surgery; Cholecystectomy; and Total Thyroidectomy (2017). Social History:  reports that she has been smoking cigarettes. She started smoking about 5 years ago. She has a 13.00 pack-year smoking history. She has never used smokeless tobacco. She reports that she does not drink alcohol and does not use drugs. Family History: family history includes Diabetes in her father and mother; Heart Disease in her mother; High Cholesterol in her father and mother. I have reviewed Selena's allergies, medications, problem list, medical, social and family history and have updated as needed in the electronic medical record    Review of Systems   Constitutional: Negative for activity change, appetite change, chills, diaphoresis, fatigue, fever and unexpected weight change. HENT: Negative for congestion, dental problem, drooling, ear discharge, ear pain, facial swelling, hearing loss, mouth sores, nosebleeds, postnasal drip, rhinorrhea, sinus pressure, sinus pain, sneezing, sore throat, tinnitus, trouble swallowing and voice change. Eyes: Negative for visual disturbance. Respiratory: Negative for cough, chest tightness, shortness of breath and wheezing.     Cardiovascular: Negative for chest pain, palpitations and leg swelling. Gastrointestinal: Negative for abdominal pain, constipation, diarrhea, nausea and vomiting. Endocrine: Negative for cold intolerance, heat intolerance, polydipsia, polyphagia and polyuria. Genitourinary: Negative for difficulty urinating, frequency and urgency. Musculoskeletal: Negative for arthralgias, back pain, gait problem, joint swelling, myalgias, neck pain and neck stiffness. Skin: Negative for color change, pallor, rash and wound. Allergic/Immunologic: Negative for environmental allergies, food allergies and immunocompromised state. Neurological: Negative for dizziness, tremors, seizures, syncope, facial asymmetry, speech difficulty, weakness, light-headedness, numbness and headaches. Hematological: Negative for adenopathy. Does not bruise/bleed easily. Psychiatric/Behavioral: Negative for agitation, behavioral problems, confusion, decreased concentration, dysphoric mood, hallucinations, self-injury, sleep disturbance and suicidal ideas. The patient is not nervous/anxious and is not hyperactive.         OBJECTIVE:     VS:  Wt Readings from Last 3 Encounters:   06/22/22 196 lb 12.8 oz (89.3 kg)   01/31/22 193 lb (87.5 kg)   01/10/22 192 lb (87.1 kg)                       Vitals:    06/22/22 1620   BP: 108/62   Pulse: 90   Resp: 16   Temp: 97.2 °F (36.2 °C)   SpO2: 93%   Weight: 196 lb 12.8 oz (89.3 kg)   Height: 5' 2.5\" (1.588 m)       General: Alert and oriented to person, place, and time, well developed and well nourished, in no acute distress  SKIN: Warm and dry, intact without any rash, masses or lesions  HEAD: normocephalic, atraumatic  Eyes: sclera/conjunctiva clear, PERRLA, EOMI's intact  ENT: tympanic membranes, external ear and ear canal normal bilaterally, normal hearing, Nose without deformity, nasal mucosa and turbinates normal without polyps   Throat: clear, tongue midline,  drainage, no masses or lesions noted, good dentition  Neck: supple and non-tender without mass, trachea midline, no cervical lymphadenopathy, no bruit, no thyromegaly or nodules  Cardiovascular: regular rate and regular rhythm,   1/6 systolic at 2nd right intercostal space, throughout the precordium, radiates to axilla,no murmurs, rubs, clicks, or gallop. Distal pulses intact, no carotid bruits. No edema  Pulmonary/Chest: clear to auscultation bilaterally, no wheezes, rales or rhonchi, normal air movement, no respiratory distress  Abdomen: soft, non-tender, non-distended, normal bowel sounds, no masses or hepatosplenomegaly  Musculoskeletal: decreased ROM in both thumbs, hips,  joint swelling, deformity + tenderness   Neurologic:  gait, coordination and speech normal  Extremities: no clubbing, cyanosis, or edema. Psychiatric: Good eye contact, normal mood and affect, answers questions appropriately    I have reviewed my findings and recommendations with Karan Reddy.     Sahil Barton, APRN - CNP, NP-C, FNP-BC

## 2022-06-22 NOTE — ASSESSMENT & PLAN NOTE
Dr Rock Shane feels she has RA as he reviewed her x rays will check labs and referral to Dr Patric Thomas

## 2022-06-22 NOTE — ASSESSMENT & PLAN NOTE
Stable continue pantoprazole 40 mg daily if needed, labs reviewed CBCD, CMP, lipids, TSH, Ft4 unremarkable   -Discussed elevated the HOB 30 degrees  -Discussed limiting spicy, fatty, greasy foods  -Discussed limit caffeine consumption to 1 - 2 cups daily  -Discussed waiting at least 3 - 4 hours before going to bed after eating  -Discussed not to eat and bend over immediately or lift heavy items.  -Discussed weight reduction if needed even 5 - 10 pounds.  -Discussed taking medications 1 hour prior to eating.

## 2022-06-22 NOTE — ASSESSMENT & PLAN NOTE
reasonably well controlled, no significant medication side effects noted, needs to follow diet more regularly and A1c 7.1 today discussed cutting back on the fruit continue Victoza 1.8 mg daily, labs reviewed and unremarkable CMP, CBCD, lipids, TSH, Ft4  Monitor BS at different times: 1 day fasting, then next day 2 hours after lunch, next day 2 hours after dinner, next day at bedtime then start over and log all values.   Bring log to next appointment  Foot exam every day; wash and dry well between toes, look for any redness, cracks, wounds notify provider if any problems occur  Reminder for annual Eye exam  Reminder for Podiatry visits Q 2 Months for toenail care if needed  Reminder to keep vaccines up dated  Exercise 30 minutes daily  Recommend Diabetic Education Classes if you have not already attended

## 2022-06-22 NOTE — ASSESSMENT & PLAN NOTE
Dr Kristian Mcgregor feels she has RA as he reviewed her x rays will check labs and referral to Dr Radha Zabala

## 2022-06-23 ENCOUNTER — HOSPITAL ENCOUNTER (OUTPATIENT)
Age: 67
Discharge: HOME | End: 2022-06-23
Payer: MEDICARE

## 2022-06-23 DIAGNOSIS — G89.29 CHRONIC PAIN OF MULTIPLE JOINTS: ICD-10-CM

## 2022-06-23 DIAGNOSIS — M25.50 CHRONIC PAIN OF MULTIPLE JOINTS: ICD-10-CM

## 2022-06-23 DIAGNOSIS — M15.8 DEGENERATIVE ARTHRITIS OF INTERPHALANGEAL JOINT OF LEFT THUMB: ICD-10-CM

## 2022-06-23 LAB
RHEUMATOID FACTOR: <10 IU/ML (ref 0–13)
SEDIMENTATION RATE, ERYTHROCYTE: 1 MM/HR (ref 0–20)

## 2022-06-23 PROCEDURE — 86255 FLUORESCENT ANTIBODY SCREEN: CPT

## 2022-06-23 PROCEDURE — 83516 IMMUNOASSAY NONANTIBODY: CPT

## 2022-06-23 PROCEDURE — 86431 RHEUMATOID FACTOR QUANT: CPT

## 2022-06-23 PROCEDURE — 86256 FLUORESCENT ANTIBODY TITER: CPT

## 2022-06-23 PROCEDURE — 36415 COLL VENOUS BLD VENIPUNCTURE: CPT

## 2022-06-23 PROCEDURE — 82164 ANGIOTENSIN I ENZYME TEST: CPT

## 2022-06-23 PROCEDURE — 86235 NUCLEAR ANTIGEN ANTIBODY: CPT

## 2022-06-23 PROCEDURE — 85651 RBC SED RATE NONAUTOMATED: CPT

## 2022-06-23 PROCEDURE — 86038 ANTINUCLEAR ANTIBODIES: CPT

## 2022-06-23 PROCEDURE — 86200 CCP ANTIBODY: CPT

## 2022-06-24 LAB
ANTI-MITOCHONDRIAL AB, IFA: NEGATIVE
ANTI-NUCLEAR ANTIBODY (ANA): NEGATIVE
ENA TO RNP ANTIBODY: NEGATIVE

## 2022-06-25 LAB — ANGIOTENSIN CONVERTING ENZYME: 11 U/L (ref 16–85)

## 2022-06-27 PROBLEM — Z98.890 HX OF COLONOSCOPY WITH POLYPECTOMY: Status: ACTIVE | Noted: 2022-06-27

## 2022-06-27 PROBLEM — Z86.0100 HX OF COLONOSCOPY WITH POLYPECTOMY: Status: ACTIVE | Noted: 2022-06-27

## 2022-06-27 PROBLEM — Z86.010 HX OF COLONOSCOPY WITH POLYPECTOMY: Status: ACTIVE | Noted: 2022-06-27

## 2022-06-27 LAB
MYELOPEROXIDASE AB: 0 AU/ML (ref 0–19)
SERINE PROTEASE 3 AB: 0 AU/ML (ref 0–19)

## 2022-06-30 ENCOUNTER — TELEPHONE (OUTPATIENT)
Dept: FAMILY MEDICINE CLINIC | Age: 67
End: 2022-06-30

## 2022-06-30 DIAGNOSIS — K75.9 HEPATITIS: ICD-10-CM

## 2022-06-30 DIAGNOSIS — R89.9 ABNORMAL LABORATORY TEST RESULT: Primary | ICD-10-CM

## 2022-06-30 LAB
Lab: NORMAL
REPORT: NORMAL
THIS TEST SENT TO: NORMAL

## 2022-06-30 NOTE — TELEPHONE ENCOUNTER
Spoke with patient regarding labs results and referral to Dr Ryan Caceres placed will get hepatitis panel next week

## 2022-07-03 LAB
Lab: NORMAL
REPORT: NORMAL
THIS TEST SENT TO: NORMAL

## 2022-07-05 ENCOUNTER — PATIENT MESSAGE (OUTPATIENT)
Dept: FAMILY MEDICINE CLINIC | Age: 67
End: 2022-07-05

## 2022-07-05 DIAGNOSIS — K75.9 HEPATITIS: ICD-10-CM

## 2022-07-06 LAB
HAV IGM SER IA-ACNC: NORMAL
HEPATITIS B CORE IGM ANTIBODY: NORMAL
HEPATITIS B SURFACE ANTIGEN INTERPRETATION: NORMAL
HEPATITIS C ANTIBODY INTERPRETATION: NORMAL

## 2022-07-06 NOTE — TELEPHONE ENCOUNTER
From: Anne Muniz  To: Leah Hedrick  Sent: 7/5/2022 10:34 PM EDT  Subject: Feet    Do I have to have a referral to, make appt to go see Dr Crista Farmer foot . My ingrown are killing me. Geetha Alan

## 2022-07-22 ENCOUNTER — OFFICE VISIT (OUTPATIENT)
Dept: ORTHOPEDIC SURGERY | Age: 67
End: 2022-07-22
Payer: MEDICARE

## 2022-07-22 VITALS — BODY MASS INDEX: 33.31 KG/M2 | WEIGHT: 188 LBS | HEIGHT: 63 IN

## 2022-07-22 DIAGNOSIS — M65.4 DE QUERVAIN'S TENOSYNOVITIS, RIGHT: Primary | ICD-10-CM

## 2022-07-22 PROCEDURE — 1090F PRES/ABSN URINE INCON ASSESS: CPT | Performed by: ORTHOPAEDIC SURGERY

## 2022-07-22 PROCEDURE — G8427 DOCREV CUR MEDS BY ELIG CLIN: HCPCS | Performed by: ORTHOPAEDIC SURGERY

## 2022-07-22 PROCEDURE — 1123F ACP DISCUSS/DSCN MKR DOCD: CPT | Performed by: ORTHOPAEDIC SURGERY

## 2022-07-22 PROCEDURE — 99213 OFFICE O/P EST LOW 20 MIN: CPT | Performed by: ORTHOPAEDIC SURGERY

## 2022-07-22 PROCEDURE — G8417 CALC BMI ABV UP PARAM F/U: HCPCS | Performed by: ORTHOPAEDIC SURGERY

## 2022-07-22 PROCEDURE — 3017F COLORECTAL CA SCREEN DOC REV: CPT | Performed by: ORTHOPAEDIC SURGERY

## 2022-07-22 PROCEDURE — G8399 PT W/DXA RESULTS DOCUMENT: HCPCS | Performed by: ORTHOPAEDIC SURGERY

## 2022-07-22 PROCEDURE — 4004F PT TOBACCO SCREEN RCVD TLK: CPT | Performed by: ORTHOPAEDIC SURGERY

## 2022-07-22 PROCEDURE — 20550 NJX 1 TENDON SHEATH/LIGAMENT: CPT | Performed by: ORTHOPAEDIC SURGERY

## 2022-07-22 RX ORDER — BETAMETHASONE SODIUM PHOSPHATE AND BETAMETHASONE ACETATE 3; 3 MG/ML; MG/ML
3 INJECTION, SUSPENSION INTRA-ARTICULAR; INTRALESIONAL; INTRAMUSCULAR; SOFT TISSUE ONCE
Status: COMPLETED | OUTPATIENT
Start: 2022-07-22 | End: 2022-07-22

## 2022-07-22 RX ADMIN — BETAMETHASONE SODIUM PHOSPHATE AND BETAMETHASONE ACETATE 3 MG: 3; 3 INJECTION, SUSPENSION INTRA-ARTICULAR; INTRALESIONAL; INTRAMUSCULAR; SOFT TISSUE at 12:13

## 2022-07-22 NOTE — PROGRESS NOTES
Ines Melo is a 74 y.o. female, who presents   Chief Complaint   Patient presents with    Hand Pain     rt       HPI[de-identified] Right wrist pain is been present for some time. There is been no history of injury. It is apparently excruciating and difficult to use the arm and hand as a result. Is very much like the left shoulder was prior to the injection which was apparently about a year and a half ago. Allergies; medications; past medical, surgical, family, and social history; and problem list have been reviewed today and updated as indicated in this encounter - see below following Ortho specifics. Musculoskeletal: Skin condition gross neurovascular functions good in right upper extremity. Elbow shoulder and hand range of motion are good. The wrist range of motion is restricted. There is tenderness to palpation over the dorsal first dorsal compartment. Pamula Geovany test is positive. There is no pain at the A1 band. Thumb motion is good with the pain associated. Radiologic Studies: None    ASSESSMENT:  Sharon Lindo was seen today for hand pain. Diagnoses and all orders for this visit:    Hugo Sierra tenosynovitis, right  -     WA INJECT TENDON SHEATH/LIGAMENT    Other orders  -     betamethasone acetate-betamethasone sodium phosphate (CELESTONE) injection 3 mg   Treatment alternatives were reviewed including medical and physical therapies, injections, and surgical options, expected risks benefits and likely outcome of each were discussed in detail, questions asked and answered and understood. We discussed the symptom as well as physical findings. This is consistent with first dorsal compartment tenosynovitis as was present on the left side. Treatment options are bracing, topical and injection with surgery as a definitive option. Kishan Platt wants it injected. PLAN: Injectionof the first dorsal compartment right wrist with Celestone  (betamethasone)   3mg was discussed with the patient.    Discussion included but was not limited to potential risks and benefits. No contraindications to the procedure were noted. Understanding and agreement was indicated. The procedure was accomplished without incident using appropriate aseptic technique. follow up as needed          Patient Active Problem List   Diagnosis    Type 2 diabetes mellitus without complication, without long-term current use of insulin (HCC)    Vitamin D deficiency    Abnormal EKG    Nonrheumatic aortic valve stenosis    Mixed hyperlipidemia    Severe obesity (BMI 35.0-39. 9) with comorbidity (HCC)    Leg length difference, acquired    Chronic midline low back pain with left-sided sciatica    Postoperative hypothyroidism    Gastroesophageal reflux disease without esophagitis    FHx: breast cancer in first degree relative    Family history of BRCA gene positive    Major depressive disorder with single episode, in full remission ([address]    Essential hypertension    Screening mammogram for high-risk patient    Age-related osteoporosis without current pathological fracture     Tobacco use disorder    Chronic right shoulder pain    Chronic hip pain, bilateral    Elevated hemoglobin (HCC)    At high risk for falls    Wrist pain, acute, left    De Quervain's tenosynovitis, left    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    Chronic pain of multiple joints    Hx of colonoscopy with polypectomy       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 6/22/2022    Diabetes ([address]     Elevated red blood cell count 9/13/2019    GERD (gastroesophageal reflux disease)     Hx of colonoscopy with polypectomy 6/27/2022    Colonoscopy 9/21/21 DR Perla, moderate diverticulosis sigmoid colon, 10 mm polyp rectum hyperplastic, 16 mm polyp hepatic flexure sessile serrated adenoma, 10 mm ascending polyp tubular adenoma, sessile serrated adenoma Repeat in 3 years Hyperlipidemia     Hypertension     Hypertension     Hypothyroidism     Neuropathy     Obesity     Severe episode of recurrent major depressive disorder, without psychotic features ([facility] 2/14/2018    Tinea pedis of both feet 9/13/2019    Type 2 diabetes mellitus without complication ([facility]        Past Surgical History:   Procedure Laterality Date    CHOLECYSTECTOMY      FOOT SURGERY      HEEL SPURS BOTH FEET    GALLBLADDER SURGERY  2006    HYSTERECTOMY (CERVIX STATUS UNKNOWN)  1980    TOTAL THYROIDECTOMY  2017    goiter with compressive symptoms       Current Outpatient Medications   Medication Sig Dispense Refill    Misc Natural Products (ELDERBERRY ZINC/VIT C/IMMUNE MT) Take by mouth      Multiple Vitamin (MULTI-VITAMIN DAILY PO) Take by mouth      rosuvastatin (CRESTOR) 20 MG tablet Take 1 tablet by mouth daily For cholesterol 90 tablet 3    venlafaxine (EFFEXOR XR) 150 MG extended release capsule Take 1 capsule by mouth daily 90 capsule 3    pantoprazole (PROTONIX) 40 MG tablet Take 1 tablet by mouth daily 90 tablet 3    BD PEN NEEDLE SHANEL U/F 32G X 4 MM MISC Inject 1 each into the skin daily 100 each 4    ondansetron (ZOFRAN) 4 MG tablet Take 1 tablet by mouth 3 times daily as needed for Nausea or Vomiting 30 tablet 0    ONETOUCH VERIO strip TEST TWICE DAILY AND AS  NEEDED FOR SYMPTOMS OF  IRREGULAR BLOOD GLUCOSE 200 strip 3    Lancets (ONETOUCH DELICA PLUS [id number]) MISC TEST TWICE DAILY 200 each 3    ibuprofen (ADVIL;MOTRIN) 800 MG tablet Take 1 tablet by mouth every 6 hours as needed for Pain 120 tablet 1    Liraglutide (VICTOZA) 18 MG/3ML SOPN SC injection Inject 1.8 mg into the skin daily 27 pen 3    hydroCHLOROthiazide (HYDRODIURIL) 25 MG tablet TAKE 1 TABLET ONCE DAILY FOR BLOOD PRESSURE 90 tablet 3    levothyroxine (SYNTHROID) 75 MCG tablet Take 1 tablet by mouth daily 1 hour before breakfast For thyroid 90 tablet 3    glucose monitoring kit (FREESTYLE) monitoring kit 1 kit by Does not apply route daily 1 kit 0    Blood Glucose Monitoring Suppl (TRUE METRIX METER) w/Device KIT Test two times a day. 1 kit 0    aspirin 81 MG tablet Take 81 mg by mouth daily       No current facility-administered medications for this visit. Allergies   Allergen Reactions    Bactrim [Sulfamethoxazole-Trimethoprim] Shortness Of Breath     N/v/aching    Atenolol      Anxiousness muscles hurt    Lipitor [Atorvastatin] Other (See Comments)     cough    Metformin And Related Diarrhea     Severe 11 - 12 times a day       Social History     Socioeconomic History    Marital status: Single     Spouse name: None    Number of children: None    Years of education: None    Highest education level: None   Tobacco Use    Smoking status: Every Day     Packs/day: 0.50     Years: 26.00     Pack years: 13.00     Types: Cigarettes     Start date: 3/20/2017     Last attempt to quit: 10/25/2017     Years since quittin.7    Smokeless tobacco: Never    Tobacco comments:     20 years 1 pack last 2 days only smoking 3 cigarettes a day   Vaping Use    Vaping Use: Never used   Substance and Sexual Activity    Alcohol use: No     Alcohol/week: 0.0 standard drinks    Drug use: No    Sexual activity: Yes       Family History   Problem Relation Age of Onset    Heart Disease Mother     Diabetes Mother          diabetic neuropathy    High Cholesterol Mother     Diabetes Father          from diabetic neuropathy stated patient    High Cholesterol Father          Review of Systems:   As follows except as previously noted in HPI:  Constitutional: Negative for chills, diaphoresis,  fever   Respiratory: Negative for cough, shortness of breath and wheezing. Cardiovascular: Negative for chest pain and palpitations. Neurological: Negative for dizziness, syncope,   GI / : abdominal pain or cramping  Musculoskeletal: see HPI       Objective:   Physical Exam   Constitutional: Oriented to person, place, and time.  and appears well-developed and well-nourished. :   Head: Normocephalic and atraumatic. Neck: Neck supple. Eyes: EOM are normal.   Pulmonary/Chest: Effort normal.  No respiratory distress, no wheezes. Neurological: Alert and oriented to person  Skin: Skin is warm and dry. Moriah Hinkle DO    7/22/22  12:14 PM    All reasonable efforts have been made to minimize the risk of errors that may occur in the use of voice recognition and other electronic means of charting.

## 2022-08-01 ENCOUNTER — OFFICE VISIT (OUTPATIENT)
Dept: ORTHOPEDIC SURGERY | Age: 67
End: 2022-08-01
Payer: MEDICARE

## 2022-08-01 VITALS — BODY MASS INDEX: 33.31 KG/M2 | HEIGHT: 63 IN | WEIGHT: 188 LBS | TEMPERATURE: 97.4 F

## 2022-08-01 DIAGNOSIS — M18.11 ARTHRITIS OF CARPOMETACARPAL (CMC) JOINT OF RIGHT THUMB: ICD-10-CM

## 2022-08-01 DIAGNOSIS — M25.552 HIP PAIN, BILATERAL: Primary | ICD-10-CM

## 2022-08-01 DIAGNOSIS — M47.818 ARTHRITIS OF SACROILIAC JOINT OF BOTH SIDES: ICD-10-CM

## 2022-08-01 DIAGNOSIS — M25.551 HIP PAIN, BILATERAL: Primary | ICD-10-CM

## 2022-08-01 PROCEDURE — 4004F PT TOBACCO SCREEN RCVD TLK: CPT | Performed by: ORTHOPAEDIC SURGERY

## 2022-08-01 PROCEDURE — G8417 CALC BMI ABV UP PARAM F/U: HCPCS | Performed by: ORTHOPAEDIC SURGERY

## 2022-08-01 PROCEDURE — 1090F PRES/ABSN URINE INCON ASSESS: CPT | Performed by: ORTHOPAEDIC SURGERY

## 2022-08-01 PROCEDURE — 1123F ACP DISCUSS/DSCN MKR DOCD: CPT | Performed by: ORTHOPAEDIC SURGERY

## 2022-08-01 PROCEDURE — 99213 OFFICE O/P EST LOW 20 MIN: CPT | Performed by: ORTHOPAEDIC SURGERY

## 2022-08-01 PROCEDURE — G8427 DOCREV CUR MEDS BY ELIG CLIN: HCPCS | Performed by: ORTHOPAEDIC SURGERY

## 2022-08-01 PROCEDURE — G8399 PT W/DXA RESULTS DOCUMENT: HCPCS | Performed by: ORTHOPAEDIC SURGERY

## 2022-08-01 PROCEDURE — 3017F COLORECTAL CA SCREEN DOC REV: CPT | Performed by: ORTHOPAEDIC SURGERY

## 2022-08-01 NOTE — PROGRESS NOTES
Chief Complaint:   Chief Complaint   Patient presents with    Knee Pain     Pain level 4     Hand Pain     Right hand pain/pain level 7.pt states shot did not help hand pain     Hip Pain     Pain level 10        Selena Cary follows up regarding her hips today. Had pain in both hip areas for years. She indicates the area above her greater trochanters and going transverse across her low back. This if its sciatic nerve problem. She also has some discomfort in the right thumb. She feels that the last injection we gave her did not help. She indicates the thenar wad area. Allergies; medications; past medical, surgical, family, and social history; and problem list have been reviewed today and updated as indicated in this encounter seen below. Exam: Exam of the right hand reveals tenderness around the thumb carpometacarpal joint. She has no tenderness to palpation in the first dorsal compartment which is what had been injected recently. That appears to be successful. She has slight adduction of the thumb metacarpal and prominence at the [facility] joint which would reflect degenerative changes. There is some tenderness to palpation bilaterally above the greater trochanters and a little bit medial as well. Range of motion of the hips is excellent with no instability or pain. Radiographs: Imaging of the hips in 2021 showed good joint spaces and contours. 2017 films of the pelvis standing shows degenerative changes in both sacroiliac joints which are significant. ASSESSMENT:    Kierra Zelaya was seen today for knee pain, hand pain and hip pain. 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 She does not want an injection in that right now.   She would like some relief for her \"hip\" pain which is more likely to be muscular and sacroiliac related. Will refer her to physical medicine rehab for evaluation for injection of the sacroiliac joints. Her hip joints themselves look great. Return if symptoms worsen or fail to improve. Current Outpatient Medications   Medication Sig Dispense Refill    Misc Natural Products (ELDERBERRY ZINC/VIT C/IMMUNE MT) Take by mouth      Multiple Vitamin (MULTI-VITAMIN DAILY PO) Take by mouth      rosuvastatin (CRESTOR) 20 MG tablet Take 1 tablet by mouth daily For cholesterol 90 tablet 3    venlafaxine (EFFEXOR XR) 150 MG extended release capsule Take 1 capsule by mouth daily 90 capsule 3    pantoprazole (PROTONIX) 40 MG tablet Take 1 tablet by mouth daily 90 tablet 3    BD PEN NEEDLE SHANEL U/F 32G X 4 MM MISC Inject 1 each into the skin daily 100 each 4    ondansetron (ZOFRAN) 4 MG tablet Take 1 tablet by mouth 3 times daily as needed for Nausea or Vomiting 30 tablet 0    ONETOUCH VERIO strip TEST TWICE DAILY AND AS  NEEDED FOR SYMPTOMS OF  IRREGULAR BLOOD GLUCOSE 200 strip 3    Lancets (ONETOUCH DELICA PLUS [id number]) MISC TEST TWICE DAILY 200 each 3    ibuprofen (ADVIL;MOTRIN) 800 MG tablet Take 1 tablet by mouth every 6 hours as needed for Pain 120 tablet 1    Liraglutide (VICTOZA) 18 MG/3ML SOPN SC injection Inject 1.8 mg into the skin daily 27 pen 3    hydroCHLOROthiazide (HYDRODIURIL) 25 MG tablet TAKE 1 TABLET ONCE DAILY FOR BLOOD PRESSURE 90 tablet 3    levothyroxine (SYNTHROID) 75 MCG tablet Take 1 tablet by mouth daily 1 hour before breakfast For thyroid 90 tablet 3    glucose monitoring kit (FREESTYLE) monitoring kit 1 kit by Does not apply route daily 1 kit 0    Blood Glucose Monitoring Suppl (TRUE METRIX METER) w/Device KIT Test two times a day. 1 kit 0    aspirin 81 MG tablet Take 81 mg by mouth daily       No current facility-administered medications for this visit.        Patient Active Problem List   Diagnosis    Type 2 diabetes mellitus without complication, without long-term current use of insulin (HCC)    Vitamin D deficiency    Abnormal EKG    Nonrheumatic aortic valve stenosis    Mixed hyperlipidemia    Severe obesity (BMI 35.0-39. 9) with comorbidity (HCC)    Leg length difference, acquired    Chronic midline low back pain with left-sided sciatica    Postoperative hypothyroidism    Gastroesophageal reflux disease without esophagitis    FHx: breast cancer in first degree relative    Family history of BRCA gene positive    Major depressive disorder with single episode, in full remission ([address]    Essential hypertension    Screening mammogram for high-risk patient    Age-related osteoporosis without current pathological fracture     Tobacco use disorder    Chronic right shoulder pain    Chronic hip pain, bilateral    Elevated hemoglobin (HCC)    At high risk for falls    Wrist pain, acute, left    De Quervain's tenosynovitis, left    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    Chronic pain of multiple joints    Hx of colonoscopy with polypectomy       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 6/22/2022    Diabetes ([address]     Elevated red blood cell count 9/13/2019    GERD (gastroesophageal reflux disease)     Hx of colonoscopy with polypectomy 6/27/2022    Colonoscopy 9/21/21 DR Prela, moderate diverticulosis sigmoid colon, 10 mm polyp rectum hyperplastic, 16 mm polyp hepatic flexure sessile serrated adenoma, 10 mm ascending polyp tubular adenoma, sessile serrated adenoma Repeat in 3 years     Hyperlipidemia     Hypertension     Hypertension     Hypothyroidism     Neuropathy     Obesity     Severe episode of recurrent major depressive disorder, without psychotic features ([address] 2/14/2018    Tinea pedis of both feet 9/13/2019    Type 2 diabetes mellitus without complication ([address]        Past Surgical History:   Procedure Laterality Date    CHOLECYSTECTOMY FOOT SURGERY      HEEL SPURS BOTH FEET    GALLBLADDER SURGERY  2006    HYSTERECTOMY (CERVIX STATUS UNKNOWN)  1980    TOTAL THYROIDECTOMY  2017    goiter with compressive symptoms       Allergies   Allergen Reactions    Bactrim [Sulfamethoxazole-Trimethoprim] Shortness Of Breath     N/v/aching    Atenolol      Anxiousness muscles hurt    Lipitor [Atorvastatin] Other (See Comments)     cough    Metformin And Related Diarrhea     Severe 11 - 12 times a day       Social History     Socioeconomic History    Marital status: Single     Spouse name: None    Number of children: None    Years of education: None    Highest education level: None   Tobacco Use    Smoking status: Every Day     Packs/day: 0.50     Years: 26.00     Pack years: 13.00     Types: Cigarettes     Start date: 3/20/2017     Last attempt to quit: 10/25/2017     Years since quittin.7    Smokeless tobacco: Never    Tobacco comments:     20 years 1 pack last 2 days only smoking 3 cigarettes a day   Vaping Use    Vaping Use: Never used   Substance and Sexual Activity    Alcohol use: No     Alcohol/week: 0.0 standard drinks    Drug use: No    Sexual activity: Yes       Review of Systems  As follows except as previously noted in HPI:  Constitutional: Negative for chills, diaphoresis, fatigue, fever and unexpected weight change. Respiratory: Negative for cough, shortness of breath and wheezing. Cardiovascular: Negative for chest pain and palpitations. Neurological: Negative for dizziness, syncope, cephalgia. GI / : negative  Musculoskeletal: see HPI       Objective:   Physical Exam   Constitutional: Oriented to person, place, and time. and appears well-developed and well-nourished. :   Head: Normocephalic and atraumatic. Eyes: EOM are normal.   Neck: Neck supple. Cardiovascular: Normal rate and regular rhythm. Pulmonary/Chest: Effort normal. No stridor. No respiratory distress, no wheezes. Abdominal:  No abnormal distension. Neurological: Alert and oriented to person, place, and time. Skin: Skin is warm and dry. Psychiatric: Normal mood and affect.  Behavior is normal. Thought content normal.    DIGNA Lin DO    8/1/22  3:33 PM

## 2022-08-02 DIAGNOSIS — M47.818 ARTHRITIS OF SACROILIAC JOINT OF BOTH SIDES: Primary | ICD-10-CM

## 2022-08-22 DIAGNOSIS — E11.9 TYPE 2 DIABETES MELLITUS WITHOUT COMPLICATION, WITHOUT LONG-TERM CURRENT USE OF INSULIN (HCC): Primary | ICD-10-CM

## 2022-09-06 DIAGNOSIS — I10 ESSENTIAL HYPERTENSION: ICD-10-CM

## 2022-09-06 DIAGNOSIS — E89.0 POSTOPERATIVE HYPOTHYROIDISM: ICD-10-CM

## 2022-09-07 RX ORDER — LEVOTHYROXINE SODIUM 0.07 MG/1
75 TABLET ORAL DAILY
Qty: 90 TABLET | Refills: 3 | OUTPATIENT
Start: 2022-09-07

## 2022-09-07 RX ORDER — HYDROCHLOROTHIAZIDE 25 MG/1
TABLET ORAL
Qty: 90 TABLET | Refills: 3 | OUTPATIENT
Start: 2022-09-07

## 2022-09-26 SDOH — HEALTH STABILITY: PHYSICAL HEALTH: ON AVERAGE, HOW MANY MINUTES DO YOU ENGAGE IN EXERCISE AT THIS LEVEL?: 10 MIN

## 2022-09-26 SDOH — HEALTH STABILITY: PHYSICAL HEALTH: ON AVERAGE, HOW MANY DAYS PER WEEK DO YOU ENGAGE IN MODERATE TO STRENUOUS EXERCISE (LIKE A BRISK WALK)?: 1 DAY

## 2022-09-26 ASSESSMENT — LIFESTYLE VARIABLES
HOW OFTEN DO YOU HAVE A DRINK CONTAINING ALCOHOL: 2
HOW OFTEN DO YOU HAVE SIX OR MORE DRINKS ON ONE OCCASION: 1
HOW MANY STANDARD DRINKS CONTAINING ALCOHOL DO YOU HAVE ON A TYPICAL DAY: 1
HOW MANY STANDARD DRINKS CONTAINING ALCOHOL DO YOU HAVE ON A TYPICAL DAY: 1 OR 2
HOW OFTEN DO YOU HAVE A DRINK CONTAINING ALCOHOL: MONTHLY OR LESS

## 2022-09-26 ASSESSMENT — PATIENT HEALTH QUESTIONNAIRE - PHQ9
1. LITTLE INTEREST OR PLEASURE IN DOING THINGS: 0
6. FEELING BAD ABOUT YOURSELF - OR THAT YOU ARE A FAILURE OR HAVE LET YOURSELF OR YOUR FAMILY DOWN: 0
9. THOUGHTS THAT YOU WOULD BE BETTER OFF DEAD, OR OF HURTING YOURSELF: 0
SUM OF ALL RESPONSES TO PHQ QUESTIONS 1-9: 0
10. IF YOU CHECKED OFF ANY PROBLEMS, HOW DIFFICULT HAVE THESE PROBLEMS MADE IT FOR YOU TO DO YOUR WORK, TAKE CARE OF THINGS AT HOME, OR GET ALONG WITH OTHER PEOPLE: 0
7. TROUBLE CONCENTRATING ON THINGS, SUCH AS READING THE NEWSPAPER OR WATCHING TELEVISION: 0
SUM OF ALL RESPONSES TO PHQ QUESTIONS 1-9: 0
4. FEELING TIRED OR HAVING LITTLE ENERGY: 0
2. FEELING DOWN, DEPRESSED OR HOPELESS: 0
SUM OF ALL RESPONSES TO PHQ QUESTIONS 1-9: 0
3. TROUBLE FALLING OR STAYING ASLEEP: 0
SUM OF ALL RESPONSES TO PHQ9 QUESTIONS 1 & 2: 0
5. POOR APPETITE OR OVEREATING: 0
SUM OF ALL RESPONSES TO PHQ QUESTIONS 1-9: 0
8. MOVING OR SPEAKING SO SLOWLY THAT OTHER PEOPLE COULD HAVE NOTICED. OR THE OPPOSITE, BEING SO FIGETY OR RESTLESS THAT YOU HAVE BEEN MOVING AROUND A LOT MORE THAN USUAL: 0

## 2022-09-27 ENCOUNTER — TELEPHONE (OUTPATIENT)
Dept: FAMILY MEDICINE CLINIC | Age: 67
End: 2022-09-27

## 2022-09-27 ENCOUNTER — OFFICE VISIT (OUTPATIENT)
Dept: PHYSICAL MEDICINE AND REHAB | Age: 67
End: 2022-09-27
Payer: MEDICARE

## 2022-09-27 VITALS
HEART RATE: 85 BPM | WEIGHT: 199 LBS | DIASTOLIC BLOOD PRESSURE: 70 MMHG | HEIGHT: 63 IN | BODY MASS INDEX: 35.26 KG/M2 | SYSTOLIC BLOOD PRESSURE: 162 MMHG

## 2022-09-27 DIAGNOSIS — M53.3 SACROILIAC DYSFUNCTION: Primary | ICD-10-CM

## 2022-09-27 PROCEDURE — 20552 NJX 1/MLT TRIGGER POINT 1/2: CPT | Performed by: PHYSICAL MEDICINE & REHABILITATION

## 2022-09-27 RX ORDER — AMMONIUM LACTATE 12 G/100G
LOTION TOPICAL
COMMUNITY
Start: 2022-07-21

## 2022-09-29 ENCOUNTER — OFFICE VISIT (OUTPATIENT)
Dept: FAMILY MEDICINE CLINIC | Age: 67
End: 2022-09-29
Payer: MEDICARE

## 2022-09-29 VITALS
WEIGHT: 194 LBS | RESPIRATION RATE: 16 BRPM | DIASTOLIC BLOOD PRESSURE: 76 MMHG | TEMPERATURE: 97.6 F | SYSTOLIC BLOOD PRESSURE: 138 MMHG | HEIGHT: 63 IN | BODY MASS INDEX: 34.38 KG/M2 | HEART RATE: 83 BPM | OXYGEN SATURATION: 93 %

## 2022-09-29 DIAGNOSIS — Z12.31 ENCOUNTER FOR SCREENING MAMMOGRAM FOR BREAST CANCER: ICD-10-CM

## 2022-09-29 DIAGNOSIS — Z00.00 MEDICARE ANNUAL WELLNESS VISIT, SUBSEQUENT: Primary | ICD-10-CM

## 2022-09-29 DIAGNOSIS — Z87.891 PERSONAL HISTORY OF TOBACCO USE, PRESENTING HAZARDS TO HEALTH: ICD-10-CM

## 2022-09-29 DIAGNOSIS — Z78.0 ENCOUNTER FOR OSTEOPOROSIS SCREENING IN ASYMPTOMATIC POSTMENOPAUSAL PATIENT: ICD-10-CM

## 2022-09-29 DIAGNOSIS — Z87.891 PERSONAL HISTORY OF TOBACCO USE: ICD-10-CM

## 2022-09-29 DIAGNOSIS — Z13.820 ENCOUNTER FOR OSTEOPOROSIS SCREENING IN ASYMPTOMATIC POSTMENOPAUSAL PATIENT: ICD-10-CM

## 2022-09-29 DIAGNOSIS — Z23 NEEDS FLU SHOT: ICD-10-CM

## 2022-09-29 PROCEDURE — 90694 VACC AIIV4 NO PRSRV 0.5ML IM: CPT | Performed by: NURSE PRACTITIONER

## 2022-09-29 PROCEDURE — G0296 VISIT TO DETERM LDCT ELIG: HCPCS | Performed by: NURSE PRACTITIONER

## 2022-09-29 PROCEDURE — G0008 ADMIN INFLUENZA VIRUS VAC: HCPCS | Performed by: NURSE PRACTITIONER

## 2022-09-29 PROCEDURE — G0439 PPPS, SUBSEQ VISIT: HCPCS | Performed by: NURSE PRACTITIONER

## 2022-09-29 PROCEDURE — 99406 BEHAV CHNG SMOKING 3-10 MIN: CPT | Performed by: NURSE PRACTITIONER

## 2022-09-29 PROCEDURE — 1123F ACP DISCUSS/DSCN MKR DOCD: CPT | Performed by: NURSE PRACTITIONER

## 2022-09-29 RX ORDER — TRIAMCINOLONE ACETONIDE 40 MG/ML
40 INJECTION, SUSPENSION INTRA-ARTICULAR; INTRAMUSCULAR ONCE
Status: COMPLETED | OUTPATIENT
Start: 2022-09-29 | End: 2022-09-29

## 2022-09-29 RX ORDER — LIDOCAINE HYDROCHLORIDE 10 MG/ML
3 INJECTION, SOLUTION INFILTRATION; PERINEURAL ONCE
Status: COMPLETED | OUTPATIENT
Start: 2022-09-29 | End: 2022-09-29

## 2022-09-29 RX ADMIN — TRIAMCINOLONE ACETONIDE 40 MG: 40 INJECTION, SUSPENSION INTRA-ARTICULAR; INTRAMUSCULAR at 15:57

## 2022-09-29 RX ADMIN — LIDOCAINE HYDROCHLORIDE 3 ML: 10 INJECTION, SOLUTION INFILTRATION; PERINEURAL at 15:56

## 2022-09-29 SDOH — ECONOMIC STABILITY: INCOME INSECURITY: IN THE LAST 12 MONTHS, WAS THERE A TIME WHEN YOU WERE NOT ABLE TO PAY THE MORTGAGE OR RENT ON TIME?: NO

## 2022-09-29 SDOH — ECONOMIC STABILITY: FOOD INSECURITY: WITHIN THE PAST 12 MONTHS, THE FOOD YOU BOUGHT JUST DIDN'T LAST AND YOU DIDN'T HAVE MONEY TO GET MORE.: NEVER TRUE

## 2022-09-29 SDOH — ECONOMIC STABILITY: FOOD INSECURITY: WITHIN THE PAST 12 MONTHS, YOU WORRIED THAT YOUR FOOD WOULD RUN OUT BEFORE YOU GOT MONEY TO BUY MORE.: NEVER TRUE

## 2022-09-29 ASSESSMENT — SOCIAL DETERMINANTS OF HEALTH (SDOH): HOW HARD IS IT FOR YOU TO PAY FOR THE VERY BASICS LIKE FOOD, HOUSING, MEDICAL CARE, AND HEATING?: NOT HARD AT ALL

## 2022-09-29 NOTE — ASSESSMENT & PLAN NOTE
Low Dose CT (LDCT) Lung Screening criteria met:     Age 50-77(Medicare) or 50-80 ([facility])   Pack year smoking >20   Still smoking or less than 15 year since quit   No sign or symptoms of lung cancer   > 11 months since last LDCT     Risks and benefits of lung cancer screening with LDCT scans discussed:    Significance of positive screen - False-positive LDCT results often occur. 95% of all positive results do not lead to a diagnosis of cancer. Usually further imaging can resolve most false-positive results; however, some patients may require invasive procedures. Over diagnosis risk - 10% to 12% of screen-detected lung cancer cases are over diagnosed--that is, the cancer would not have been detected in the patient's lifetime without the screening. Need for follow up screens annually to continue lung cancer screening effectiveness     Risks associated with radiation from annual LDCT- Radiation exposure is about the same as for a mammogram, which is about 1/3 of the annual background radiation exposure from everyday life. Starting screening at age 54 is not likely to increase cancer risk from radiation exposure. Patients with comorbidities resulting in life expectancy of < 10 years, or that would preclude treatment of an abnormality identified on CT, should not be screened due to lack of benefit.     To obtain maximal benefit from this screening, smoking cessation and long-term abstinence from smoking is critical

## 2022-09-29 NOTE — PROGRESS NOTES
Medicare Annual Wellness Visit    Brielle Gallo is here for Medicare AWV and Health Maintenance (Due for mammo(would like script), shingles. )    Assessment & Plan   Medicare annual wellness visit, subsequent  Encounter for osteoporosis screening in asymptomatic postmenopausal patient  -     DEXA BONE DENSITY AXIAL SKELETON; Future  Personal history of tobacco use  -     NE VISIT TO DISCUSS LUNG CA SCREEN W LDCT  -     CT Lung Screen (Annual); Future  Personal history of tobacco use, presenting hazards to health  Assessment & Plan:   Low Dose CT (LDCT) Lung Screening criteria met:     Age 50-77(Medicare) or 50-80 ([facility])   Pack year smoking >20   Still smoking or less than 15 year since quit   No sign or symptoms of lung cancer   > 11 months since last LDCT     Risks and benefits of lung cancer screening with LDCT scans discussed:    Significance of positive screen - False-positive LDCT results often occur. 95% of all positive results do not lead to a diagnosis of cancer. Usually further imaging can resolve most false-positive results; however, some patients may require invasive procedures. Over diagnosis risk - 10% to 12% of screen-detected lung cancer cases are over diagnosed--that is, the cancer would not have been detected in the patient's lifetime without the screening. Need for follow up screens annually to continue lung cancer screening effectiveness     Risks associated with radiation from annual LDCT- Radiation exposure is about the same as for a mammogram, which is about 1/3 of the annual background radiation exposure from everyday life. Starting screening at age 54 is not likely to increase cancer risk from radiation exposure. Patients with comorbidities resulting in life expectancy of < 10 years, or that would preclude treatment of an abnormality identified on CT, should not be screened due to lack of benefit.     To obtain maximal benefit from this screening, smoking cessation and long-term abstinence from smoking is critical      Orders:  -     [place] TOBACCO USE CESSATION INTERMEDIATE 3-10 MINUTES [18583]  Encounter for screening mammogram for breast cancer  -     [facility] DIGITAL SCREEN BILATERAL; Future  Needs flu shot  Assessment & Plan:   Wash your hands regularly, and keep your hands away from your face. Cover your mouth when you cough or sneeze. Rest, plenty of fluids, Tylenol for body aches, fever. Stay home from school, work, and other public places until you are feeling better and your fever has been gone for at least 24 hours. The fever needs to have gone away on its own without the help of medicine. Ask people living with you to talk to their doctors about preventing the flu. They may get antiviral medicine to keep from getting the flu from you. To prevent the flu in the future, get a flu vaccine every fall. Encourage people living with you to get the vaccine. Orders:  -     Influenza, FLUAD, (age 72 y+), IM, PF, 0.5 mL    Recommendations for Preventive Services Due: see orders and patient instructions/AVS.  Recommended screening schedule for the next 5-10 years is provided to the patient in written form: see Patient Instructions/AVS.     Return in 1 month (on 10/29/2022) for Medicare Annual Wellness Visit in 1 year, DM, HTN, hyperlipidemia, hypothyroid. Subjective       Patient's complete Health Risk Assessment and screening values have been reviewed and are found in Flowsheets. The following problems were reviewed today and where indicated follow up appointments were made and/or referrals ordered.     Positive Risk Factor Screenings with Interventions:       Tobacco Use:  Tobacco Use: High Risk    Smoking Tobacco Use: Every Day    Smokeless Tobacco Use: Never     E-cigarette/Vaping       Questions Responses    E-cigarette/Vaping Use Never User    Start Date     Passive Exposure     Quit Date     Counseling Given     Comments           Substance Use - Tobacco Interventions:  tobacco cessation tips and resources provided, patient is weaning off cigarettes, provider spent 3 minutes counseling patient         General Health and ACP:  General  In general, how would you say your health is?: Fair  In the past 7 days, have you experienced any of the following: New or Increased Pain, New or Increased Fatigue, Loneliness, Social Isolation, Stress or Anger?: (!) Yes  Select all that apply: (!) New or Increased Pain  Do you get the social and emotional support that you need?: Yes  Do you have a Living Will?: Yes    Advance Directives       Power of  Living Will ACP-Advance Directive ACP-Power of     Not on File [place] on 04/22/19 [address] Risk Interventions:  Pain issues: patient has appt with ortho on Friday    Health Habits/Nutrition:  Physical Activity: Insufficiently Active    Days of Exercise per Week: 1 day    Minutes of Exercise per Session: 10 min     Have you lost any weight without trying in the past 3 months?: No  Body mass index: (!) 34.36  Have you seen the dentist within the past year?: Yes  Health Habits/Nutrition Interventions:  Inadequate physical activity:  patient is walking twice a week since she had her injection in her back  Nutritional issues:  educational materials for healthy, well-balanced diet provided    Hearing/Vision:  Do you or your family notice any trouble with your hearing that hasn't been managed with hearing aids?: (!) Yes  Do you have difficulty driving, watching TV, or doing any of your daily activities because of your eyesight?: No  Have you had an eye exam within the past year?: Yes  No results found.   Hearing/Vision Interventions:  Hearing concerns:  audiology referral provided, ENT referral provided            Objective   Vitals:    09/29/22 1620   BP: 138/76   Pulse: 83   Resp: 16   Temp: 97.6 °F (36.4 °C)   SpO2: 93%   Weight: 194 lb (88 kg)   Height: 5' 3\" (1.6 m)      Body mass index is 34.37 kg/m². General Appearance: alert and oriented to person, place and time, well developed and well- nourished, obese,  in no acute distress  Skin: warm and dry, no rash or erythema  Head: normocephalic and atraumatic  Eyes: pupils equal, round, and reactive to light, extraocular eye movements intact, conjunctivae normal  ENT: tympanic membrane, external ear and ear canal normal bilaterally, nose without deformity, nasal mucosa and turbinates normal without polyps  Neck: supple and non-tender without mass, no thyromegaly or thyroid nodules, no cervical lymphadenopathy  Pulmonary/Chest: clear to auscultation bilaterally- no wheezes, rales or rhonchi, normal air movement, no respiratory distress  Cardiovascular: normal rate, regular rhythm, normal S1 and S2, no murmurs, rubs, clicks, or gallops, distal pulses intact, no carotid bruits  Abdomen: soft, non-tender, non-distended, normal bowel sounds, no masses or organomegaly  Breast: appear normal, no suspicious masses, no skin or nipple changes or axillary nodes  Extremities: no cyanosis, clubbing or edema  Musculoskeletal: normal range of motion, thumbs arthritic,  joint swelling right thumb, deformity + tenderness  Neurologic: reflexes normal and symmetric, no cranial nerve deficit, gait, coordination and speech normal       Allergies   Allergen Reactions    Bactrim [Sulfamethoxazole-Trimethoprim] Shortness Of Breath     N/v/aching    Atenolol      Anxiousness muscles hurt    Lipitor [Atorvastatin] Other (See Comments)     cough    Metformin And Related Diarrhea     Severe 11 - 12 times a day     Prior to Visit Medications    Medication Sig Taking?  Authorizing Provider   ammonium lactate (LAC-HYDRIN) 12 % lotion APPLY TO RIGHT AND LEFT FOOT TO DRY SKIN AND NAILS DAILY Yes Historical Provider, MD   Misc Natural Products (ELDERBERRY ZINC/VIT C/IMMUNE MT) Take by mouth Yes Historical Provider, MD   Multiple Vitamin (MULTI-VITAMIN DAILY PO) Take by mouth Yes Historical Provider, MD   rosuvastatin (CRESTOR) 20 MG tablet Take 1 tablet by mouth daily For cholesterol Yes COLE Cotto CNP   venlafaxine (EFFEXOR XR) 150 MG extended release capsule Take 1 capsule by mouth daily Yes COLE Cotto CNP   pantoprazole (PROTONIX) 40 MG tablet Take 1 tablet by mouth daily Yes COLE Gutiérrez CNP   BD PEN NEEDLE SHANEL U/F 32G X 4 MM MISC Inject 1 each into the skin daily Yes COLE Cotto CNP   ondansetron (ZOFRAN) 4 MG tablet Take 1 tablet by mouth 3 times daily as needed for Nausea or Vomiting Yes COLE Gutiérrez CNP   ONETOUCH VERIO strip TEST TWICE DAILY AND AS  NEEDED FOR SYMPTOMS OF  IRREGULAR BLOOD GLUCOSE Yes COLE Gutiérrez CNP   Lancets ([address]) MISC TEST TWICE DAILY Yes COLE Gutiérrez CNP   ibuprofen (ADVIL;MOTRIN) 800 MG tablet Take 1 tablet by mouth every 6 hours as needed for Pain Yes COLE Cotto CNP   Liraglutide (VICTOZA) 18 MG/3ML SOPN SC injection Inject 1.8 mg into the skin daily Yes COLE Cotto CNP   hydroCHLOROthiazide (HYDRODIURIL) 25 MG tablet TAKE 1 TABLET ONCE DAILY FOR BLOOD PRESSURE Yes COLE Cotto CNP   levothyroxine (SYNTHROID) 75 MCG tablet Take 1 tablet by mouth daily 1 hour before breakfast For thyroid Yes COLE Cotto CNP   glucose monitoring kit (FREESTYLE) monitoring kit 1 kit by Does not apply route daily Yes COLE Cotto CNP   aspirin 81 MG tablet Take 81 mg by mouth daily Yes Historical Provider, MD Patrick (Including outside providers/suppliers regularly involved in providing care):   Patient Care Team:  COLE Cotto CNP as PCP - General (Nurse Practitioner)  COLE Cotto C[facility]  Kortney Chao MD as Consulting Physician (Cardiology)     Reviewed and updated this visit:  Tobacco  Allergies  Meds  Problems  Med Hx  Surg Hx  Soc Hx  Fam Hx               Cardiovascular Disease Risk Counseling: Assessed the patient's risk to develop cardiovascular disease and reviewed main risk factors. Reviewed steps to reduce disease risk including:   Quitting tobacco use, reducing amount smoked, or not starting the habit  Making healthy food choices  Being physically active and gradualy increasing activity levels   Reduce weight and determine a healthy BMI goal  Monitor blood pressure and treat if higher than 140/90 mmHg  Maintain blood total cholesterol levels under 5 mmol/l or 190 mg/dl  Maintain LDL cholesterol levels under 3.0 mmol/l or 115 mg/dl   Control blood glucose levels  Consider taking aspirin (75 mg daily), once blood pressure is controlled   Provided a follow up plan. Time spent (minutes): 10  Obesity Counseling: Assessed behavioral health risks and factors affecting choice of behavior. Suggested weight control approaches, including dietary changes behavioral modification and follow up plan. Provided educational and support documentation. Time spent (minutes): 10  Tobacco Cessation Counseling: Patient advised about behavior change, including information about personal health harms, usage of appropriate cessation measures and benefits of cessation.   Time spent (minutes): 3

## 2022-09-29 NOTE — PATIENT INSTRUCTIONS
What is lung cancer screening? Lung cancer screening is a way in which doctors check the lungs for early signs of cancer in people who have no symptoms of lung cancer. A low-dose CT scan uses much less radiation than a normal CT scan and shows a more detailed image of the lungs than a standard X-ray. The goal of lung cancer screening is to find cancer early, before it has a chance to grow, spread, or cause problems. One large study found that smokers who were screened with low-dose CT scans were less likely to die of lung cancer than those who were screened with standard X-ray. Below is a summary of the things you need to know regarding screening for lung cancer with low-dose computed tomography (LDCT). This is a screening program that involves routine annual screening with LDCT studies of the lung. The LDCTs are done using low-dose radiation that is not thought to increase your cancer risk. If you have other serious medical conditions (other cancers, congestive heart failure) that limit your life expectancy to less than 10 years, you should not undergo lung cancer screening with LDCT. The chance is 20%-60% that the LDCT result will show abnormalities. This would require additional testing which could include repeat imaging or even invasive procedures. Most (about 95%) of \"abnormal\" LDCT results are false in the sense that no lung cancer is ultimately found. Additionally, some (about 10%) of the cancers found would not affect your life expectancy, even if undetected and untreated. If you are still smoking, the single most important thing that you can do to reduce your risk of dying of lung cancer is to quit. For this screening to be covered by Medicare and most other insurers, strict criteria must be met. If you do not meet these criteria, but still wish to undergo LDCT testing, you will be required to sign a waiver indicating your willingness to pay for the scan.        Learning About Healthy Weight  What is a healthy weight? A healthy weight is the weight at which you feel good about yourself and have energy for work and play. It's also one that lowers your risk for health problems. What can you do to stay at a healthy weight? It can be hard to stay at a healthy weight, especially when fast food, vending-machine snacks, and processed foods are so easy to find. And with your busy lifestyle, activity may be low on your list of things to do. But staying at a healthy weight may be easier than you think. Here are some dos and don'ts for staying at a healthy weight. Do eat healthy foods  The kinds of foods you eat have a big impact on both your weight and your health. Reaching and staying at a healthy weight is not about going on a diet. It's about making healthier food choices every day and changing your diet for good. Healthy eating means eating a variety of foods so that you get all the nutrients you need. Your body needs protein, carbohydrate, and fats for energy. They keep your heart beating, your brain active, and your muscles working. On most days, try to eat from each food group. This means eating a variety of: Whole grains, such as whole wheat breads and pastas. Fruits and vegetables. Dairy products, such as low-fat milk, yogurt, and cheese. Lean proteins, such as all types of fish, chicken without the skin, and beans. Don't have too much or too little of one thing. All foods, if eaten in moderation, can be part of healthy eating. Even sweets can be okay. If your favorite foods are high in fat, salt, sugar, or calories, limit how often you eat them. Eat smaller servings, or look for healthy substitutes. Do watch what you eat  Many people eat more than their bodies need. Part of staying at a healthy weight means learning how much food you really need from day to day and not eating more than that. Even with healthy foods, eating too much can make you gain weight.   Having a well-balanced diet means that you eat enough, but not too much, and that your food gives you the nutrients you need to stay healthy. So listen to your body. Eat when you're hungry. Stop when you feel satisfied. It's a good idea to have healthy snacks ready for when you get hungry. Keep healthy snacks with you at work, in your car, and at home. If you have a healthy snack easily available, you'll be less likely to pick a candy bar or bag of chips from a vending machine instead. Some healthy snacks you might want to keep on hand are fruit, low-fat yogurt, string cheese, low-fat microwave popcorn, raisins and other dried fruit, nuts, whole wheat crackers, pretzels, carrots, celery sticks, and broccoli. Do some physical activity  A big part of reaching and staying at a healthy weight is being active. When you're active, you burn calories. This makes it easier to reach and stay at a healthy weight. When you're active on a regular basis, your body burns more calories, even when you're at rest. Being active helps you lose fat and build lean muscle. Try to be active for at least 1 hour every day. This may sound like a lot, but it's okay to be active in smaller blocks of time that add up to 1 hour a day. Any activity that makes your heart beat faster and keeps it there for a while counts. A brisk walk, run, or swim will get your heart beating faster. So will climbing stairs, shooting baskets, or cycling. Even some household chores like vacuuming and mowing the lawn will get your heart rate up. Pick activities that you enjoy--ones that make your heart beat faster, your muscles stronger, and your muscles and joints more flexible. If you find more than one thing you like doing, do them all. You don't have to do the same thing every day. Don't diet  Diets don't work. Diets are temporary. Because you give up so much when you diet, you may be hungry and think about food all the time.  And after you stop dieting, you also may overeat to make up for what you missed. Most people who diet end up gaining back the pounds they lost--and more. Remember that healthy bodies come in lots of shapes and sizes. Everyone can get healthier by eating better and being more active. Where can you learn more? Go to https://chpepiceweb.BlogHer. org and sign in to your [employer] account. Enter 729 7742 in the [employer] box to learn more about \"Learning About Healthy Weight. \"     If you do not have an account, please click on the \"Sign Up Now\" link. Current as of: December 27, 2021               Content Version: 13.4  © 4586-4313 [employer]. Care instructions adapted under license by [facility] ([facility]). If you have questions about a medical condition or this instruction, always ask your healthcare professional. [address] any warranty or liability for your use of this information. Learning About Low-Carbohydrate Diets  What is a low-carbohydrate diet? A low-carbohydrate (or \"low-carb\") diet limits foods and drinks that have carbohydrates. This includes grains, fruits, milk and yogurt, and starchy vegetables like potatoes, beans, and corn. It also avoids foods and drinks that have added sugar. Instead, low-carb diets include foods that are high in protein and fat. Why might you follow a low-carb diet? Low-carb diets may be used for a variety of reasons, such as for weight loss. People who have diabetes may use a low-carb diet to help manage their blood sugar levels. What should you do before you start the diet? Talk to your doctor before you try any diet. This is even more important if you have health problems like kidney disease, heart disease, or diabetes. Your doctor may suggest that you meet with a registered dietitian. A dietitian can help you make an eating plan that works for you. What foods do you eat on a low-carb diet?   On a low-carb diet, you choose foods that are high in protein and fat. Examples of these are:  Meat, poultry, and fish. Eggs. Nuts, such as walnuts, pecans, almonds, and peanuts. Peanut butter and other nut butters. Tofu. Avocado. Candida Flex. Non-starchy vegetables like broccoli, cauliflower, green beans, mushrooms, peppers, lettuce, and spinach. Unsweetened non-dairy milks like almond milk and coconut milk. Cheese, cottage cheese, and cream cheese. Where can you learn more? Go to https://Smart PatientspePendleton Woolen Millseweb.healthregistracija vozila. org and sign in to your [employer] account. Enter C335 in the [employer] box to learn more about \"Learning About Low-Carbohydrate Diets. \"     If you do not have an account, please click on the \"Sign Up Now\" link. Current as of: May 9, 2022               Content Version: 13.4  © 2006-2022 [employer]. Care instructions adapted under license by [facility] ([facility]). If you have questions about a medical condition or this instruction, always ask your healthcare professional. [place] [address] any warranty or liability for your use of this information. Eating Healthy Foods: Care Instructions  Your Care Instructions     Eating healthy foods can help lower your risk for disease. Healthy food gives you energy and keeps your heart strong, your brain active, your muscles working, and your bones strong. A healthy diet includes a variety of foods from the basic food groups: grains, vegetables, fruits, milk and milk products, and meat and beans. Some people may eat more of their favorite foods from only one food group and, as a result, miss getting the nutrients they need. So, it is important to pay attention not only to what you eat but also to what you are missing from your diet. You can eat a healthy, balanced diet by making a few small changes. Follow-up care is a key part of your treatment and safety. Be sure to make and go to all appointments, and call your doctor if you are having problems.  It's also a good idea to know your test results and keep a list of the medicines you take. How can you care for yourself at home? Look at what you eat  Keep a food diary for a week or two and record everything you eat or drink. Track the number of servings you eat from each food group. For a balanced diet every day, eat a variety of:  6 or more ounce-equivalents of grains, such as cereals, breads, crackers, rice, or pasta, every day. An ounce-equivalent is 1 slice of bread, 1 cup of ready-to-eat cereal, or ½ cup of cooked rice, cooked pasta, or cooked cereal.  2½ cups of vegetables, especially:  Dark-green vegetables such as broccoli and spinach. Orange vegetables such as carrots and sweet potatoes. Dry beans (such as giraldo and kidney beans) and peas (such as lentils). 2 cups of fresh, frozen, or canned fruit. A small apple or 1 banana or orange equals 1 cup.  3 cups of nonfat or low-fat milk, yogurt, or other milk products. 5½ ounces of meat and beans, such as chicken, fish, lean meat, beans, nuts, and seeds. One egg, 1 tablespoon of peanut butter, ½ ounce nuts or seeds, or ¼ cup of cooked beans equals 1 ounce of meat. Learn how to read food labels for serving sizes and ingredients. Fast-food and convenience-food meals often contain few or no fruits or vegetables. Make sure you eat some fruits and vegetables to make the meal more nutritious. Look at your food diary. For each food group, add up what you have eaten and then divide the total by the number of days. This will give you an idea of how much you are eating from each food group. See if you can find some ways to change your diet to make it more healthy. Start small  Do not try to make dramatic changes to your diet all at once. You might feel that you are missing out on your favorite foods and then be more likely to fail. Start slowly, and gradually change your habits. Try some of the following:  Use whole wheat bread instead of white bread.   Use nonfat or low-fat milk instead of whole milk. Eat brown rice instead of white rice, and eat whole wheat pasta instead of white-flour pasta. Try low-fat cheeses and low-fat yogurt. Add more fruits and vegetables to meals and have them for snacks. Add lettuce, tomato, cucumber, and onion to sandwiches. Add fruit to yogurt and cereal.  Enjoy food  You can still eat your favorite foods. You just may need to eat less of them. If your favorite foods are high in fat, salt, and sugar, limit how often you eat them, but do not cut them out entirely. Eat a wide variety of foods. Make healthy choices when eating out  The type of restaurant you choose can help you make healthy choices. Even fast-food chains are now offering more low-fat or healthier choices on the menu. Choose smaller portions, or take half of your meal home. When eating out, try:  A veggie pizza with a whole wheat crust or grilled chicken (instead of sausage or pepperoni). Pasta with roasted vegetables, grilled chicken, or marinara sauce instead of cream sauce. A vegetable wrap or grilled chicken wrap. Broiled or poached food instead of fried or breaded items. Make healthy choices easy  Buy packaged, prewashed, ready-to-eat fresh vegetables and fruits, such as baby carrots, salad mixes, and chopped or shredded broccoli and cauliflower. Buy packaged, presliced fruits, such as melon or pineapple. Choose 100% fruit or vegetable juice instead of soda. Limit juice intake to 4 to 6 oz (½ to ¾ cup) a day. Blend low-fat yogurt, fruit juice, and canned or frozen fruit to make a smoothie for breakfast or a snack. Where can you learn more? Go to https://Tansna Therapeuticspecarolina.WikiWand. org and sign in to your [employer] account. Enter B266 in the [employer] box to learn more about \"Eating Healthy Foods: Care Instructions. \"     If you do not have an account, please click on the \"Sign Up Now\" link. Current as of:  May 9, 2022               Content Version: 13.4  © 0341-5753 [employer]. Care instructions adapted under license by [facility] ([facility]). If you have questions about a medical condition or this instruction, always ask your healthcare professional. [address] any warranty or liability for your use of this information. Personalized Preventive Plan for Deena Parr - 9/29/2022  Medicare offers a range of preventive health benefits. Some of the tests and screenings are paid in full while other may be subject to a deductible, co-insurance, and/or copay. Some of these benefits include a comprehensive review of your medical history including lifestyle, illnesses that may run in your family, and various assessments and screenings as appropriate. After reviewing your medical record and screening and assessments performed today your provider may have ordered immunizations, labs, imaging, and/or referrals for you. A list of these orders (if applicable) as well as your Preventive Care list are included within your After Visit Summary for your review. Other Preventive Recommendations:    A preventive eye exam performed by an eye specialist is recommended every 1-2 years to screen for glaucoma; cataracts, macular degeneration, and other eye disorders. A preventive dental visit is recommended every 6 months. Try to get at least 150 minutes of exercise per week or 10,000 steps per day on a pedometer . Order or download the FREE \"Exercise & Physical Activity: Your Everyday Guide\" from [employer]. Call 7-297.747.7675 or search [employer] online. You need 6683-7814 mg of calcium and 8213-1782 IU of vitamin D per day. It is possible to meet your calcium requirement with diet alone, but a vitamin D supplement is usually necessary to meet this goal.  When exposed to the sun, use a sunscreen that protects against both UVA and UVB radiation with an SPF of 30 or greater.  Reapply every 2 to 3 hours or after sweating, drying off with a towel, or swimming. Always wear a seat belt when traveling in a car. Always wear a helmet when riding a bicycle or motorcycle.

## 2022-09-29 NOTE — PROGRESS NOTES
Juan Hdz D.O. [facility]  [address] [facility] [address] [facility]  Phone: [phone]  Fax: [phone]    9/30/2022    Chief Complaint   Patient presents with    Back Pain     New patient       Last injection: n/a  Taking anticoagulants/antiplatelets: No  Diabetic: No  Febrile/active infection: No    Ambulatory Procedure Time Out  Correct Patient: Yes  Correct Procedure: Yes  Correct Site/Side: Yes  Correct Site(s) Marked: Yes  Informed Consent Signed: Yes  Allergies Verified: Yes  Staff Present & Credential[de-identified]  Simona Sánchez LPN, Dr. Yuniel Damon DO)    After explaining the indications, risks, benefits and alternatives of a right sacroiliac joint injection, the patient agreed to proceed. A permit was signed and scanned into the chart. The patient was placed in the prone position and draped for modesty. The skin on the Right upper buttock was prepared with Chloraprep. Using aseptic no touch technique, a 22 gauge, 3\" needle with 1 cc of Kenalog 40mg/cc and 1 cc of 1% lidocaine was directed to the joint using ultrasound guidance. After negative aspiration, the medication was injected. Adequate hemostasis was achieved and a bandage applied. The patient tolerated the procedure well and was educated in post injection care. The patient was clinically monitored after the injection and left the office without incident. There was post injection reduction in pain. Ultrasound images are scanned in the electronic medical record separately. Juan Hdz D.O., P.T.   Board Certified Physical Medicine and Rehabilitation  Board Certified Electrodiagnostic Medicine    Administrations This Visit       lidocaine 1 % injection 3 mL       Admin Date  09/29/2022  15:56 Action  Given Dose  3 mL Route  Other Site   Administered By  Naomy Hoskins MA    Ordering Provider: DO Yudith Vicente [address]: [id number]    Lot#: [id number]    : [employer]    Patient Supplied?: No              triamcinolone acetonide (KENALOG-40) injection 40 mg       Admin Date  09/29/2022  15:57 Action  Given Dose  40 mg Route  Intra-artICUlar Site   Administered By  Conrad Burgess MA    Ordering Provider: Nuris Webber DO    NDC: [id number]    Lot#: [id number]    : [facility] (PR[facility])    Patient Supplied?: No

## 2022-10-05 ENCOUNTER — TELEPHONE (OUTPATIENT)
Dept: PHYSICAL MEDICINE AND REHAB | Age: 67
End: 2022-10-05

## 2022-10-05 NOTE — TELEPHONE ENCOUNTER
Called and left message for patient for follow up right SI joint injection will await call back from patient.

## 2022-10-07 ENCOUNTER — OFFICE VISIT (OUTPATIENT)
Dept: ORTHOPEDIC SURGERY | Age: 67
End: 2022-10-07
Payer: MEDICARE

## 2022-10-07 VITALS — TEMPERATURE: 98 F | BODY MASS INDEX: 34.38 KG/M2 | WEIGHT: 194 LBS | HEIGHT: 63 IN

## 2022-10-07 DIAGNOSIS — M18.11 ARTHRITIS OF CARPOMETACARPAL (CMC) JOINT OF RIGHT THUMB: Primary | ICD-10-CM

## 2022-10-07 PROCEDURE — 99213 OFFICE O/P EST LOW 20 MIN: CPT | Performed by: ORTHOPAEDIC SURGERY

## 2022-10-07 PROCEDURE — 20600 DRAIN/INJ JOINT/BURSA W/O US: CPT | Performed by: ORTHOPAEDIC SURGERY

## 2022-10-07 PROCEDURE — 1123F ACP DISCUSS/DSCN MKR DOCD: CPT | Performed by: ORTHOPAEDIC SURGERY

## 2022-10-07 NOTE — PROGRESS NOTES
Chief Complaint:   Chief Complaint   Patient presents with    Hand Pain     Right hand pain wants to do cortisone today. Selena Cary follows up because of right thumb pain. 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 She indicates pain at the base of her thumb. She has had injections there before which did help her a lot. Allergies; medications; past medical, surgical, family, and social history; and problem list have been reviewed today and updated as indicated in this encounter seen below. Exam: Range of motion of the right thumb is limited. There is pain around the [facility] level. There is also a little pain in the extensor tendons but not in the first dorsal compartment. Range of motion is good overall but associated with pain and a little crepitus. Radiographs: None    ASSESSMENT:    Dillon Mejia was seen today for hand pain. Diagnoses and all orders for this visit:    Arthritis of carpometacarpal (CMC) joint of right thumb  -     KS ARTHROCENTESIS ASPIR&/INJ SMALL JT/BURSA W/O US    Other orders  -     triamcinolone acetonide (KENALOG) injection 10 mg      PLAN: Jose Horton would like another injection in her thumb to help her get through her baking projects and then see how the symptoms go from there. Injectionof the right thumb CMC joint with Kenalog   (triamcinalone)         10mg and 1/2 cc 1/4 % bupivicainewas discussed with the patient. Discussion included but was not limited to potential risks and benefits. No contraindications to the procedure were noted. Understanding and agreement was indicated. The procedure was accomplished without incident using appropriate aseptic technique. follow up as needed      Return if symptoms worsen or fail to improve.        Current Outpatient Medications   Medication Sig Dispense Refill    ammonium lactate (LAC-HYDRIN) 12 % lotion APPLY TO RIGHT AND LEFT FOOT TO DRY SKIN AND NAILS DAILY      Misc Natural Products (ELDERBERRY ZINC/VIT C/IMMUNE MT) Take by mouth      Multiple Vitamin (MULTI-VITAMIN DAILY PO) Take by mouth      rosuvastatin (CRESTOR) 20 MG tablet Take 1 tablet by mouth daily For cholesterol 90 tablet 3    venlafaxine (EFFEXOR XR) 150 MG extended release capsule Take 1 capsule by mouth daily 90 capsule 3    pantoprazole (PROTONIX) 40 MG tablet Take 1 tablet by mouth daily 90 tablet 3    BD PEN NEEDLE SHANEL U/F 32G X 4 MM MISC Inject 1 each into the skin daily 100 each 4    ondansetron (ZOFRAN) 4 MG tablet Take 1 tablet by mouth 3 times daily as needed for Nausea or Vomiting 30 tablet 0    ONETOUCH VERIO strip TEST TWICE DAILY AND AS  NEEDED FOR SYMPTOMS OF  IRREGULAR BLOOD GLUCOSE 200 strip 3    Lancets (ONETOUCH DELICA PLUS [id number]) MISC TEST TWICE DAILY 200 each 3    ibuprofen (ADVIL;MOTRIN) 800 MG tablet Take 1 tablet by mouth every 6 hours as needed for Pain 120 tablet 1    Liraglutide (VICTOZA) 18 MG/3ML SOPN SC injection Inject 1.8 mg into the skin daily 27 pen 3    hydroCHLOROthiazide (HYDRODIURIL) 25 MG tablet TAKE 1 TABLET ONCE DAILY FOR BLOOD PRESSURE 90 tablet 3    levothyroxine (SYNTHROID) 75 MCG tablet Take 1 tablet by mouth daily 1 hour before breakfast For thyroid 90 tablet 3    glucose monitoring kit (FREESTYLE) monitoring kit 1 kit by Does not apply route daily 1 kit 0    aspirin 81 MG tablet Take 81 mg by mouth daily       No current facility-administered medications for this visit. Patient Active Problem List   Diagnosis    Type 2 diabetes mellitus without complication, without long-term current use of insulin ([facility])    Vitamin D deficiency    Abnormal EKG    Nonrheumatic aortic valve stenosis    Mixed hyperlipidemia    Severe obesity (BMI 35.0-39. 9) with comorbidity (HCC)    Leg length difference, acquired    Chronic midline low back pain with left-sided sciatica    Postoperative hypothyroidism    Gastroesophageal reflux disease without esophagitis    FHx: breast cancer in first degree relative    Family history of BRCA gene positive    Major depressive disorder with single episode, in full remission ([address]    Essential hypertension    Screening mammogram for high-risk patient    Age-related osteoporosis without current pathological fracture     Personal history of tobacco use, presenting hazards to health    Chronic right shoulder pain    Chronic hip pain, bilateral    Elevated hemoglobin (HCC)    At high risk for falls    Wrist pain, acute, left    De Quervain's tenosynovitis, left    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    Chronic pain of multiple joints    Hx of colonoscopy with polypectomy    Needs flu shot    Encounter for screening mammogram for breast cancer    Encounter for osteoporosis screening in asymptomatic postmenopausal patient       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 6/22/2022    Diabetes ([address]     Elevated red blood cell count 9/13/2019    GERD (gastroesophageal reflux disease)     Hx of colonoscopy with polypectomy 6/27/2022    Colonoscopy 9/21/21 DR Perla, moderate diverticulosis sigmoid colon, 10 mm polyp rectum hyperplastic, 16 mm polyp hepatic flexure sessile serrated adenoma, 10 mm ascending polyp tubular adenoma, sessile serrated adenoma Repeat in 3 years     Hyperlipidemia     Hypertension     Hypertension     Hypothyroidism     Neuropathy     Obesity     Severe episode of recurrent major depressive disorder, without psychotic features ([address] 2/14/2018    Tinea pedis of both feet 9/13/2019    Type 2 diabetes mellitus without complication ([address]        Past Surgical History:   Procedure Laterality Date    CHOLECYSTECTOMY      FOOT SURGERY      HEEL SPURS BOTH FEET    GALLBLADDER SURGERY  2006    HYSTERECTOMY (CERVIX STATUS UNKNOWN)  1980    TOTAL THYROIDECTOMY  2017    goiter with compressive symptoms       Allergies   Allergen Reactions    Bactrim [Sulfamethoxazole-Trimethoprim] Shortness Of Breath     N/v/aching    Atenolol      Anxiousness muscles hurt    Lipitor [Atorvastatin] Other (See Comments)     cough    Metformin And Related Diarrhea     Severe 11 - 12 times a day       Social History     Socioeconomic History    Marital status: Single     Spouse name: None    Number of children: None    Years of education: None    Highest education level: None   Tobacco Use    Smoking status: Every Day     Packs/day: 0.50     Years: 26.00     Pack years: 13.00     Types: Cigarettes     Start date: 3/20/2017    Smokeless tobacco: Never    Tobacco comments:     20 years 1 pack last 2 days only smoking 10 cigarettes a day   Vaping Use    Vaping Use: Never used   Substance and Sexual Activity    Alcohol use: No     Alcohol/week: 0.0 standard drinks    Drug use: No    Sexual activity: Yes     Social Determinants of Health     Financial Resource Strain: Low Risk     Difficulty of Paying Living Expenses: Not hard at all   Food Insecurity: No Food Insecurity    Worried About Running Out of Food in the Last Year: Never true    Ran Out of Food in the Last Year: Never true   Transportation Needs: No Transportation Needs    Lack of Transportation (Medical): No    Lack of Transportation (Non-Medical): No   Physical Activity: Insufficiently Active    Days of Exercise per Week: 1 day    Minutes of Exercise per Session: 10 min   Housing Stability: Unknown    Unable to Pay for Housing in the Last Year: No    Unstable Housing in the Last Year: No       Review of Systems  As follows except as previously noted in HPI:  Constitutional: Negative for chills, diaphoresis, fatigue, fever and unexpected weight change. Respiratory: Negative for cough, shortness of breath and wheezing. Cardiovascular: Negative for chest pain and palpitations. Neurological: Negative for dizziness, syncope, cephalgia.   GI / : negative  Musculoskeletal: see HPI       Objective: Physical Exam   Constitutional: Oriented to person, place, and time. and appears well-developed and well-nourished. :   Head: Normocephalic and atraumatic. Eyes: EOM are normal.   Neck: Neck supple. Cardiovascular: Normal rate and regular rhythm. Pulmonary/Chest: Effort normal. No stridor. No respiratory distress, no wheezes. Abdominal:  No abnormal distension. Neurological: Alert and oriented to person, place, and time. Skin: Skin is warm and dry. Psychiatric: Normal mood and affect.  Behavior is normal. Thought content normal.    DIGNA Soliman DO    10/7/22  10:02 AM

## 2022-10-18 ENCOUNTER — PATIENT MESSAGE (OUTPATIENT)
Dept: FAMILY MEDICINE CLINIC | Age: 67
End: 2022-10-18

## 2022-10-18 DIAGNOSIS — K21.00 GASTROESOPHAGEAL REFLUX DISEASE WITH ESOPHAGITIS WITHOUT HEMORRHAGE: ICD-10-CM

## 2022-10-18 DIAGNOSIS — F32.5 MAJOR DEPRESSIVE DISORDER WITH SINGLE EPISODE, IN FULL REMISSION (HCC): ICD-10-CM

## 2022-10-18 DIAGNOSIS — E11.9 TYPE 2 DIABETES MELLITUS WITHOUT COMPLICATION, WITHOUT LONG-TERM CURRENT USE OF INSULIN (HCC): ICD-10-CM

## 2022-10-18 DIAGNOSIS — E89.0 POSTOPERATIVE HYPOTHYROIDISM: ICD-10-CM

## 2022-10-18 DIAGNOSIS — R11.2 NAUSEA AND VOMITING IN ADULT: ICD-10-CM

## 2022-10-18 DIAGNOSIS — I10 ESSENTIAL HYPERTENSION: ICD-10-CM

## 2022-10-18 DIAGNOSIS — E78.2 MIXED HYPERLIPIDEMIA: ICD-10-CM

## 2022-10-18 RX ORDER — BLOOD SUGAR DIAGNOSTIC
STRIP MISCELLANEOUS
Qty: 200 STRIP | Refills: 3 | Status: SHIPPED | OUTPATIENT
Start: 2022-10-18

## 2022-10-18 RX ORDER — IBUPROFEN 800 MG/1
800 TABLET ORAL EVERY 6 HOURS PRN
Qty: 120 TABLET | Refills: 1 | Status: SHIPPED | OUTPATIENT
Start: 2022-10-18

## 2022-10-18 RX ORDER — LANCETS 30 GAUGE
100 EACH MISCELLANEOUS DAILY
Qty: 200 EACH | Refills: 3 | Status: SHIPPED | OUTPATIENT
Start: 2022-10-18

## 2022-10-18 RX ORDER — LEVOTHYROXINE SODIUM 0.07 MG/1
75 TABLET ORAL DAILY
Qty: 90 TABLET | Refills: 3 | Status: SHIPPED | OUTPATIENT
Start: 2022-10-18

## 2022-10-18 RX ORDER — LIRAGLUTIDE 6 MG/ML
1.8 INJECTION SUBCUTANEOUS DAILY
Qty: 9 ML | Refills: 11 | Status: SHIPPED | OUTPATIENT
Start: 2022-10-18 | End: 2023-10-19

## 2022-10-18 RX ORDER — PANTOPRAZOLE SODIUM 40 MG/1
40 TABLET, DELAYED RELEASE ORAL DAILY
Qty: 90 TABLET | Refills: 3 | Status: CANCELLED | OUTPATIENT
Start: 2022-10-18

## 2022-10-18 RX ORDER — PEN NEEDLE, DIABETIC 32GX 5/32"
1 NEEDLE, DISPOSABLE MISCELLANEOUS DAILY
Qty: 100 EACH | Refills: 4 | Status: SHIPPED | OUTPATIENT
Start: 2022-10-18

## 2022-10-18 RX ORDER — ROSUVASTATIN CALCIUM 20 MG/1
20 TABLET, COATED ORAL DAILY
Qty: 90 TABLET | Refills: 3 | Status: CANCELLED | OUTPATIENT
Start: 2022-10-18

## 2022-10-18 RX ORDER — ONDANSETRON 4 MG/1
4 TABLET, FILM COATED ORAL 3 TIMES DAILY PRN
Qty: 30 TABLET | Refills: 0 | Status: SHIPPED | OUTPATIENT
Start: 2022-10-18

## 2022-10-18 RX ORDER — VENLAFAXINE HYDROCHLORIDE 150 MG/1
150 CAPSULE, EXTENDED RELEASE ORAL DAILY
Qty: 90 CAPSULE | Refills: 3 | Status: CANCELLED | OUTPATIENT
Start: 2022-10-18

## 2022-10-18 RX ORDER — HYDROCHLOROTHIAZIDE 25 MG/1
TABLET ORAL
Qty: 90 TABLET | Refills: 3 | Status: SHIPPED | OUTPATIENT
Start: 2022-10-18

## 2022-10-24 RX ORDER — PANTOPRAZOLE SODIUM 40 MG/1
40 TABLET, DELAYED RELEASE ORAL DAILY
Qty: 90 TABLET | Refills: 3 | Status: SHIPPED | OUTPATIENT
Start: 2022-10-24

## 2022-10-24 RX ORDER — ROSUVASTATIN CALCIUM 20 MG/1
20 TABLET, COATED ORAL DAILY
Qty: 90 TABLET | Refills: 3 | Status: SHIPPED
Start: 2022-10-24 | End: 2022-11-03 | Stop reason: SDUPTHER

## 2022-10-24 RX ORDER — VENLAFAXINE HYDROCHLORIDE 150 MG/1
150 CAPSULE, EXTENDED RELEASE ORAL DAILY
Qty: 90 CAPSULE | Refills: 3 | Status: SHIPPED | OUTPATIENT
Start: 2022-10-24

## 2022-10-24 NOTE — TELEPHONE ENCOUNTER
Medications went to the wrong pharmacy, they need to go to [facility].       Last Appointment   9/29/2022  Next Appointment  11/3/2022

## 2022-10-24 NOTE — TELEPHONE ENCOUNTER
From: Tressa Burnett  Sent: 10/22/2022 5:40 PM EDT  To: Angy Dang Clinical Staff  Subject: Med refill    Lucho Reed , I recieved everything but pantoprazole 40mg. , rosuvaststin 20mg. , venlafaxine 150 mg.  I thank you for being so good to me. Sandee rodriguez said they didn't recieve orders for those three meds. ..

## 2022-10-25 ENCOUNTER — TELEPHONE (OUTPATIENT)
Dept: CASE MANAGEMENT | Age: 67
End: 2022-10-25

## 2022-10-25 NOTE — TELEPHONE ENCOUNTER
I called the patient and she confirmed her CT lung screening at [facility] on 10/26/2022 at 1:30 pm.  I reminded the patient to arrive at 1:00 pm, enter through the main entrance, and register. Patient confirmed.           Electronically signed by Masha Chris on 10/25/22 at 4:49 PM EDT

## 2022-10-26 ENCOUNTER — HOSPITAL ENCOUNTER (OUTPATIENT)
Dept: CT IMAGING | Age: 67
Discharge: HOME | End: 2022-10-26
Payer: MEDICARE

## 2022-10-26 ENCOUNTER — HOSPITAL ENCOUNTER (OUTPATIENT)
Dept: MAMMOGRAPHY | Age: 67
Discharge: HOME | End: 2022-10-28
Payer: MEDICARE

## 2022-10-26 DIAGNOSIS — Z13.820 ENCOUNTER FOR OSTEOPOROSIS SCREENING IN ASYMPTOMATIC POSTMENOPAUSAL PATIENT: ICD-10-CM

## 2022-10-26 DIAGNOSIS — Z78.0 ENCOUNTER FOR OSTEOPOROSIS SCREENING IN ASYMPTOMATIC POSTMENOPAUSAL PATIENT: ICD-10-CM

## 2022-10-26 DIAGNOSIS — Z87.891 PERSONAL HISTORY OF TOBACCO USE: ICD-10-CM

## 2022-10-26 PROCEDURE — 77080 DXA BONE DENSITY AXIAL: CPT

## 2022-10-26 PROCEDURE — 71271 CT THORAX LUNG CANCER SCR C-: CPT

## 2022-10-28 ENCOUNTER — TELEPHONE (OUTPATIENT)
Dept: CASE MANAGEMENT | Age: 67
End: 2022-10-28

## 2022-10-28 ENCOUNTER — HOSPITAL ENCOUNTER (OUTPATIENT)
Dept: MAMMOGRAPHY | Age: 67
Discharge: HOME | End: 2022-10-30
Payer: MEDICARE

## 2022-10-28 VITALS — WEIGHT: 194 LBS | BODY MASS INDEX: 34.38 KG/M2 | HEIGHT: 63 IN

## 2022-10-28 DIAGNOSIS — Z12.31 ENCOUNTER FOR SCREENING MAMMOGRAM FOR BREAST CANCER: ICD-10-CM

## 2022-10-28 PROCEDURE — 77067 SCR MAMMO BI INCL CAD: CPT

## 2022-10-28 NOTE — TELEPHONE ENCOUNTER
No call, encounter opened to process CT Lung Screening. CT Lung Screen: 10/26/2022    Impression   1. There is no pulmonary infiltrate, mass or suspicious pulmonary nodule. 2. Mild emphysematous changes   3. Significant calcified plaque within the coronary arteries. LUNG RADS:   Per ACR Lung-RADS Version 1.1       Category 1, Negative (No nodules and definitely benign nodules). Management: Continue annual lung screening with LDCT in 12 months. RECOMMENDATIONS:   If you would like to [facility] your patient with the [facility], please contact the Nurse Navigator at   [phone]. Pack years: 23.5    Social History     Tobacco Use  Smoking Status: Current Every Day Smoker    Start Date: 63   Quit Date:    Types: Cigarettes   Packs/Day: 0.5   Years: 52   Pack Years: 23.5   Smokeless Tobacco: Never         Results letter sent to patient via my chart or mailed.      [facility]

## 2022-10-29 PROBLEM — Z12.31 ENCOUNTER FOR SCREENING MAMMOGRAM FOR BREAST CANCER: Status: RESOLVED | Noted: 2022-09-29 | Resolved: 2022-10-29

## 2022-11-03 ENCOUNTER — OFFICE VISIT (OUTPATIENT)
Dept: FAMILY MEDICINE CLINIC | Age: 67
End: 2022-11-03
Payer: MEDICARE

## 2022-11-03 VITALS
TEMPERATURE: 97.1 F | SYSTOLIC BLOOD PRESSURE: 136 MMHG | BODY MASS INDEX: 35.08 KG/M2 | HEIGHT: 63 IN | OXYGEN SATURATION: 100 % | HEART RATE: 56 BPM | WEIGHT: 198 LBS | RESPIRATION RATE: 16 BRPM | DIASTOLIC BLOOD PRESSURE: 78 MMHG

## 2022-11-03 DIAGNOSIS — J43.9 PULMONARY EMPHYSEMA, UNSPECIFIED EMPHYSEMA TYPE (HCC): ICD-10-CM

## 2022-11-03 DIAGNOSIS — E78.2 MIXED HYPERLIPIDEMIA: ICD-10-CM

## 2022-11-03 DIAGNOSIS — M81.0 AGE-RELATED OSTEOPOROSIS WITHOUT CURRENT PATHOLOGICAL FRACTURE: ICD-10-CM

## 2022-11-03 DIAGNOSIS — E89.0 POSTOPERATIVE HYPOTHYROIDISM: ICD-10-CM

## 2022-11-03 DIAGNOSIS — Z13.31 POSITIVE DEPRESSION SCREENING: ICD-10-CM

## 2022-11-03 DIAGNOSIS — F33.1 MODERATE EPISODE OF RECURRENT MAJOR DEPRESSIVE DISORDER (HCC): ICD-10-CM

## 2022-11-03 DIAGNOSIS — E11.9 TYPE 2 DIABETES MELLITUS WITHOUT COMPLICATION, WITHOUT LONG-TERM CURRENT USE OF INSULIN (HCC): Primary | ICD-10-CM

## 2022-11-03 LAB
CHP ED QC CHECK: ABNORMAL
GLUCOSE BLD-MCNC: 143 MG/DL
HBA1C MFR BLD: 7.2 %

## 2022-11-03 PROCEDURE — 3051F HG A1C>EQUAL 7.0%<8.0%: CPT | Performed by: NURSE PRACTITIONER

## 2022-11-03 PROCEDURE — 3074F SYST BP LT 130 MM HG: CPT | Performed by: NURSE PRACTITIONER

## 2022-11-03 PROCEDURE — 3078F DIAST BP <80 MM HG: CPT | Performed by: NURSE PRACTITIONER

## 2022-11-03 PROCEDURE — 83036 HEMOGLOBIN GLYCOSYLATED A1C: CPT | Performed by: NURSE PRACTITIONER

## 2022-11-03 PROCEDURE — 1123F ACP DISCUSS/DSCN MKR DOCD: CPT | Performed by: NURSE PRACTITIONER

## 2022-11-03 PROCEDURE — 99214 OFFICE O/P EST MOD 30 MIN: CPT | Performed by: NURSE PRACTITIONER

## 2022-11-03 PROCEDURE — 82962 GLUCOSE BLOOD TEST: CPT | Performed by: NURSE PRACTITIONER

## 2022-11-03 RX ORDER — ROSUVASTATIN CALCIUM 40 MG/1
40 TABLET, COATED ORAL NIGHTLY
Qty: 90 TABLET | Refills: 1 | Status: SHIPPED | OUTPATIENT
Start: 2022-11-03

## 2022-11-03 RX ORDER — ALENDRONATE SODIUM 70 MG/1
70 TABLET ORAL
Qty: 12 TABLET | Refills: 3 | Status: SHIPPED | OUTPATIENT
Start: 2022-11-03

## 2022-11-03 SDOH — HEALTH STABILITY: PHYSICAL HEALTH: ON AVERAGE, HOW MANY MINUTES DO YOU ENGAGE IN EXERCISE AT THIS LEVEL?: 30 MIN

## 2022-11-03 SDOH — HEALTH STABILITY: PHYSICAL HEALTH: ON AVERAGE, HOW MANY DAYS PER WEEK DO YOU ENGAGE IN MODERATE TO STRENUOUS EXERCISE (LIKE A BRISK WALK)?: 1 DAY

## 2022-11-03 ASSESSMENT — ENCOUNTER SYMPTOMS
COUGH: 0
ABDOMINAL PAIN: 0
VOICE CHANGE: 0
NAUSEA: 0
CHEST TIGHTNESS: 0
TROUBLE SWALLOWING: 0
DIARRHEA: 0
CONSTIPATION: 0
WHEEZING: 0
FACIAL SWELLING: 0
VOMITING: 0
SHORTNESS OF BREATH: 0
COLOR CHANGE: 0
RHINORRHEA: 0
SINUS PRESSURE: 0
SINUS PAIN: 0
BACK PAIN: 0
SORE THROAT: 0

## 2022-11-03 ASSESSMENT — PATIENT HEALTH QUESTIONNAIRE - PHQ9
8. MOVING OR SPEAKING SO SLOWLY THAT OTHER PEOPLE COULD HAVE NOTICED. OR THE OPPOSITE, BEING SO FIGETY OR RESTLESS THAT YOU HAVE BEEN MOVING AROUND A LOT MORE THAN USUAL: 0
SUM OF ALL RESPONSES TO PHQ QUESTIONS 1-9: 12
3. TROUBLE FALLING OR STAYING ASLEEP: 3
4. FEELING TIRED OR HAVING LITTLE ENERGY: 2
7. TROUBLE CONCENTRATING ON THINGS, SUCH AS READING THE NEWSPAPER OR WATCHING TELEVISION: 1
9. THOUGHTS THAT YOU WOULD BE BETTER OFF DEAD, OR OF HURTING YOURSELF: 0
10. IF YOU CHECKED OFF ANY PROBLEMS, HOW DIFFICULT HAVE THESE PROBLEMS MADE IT FOR YOU TO DO YOUR WORK, TAKE CARE OF THINGS AT HOME, OR GET ALONG WITH OTHER PEOPLE: 2
2. FEELING DOWN, DEPRESSED OR HOPELESS: 1
6. FEELING BAD ABOUT YOURSELF - OR THAT YOU ARE A FAILURE OR HAVE LET YOURSELF OR YOUR FAMILY DOWN: 1
SUM OF ALL RESPONSES TO PHQ9 QUESTIONS 1 & 2: 2
SUM OF ALL RESPONSES TO PHQ QUESTIONS 1-9: 12
1. LITTLE INTEREST OR PLEASURE IN DOING THINGS: 1
5. POOR APPETITE OR OVEREATING: 3
SUM OF ALL RESPONSES TO PHQ QUESTIONS 1-9: 12
SUM OF ALL RESPONSES TO PHQ QUESTIONS 1-9: 12

## 2022-11-03 ASSESSMENT — COLUMBIA-SUICIDE SEVERITY RATING SCALE - C-SSRS
2. HAVE YOU ACTUALLY HAD ANY THOUGHTS OF KILLING YOURSELF?: NO
6. HAVE YOU EVER DONE ANYTHING, STARTED TO DO ANYTHING, OR PREPARED TO DO ANYTHING TO END YOUR LIFE?: NO
1. WITHIN THE PAST MONTH, HAVE YOU WISHED YOU WERE DEAD OR WISHED YOU COULD GO TO SLEEP AND NOT WAKE UP?: NO

## 2022-11-03 ASSESSMENT — LIFESTYLE VARIABLES
HOW MANY STANDARD DRINKS CONTAINING ALCOHOL DO YOU HAVE ON A TYPICAL DAY: 0
HOW OFTEN DO YOU HAVE A DRINK CONTAINING ALCOHOL: 1
HOW OFTEN DO YOU HAVE A DRINK CONTAINING ALCOHOL: NEVER
HOW MANY STANDARD DRINKS CONTAINING ALCOHOL DO YOU HAVE ON A TYPICAL DAY: PATIENT DOES NOT DRINK
HOW OFTEN DO YOU HAVE SIX OR MORE DRINKS ON ONE OCCASION: 1

## 2022-11-03 NOTE — ASSESSMENT & PLAN NOTE
borderline controlled up from last visit to 7.2% continue Victoza 1.8 mg daily start Jardiance 25 mg daily. Monitor BS at different times: 1 day fasting, then next day 2 hours after lunch, next day 2 hours after dinner, next day at bedtime then start over and log all values.   Bring log to next appointment  Foot exam every day; wash and dry well between toes, look for any redness, cracks, wounds notify provider if any problems occur  Reminder for annual Eye exam  Reminder for Podiatry visits Q 2 Months for toenail care if needed  Reminder to keep vaccines up dated  Exercise 30 minutes daily  Recommend Diabetic Education Classes if you have not already attended

## 2022-11-03 NOTE — ASSESSMENT & PLAN NOTE
stable until 2 weeks ago, she is going to [place] to visit with her sister for a week and says her spirits are improving. She denies any suicidal or homicidal ideations. She declines to change medication will see how she does when she returns from vacation.

## 2022-11-03 NOTE — ASSESSMENT & PLAN NOTE
CT chest reviewed significant coronary artery calcification. Increase Crestor 40 mg nightly and recheck labs in 3 months.  Work on weight loss, daily exercise and stop SMOKING as she is high risk for MI

## 2022-11-03 NOTE — PROGRESS NOTES
OFFICE PROGRESS NOTE  [address]  [address] [id number]  Dept: [phone]   Chief Complaint   Patient presents with    Diabetes    Hyperlipidemia       ASSESSMENT/PLAN   1. Type 2 diabetes mellitus without complication, without long-term current use of insulin ([facility])  Assessment & Plan:   borderline controlled up from last visit to 7.2% continue Victoza 1.8 mg daily start Jardiance 25 mg daily. Monitor BS at different times: 1 day fasting, then next day 2 hours after lunch, next day 2 hours after dinner, next day at bedtime then start over and log all values. Bring log to next appointment  Foot exam every day; wash and dry well between toes, look for any redness, cracks, wounds notify provider if any problems occur  Reminder for annual Eye exam  Reminder for Podiatry visits Q 2 Months for toenail care if needed  Reminder to keep vaccines up dated  Exercise 30 minutes daily  Recommend Diabetic Education Classes if you have not already attended  Orders:  -     POCT glycosylated hemoglobin (Hb A1C)  -     POCT Glucose  -     empagliflozin (JARDIANCE) 25 MG tablet; Take 1 tablet by mouth daily, Disp-90 tablet, R-1Normal  -     CBC with Auto Differential; Future  2. Mixed hyperlipidemia  Assessment & Plan:   CT chest reviewed significant coronary artery calcification. Increase Crestor 40 mg nightly and recheck labs in 3 months. Work on weight loss, daily exercise and stop SMOKING as she is high risk for MI    Orders:  -     rosuvastatin (CRESTOR) 40 MG tablet; Take 1 tablet by mouth at bedtime For cholesterol, note increase in dose discontinue crestor 20 mg, Disp-90 tablet, R-1Requesting 1 year supplyNormal  -     Comprehensive Metabolic Panel; Future  -     Lipid Panel; Future  3. Positive depression screening  4.  Pulmonary emphysema, unspecified emphysema type ([facility]  Assessment & Plan:   CT scan reviewed and slowly worsening agrees to try Anoor 1 puff daily, rinse mouth well after each use. Orders:  -     umeclidinium-vilanterol (ANORO ELLIPTA) 62.5-25 MCG/INH AEPB inhaler; Inhale 1 puff into the lungs daily Rinse mouth after each use, Disp-1 each, R-4Normal  5. Age-related osteoporosis without current pathological fracture  -     alendronate (FOSAMAX) 70 MG tablet; Take 1 tablet by mouth every 7 days Take on empty stomach with full glass of water and sit up for 30 minutes, no other medications at the same time, Disp-12 tablet, R-3Normal  6. Moderate episode of recurrent major depressive disorder [facility])  Assessment & Plan:   stable until 2 weeks ago, she is going to [place] to visit with her sister for a week and says her spirits are improving. She denies any suicidal or homicidal ideations. She declines to change medication will see how she does when she returns from vacation. 7. Postoperative hypothyroidism  -     TSH; Future  -     T4, Free; Future       Reviewed labs:  Lipids,   Reviewed radiology CT Chest, mammogram, DEXA    Discussed reducing caffeine intake, \"smoking cessation,  weight loss,  exercising 30 minutes daily, and taking medications as directed and adverse effects    Return in about 3 months (around 2/3/2023) for DM, KARSON, hyperlipidemia, HTN.     HPI:   SUBJECTIVE:     74 y.o. female presents today for follow-up of diabetes dcus. In-office bloodsugar is:   Results for POC orders placed in visit on 11/03/22   POCT glycosylated hemoglobin (Hb A1C)   Result Value Ref Range    Hemoglobin A1C 7.2 %   POCT Glucose   Result Value Ref Range    Glucose 143 mg/dL    QC OK? Patient reports being compliant to low carbohydrate diet and increasing weekly exercises. Currently, the patient is treated with medication(s): victoza 1.8 mg daily. Patient reports checking home blood sugar 1 times per day. Patient states home blood sugar ranges from 101 - 174.  Patient denies hypoglycemic episodes and understand to take sweetened beverages or a snack if hypoglycemic symptoms are suspected. Hyperlipidemia: Patient presents with hyperlipidemia. She was tested because DM, HTN, HLD. Her last labs 6/20/22  showed Total cholesterol of 164, HDL 47, LDL 77,  Triglycerides 198. Denies chest pain, dyspnea, exertional chest pressure/discomfort, fatigue, feeding intolerance, lower extremity edema, palpitations, poor exercise tolerance, syncope, tachypnea, and skin xanthelasma. There is a family history of hyperlipidemia. There is not a family history of early ischemia heart disease. Review CT chest indicates changes in her emphysema and significant plaque in coronary arteries. She denies any symptoms of SOB will have cough on occasion. No limitations in her activity. No ER or UC visits. Does agree to start Anoro today. Discussed to stop smoking    EXAMINATION:   LOW DOSE SCREENING CT OF THE CHEST WITHOUT CONTRAST       10/26/2022 1:16 pm       TECHNIQUE:   Low dose lung cancer screening CT of the chest was performed without the   administration of intravenous contrast.  Multiplanar reformatted images    are   provided for review. Automated exposure control, iterative reconstruction,   and/or weight based adjustment of the mA/kV was utilized to reduce the   radiation dose to as low as reasonably achievable. COMPARISON:   None. HISTORY:   ORDERING SYSTEM PROVIDED HISTORY: Personal history of tobacco use   TECHNOLOGIST PROVIDED HISTORY:   Age: Patient is 74 y.o.        Smoking History: Social History   Tobacco Use   Smoking status: Every Day   Packs/day: 0.50   Years: 26.00   Pack years: 13   Types: Cigarettes   Start date: 3/20/2017   Smokeless tobacco: Never   Tobacco comments: 20 years 1 pack last 2 days only smoking 10 cigarettes a    day   Vaping Use   Vaping Use: Never used   Alcohol use: No   Alcohol/week: 0.0 standard drinks   Drug use: No   Pack years: 13       Date of last lung cancer screening: No previous lung cancer screening exam Is there documentation of shared decision making? ->Yes   Is this a low dose CT or a routine CT?->Low Dose CT   Is this the first (baseline) CT or an annual exam?->Baseline   Does the patient show any signs or symptoms of lung cancer? ->No   Smoking Status? ->Every Day   Smoking packs per day?->.5   Years smoking?->26       FINDINGS:   Mediastinum:  The thoracic aorta is normal caliber. There is no    pericardial   effusion. No mediastinal or hilar lymphadenopathy is identified. There is calcified plaque seen within the coronary arteries. Lungs/Pleura: There are emphysematous changes. There is no pulmonary   infiltrate, mass or suspicious pulmonary nodule. There is no pleural   thickening or pleural effusion. Upper Abdomen:   Limited images of the upper abdomen demonstrate no acute   abnormality. Soft Tissues/Bones:  Age related degenerative changes of the visualized   osseous structures without focal destructive lesion. Impression   1. There is no pulmonary infiltrate, mass or suspicious pulmonary nodule. 2. Mild emphysematous changes   3. Significant calcified plaque within the coronary arteries. Bone density osteoporosis    Depression had been stable until 2 weeks ago, she is going to [place] to visit with her sister for a week and says her spirits are improving. She denies any suicidal or homicidal ideations. She declines to change medication will see how she does when she returns from vacation. Surgical History:  has a past surgical history that includes Gallbladder surgery (2006); Hysterectomy (1980); Foot surgery; Cholecystectomy; and Total Thyroidectomy (2017). Social History:  reports that she has been smoking cigarettes. She started smoking about 47 years ago. She has a 23.50 pack-year smoking history. She has never used smokeless tobacco. She reports that she does not drink alcohol and does not use drugs.   Family History: family history includes Diabetes in her father and mother; Heart Disease in her mother; High Cholesterol in her father and mother. I have reviewed Selena's allergies, medications, problem list, medical, social and family history and have updated as needed in the electronic medical record    Review of Systems   Constitutional:  Negative for activity change, appetite change, chills, diaphoresis, fatigue, fever and unexpected weight change. HENT:  Negative for congestion, dental problem, drooling, ear discharge, ear pain, facial swelling, hearing loss, mouth sores, nosebleeds, postnasal drip, rhinorrhea, sinus pressure, sinus pain, sneezing, sore throat, tinnitus, trouble swallowing and voice change. Eyes:  Negative for visual disturbance. Respiratory:  Negative for cough, chest tightness, shortness of breath and wheezing. Cardiovascular:  Negative for chest pain, palpitations and leg swelling. Gastrointestinal:  Negative for abdominal pain, constipation, diarrhea, nausea and vomiting. Endocrine: Negative for cold intolerance, heat intolerance, polydipsia, polyphagia and polyuria. Genitourinary:  Negative for difficulty urinating, frequency and urgency. Musculoskeletal:  Negative for arthralgias, back pain, gait problem, joint swelling, myalgias, neck pain and neck stiffness. Skin:  Negative for color change, pallor, rash and wound. Allergic/Immunologic: Negative for environmental allergies, food allergies and immunocompromised state. Neurological:  Negative for dizziness, tremors, seizures, syncope, facial asymmetry, speech difficulty, weakness, light-headedness, numbness and headaches. Hematological:  Negative for adenopathy. Does not bruise/bleed easily. Psychiatric/Behavioral:  Positive for agitation, dysphoric mood and sleep disturbance. Negative for behavioral problems, confusion, decreased concentration, hallucinations, self-injury and suicidal ideas. The patient is not nervous/anxious and is not hyperactive. OBJECTIVE:     VS:  Wt Readings from Last 3 Encounters:   11/03/22 198 lb (89.8 kg)   10/28/22 194 lb (88 kg)   10/07/22 194 lb (88 kg)                       Vitals:    11/03/22 1009   BP: 136/78   Pulse: 56   Resp: 16   Temp: 97.1 °F (36.2 °C)   SpO2: 100%   Weight: 198 lb (89.8 kg)   Height: 5' 3\" (1.6 m)       General: Alert and oriented to person, place, and time, well developed and well nourished, obese, in no acute distress  SKIN: Warm and dry, intact without any rash, masses or lesions  HEAD: normocephalic, atraumatic  Eyes: sclera/conjunctiva clear, PERRLA, EOMI's intact  ENT: tympanic membranes, external ear and ear canal normal bilaterally, normal hearing, Nose without deformity, nasal mucosa and turbinates normal without polyps   Throat: clear, tongue midline, tonsils 2+, drainage, no masses or lesions noted, good dentition  Neck: supple and non-tender without mass, trachea midline, no cervical lymphadenopathy, no bruit, no thyromegaly or nodules  Cardiovascular: regular rate and regular rhythm, normal S1 and S2, 1/6 systolic at 2nd right intercostal space, throughout the precordium, radiates to axilla, no  rubs, clicks, or gallop. Distal pulses intact, bilateral carotid bruits. No edema  Pulmonary/Chest: clear to auscultation bilaterally, no wheezes, rales or rhonchi, normal air movement, no respiratory distress  Abdomen: soft, non-tender, non-distended, normal bowel sounds, no masses or hepatosplenomegaly  Musculoskeletal: Normal ROM, no joint swelling, deformity or tenderness   Neurologic:  gait, coordination and speech normal  Extremities: no clubbing, cyanosis, or edema. Psychiatric: Good eye contact, normal mood and affect, answers questions appropriately    I have reviewed my findings and recommendations with Doris Robbins.     Sumi Brewer, APRN - CNP, NP-C, FNP-BC

## 2022-11-03 NOTE — ASSESSMENT & PLAN NOTE
CT scan reviewed and slowly worsening agrees to try Anoor 1 puff daily, rinse mouth well after each use.

## 2022-12-07 ENCOUNTER — HOSPITAL ENCOUNTER (OUTPATIENT)
Dept: DIABETES SERVICES | Age: 67
Setting detail: THERAPIES SERIES
Discharge: HOME | End: 2022-12-07
Payer: MEDICARE

## 2022-12-07 PROCEDURE — G0109 DIAB MANAGE TRN IND/GROUP: HCPCS

## 2022-12-07 NOTE — PROGRESS NOTES
Diabetes Self-Management Education Record    Participant Name: Sheila Milligan  Referring Provider: COLE Bedoya CNP  Assessment/Evaluation Ratings:  1=Needs Instruction   4=Demonstrates Understanding/Competency  2=Needs Review   NC=Not Covered    3=Comprehends Key Points  N/A=Not Applicable  Topics/Learning Objectives Pre-session Assess Date:  Instructor initials/date  12/7/22 PC Instr. Date  12/7/22 PC  Instructor initials/date Follow-up Post- session Eval Comments   Diabetes disease process & Treatment process:   -Define type of diabetes in simple terms.  - Describe the ABCs of  diabetes management  -Identify own type of diabetes  -Identify lifestyle changes/treatment options  -other:  2 [x] All     []  []  []  []  []  3 12/7/22 PC  Hx of Type 2 DM since 2017  No prior education  Family hx (Attending with her daughter)   Developing strategies for Healthy coping/psychosocial issues:    -Describe feelings about living with diabetes  -Identify coping strategies and sources of stress  -Identify support needed & support network available  -Complete PAID-5 Diabetes questionnaire 1 [x] All     []  []  []    []  3 Date:12/7/22   PAID-5 Score: did not fill out  Confidence Score: 5           Prevention, detection & treatment of Chronic complications:    -Identify the prevention, detection and treatment for complications including immunizations, preventive eye, foot, dental and renal exams as indicated per the participant's duration of diabetes and health status.  -Define the natural course of diabetes and the relationship of blood glucose levels to long term complications of diabetes.   2 [x] All     []            []  3 12/7/22 PC  Hypertension    hyperlipidemia   Prevention, detection & treatment of acute complications:    -State the causes,signs & symptoms of hyper & hypoglycemia, and prevention & treatment strategies.   -Describe sick day guidelines  DKA /indications for ketone testing &  when to call physician 2 [x] All     []      []    3 12/7/22 PC  Hx of high and low BG. Knows signs and appropriate Rx.             -Identify severe weather/situation crisis  & diabetes supplies management  []      Using medications safely:   -State effects of diabetes medicines on blood glucose levels;  -List diabetes medication taken, action & side effects 2 [x] All     []  []  3 12/7/22 PC  Victoza 1.8 mg daily (taking at bedtime, to call the pharmacist and clarify)  Jardiance 25 mg in AM    Insulin/Injectables/glucagon  -Name appropriate injection sites; proper storage; supplies needed;  2   []   12/7/22 PC  see above Victoza    Demonstrate proper technique  []      Monitoring blood glucose, interpreting and using results:   -Identify the purpose of testing   -Identify recommended & personal blood glucose targets & HgbA1C target levels  -State the Importance of logging blood glucose levels for pattern recognition;   -State benefits of reading/using pt generated health data  -Verbalize safe lancet disposal 2 [x] All     []  []    []  []  []  3 12/7/22 PC  Testing daily  Ac range  mg/dl    A1C 7.1%   -Demonstrate proper testing technique  []      Incorporating physical activity into lifestyle:   -State effect of exercise on blood glucose levels;   -State benefits of regular exercise;   -Define safety considerations/food choices if needed.  -Describe contraindications/maintenance of activity.  2 [x] All     []  []    []  []  3 12/7/22 PC  Housework as needed at home   Incorporating nutritional management into lifestyle:   -Describe effect of type, amount & timing of food on blood glucose  -Describe methods for preparing and planning   healthy meals  -Correctly read food labels for nutritional values  -Name 3 foods high in Carbohydrate  -Plan a sample 4 carbohydrate-controlled meal using Diabetes Plate Method  -Verbalized ability to measure and count carbohydrate gram servings using food labels and carbohydrate food list.    -Plan a carbohydrate-controlled meal based on individual needs/preferences from a Registered Dietitian.   1 [] All       []    []    []  []      []        []                      Developing strategies for problem solving to promote health/change behavior. -Identify 7 self-care behaviors; Personal health risk factors; Benefits, challenges & strategies for behavioral change and set an individualized goal selection. 1       []      []Nutrition  []Monitoring  []Exercise  []Medication  []Other     Identified Barriers to learning/adherence to self management plan:    None  []  other    Instruction Method:  Lecture/Discussion, Power Point Presentation , Handouts, and Return demonstration     Supporting Education Materials/Equipment Provided: Self-management manual and Nutritional Packet   []Filipino materials       [] services     []Other:      Encounter Type Date Attended Start Time End Time Comments No Show Dates   Assessment 12/7/22 PC         Session 1 12/7/22 PC 0900 1130  In person    Session 2        1:1 DSMES          In person Follow-up         Gestational Diabetes         DSMES #3        Meter Instrx        Insulin Instrx           Additional Comments: [] Pt seen individually due to Covid-19 Safety precautions and no group session available.         Date:   Follow-up goal attainment based on patients initial DSMES goal    Dr Notified by [] EMR []Fax        []Post class Hgb A1C  []Medication compliance   []Plate method/meal plan compliance   []Able to state the number of Carbohydrate servings eaten at B,L,D   []Testing blood glucose as prescribed by PCP   []Exercise Routine   []Other:   []Other:     []Patient lost to follow-up   Notified by []EMR []Fax

## 2022-12-08 ENCOUNTER — HOSPITAL ENCOUNTER (OUTPATIENT)
Dept: DIABETES SERVICES | Age: 67
Setting detail: THERAPIES SERIES
Discharge: HOME | End: 2022-12-08
Payer: MEDICARE

## 2022-12-08 PROCEDURE — G0109 DIAB MANAGE TRN IND/GROUP: HCPCS

## 2022-12-08 NOTE — PROGRESS NOTES
Diabetes Self-Management Education Record    Participant Name: Huma Gray  Referring Provider: COLE Calle CNP  Assessment/Evaluation Ratings:  1=Needs Instruction   4=Demonstrates Understanding/Competency  2=Needs Review   NC=Not Covered    3=Comprehends Key Points  N/A=Not Applicable  Topics/Learning Objectives Pre-session Assess Date:  Instructor initials/date  12/7/22 PC Instr. Date  12/7/22 PC  Instructor initials/date Follow-up Post- session Eval Comments   Diabetes disease process & Treatment process:   -Define type of diabetes in simple terms.  - Describe the ABCs of  diabetes management  -Identify own type of diabetes  -Identify lifestyle changes/treatment options  -other:  2 [x] All     []  []  []  []  []  3 12/7/22 PC  Hx of type 2 DM for 5 yrs  No prior education  Family hx   Developing strategies for Healthy coping/psychosocial issues:    -Describe feelings about living with diabetes  -Identify coping strategies and sources of stress  -Identify support needed & support network available  -Complete PAID-5 Diabetes questionnaire 2 [x] All     []  []  []    []  3 Date:12/7/22   PAID-5 Score:did not fill out  Confidence Score: 5           Prevention, detection & treatment of Chronic complications:    -Identify the prevention, detection and treatment for complications including immunizations, preventive eye, foot, dental and renal exams as indicated per the participant's duration of diabetes and health status.  -Define the natural course of diabetes and the relationship of blood glucose levels to long term complications of diabetes.   2 [x] All     []            []  3 12/7/22 PC  Hyperlipidemia  Hypertension   Prevention, detection & treatment of acute complications:    -State the causes,signs & symptoms of hyper & hypoglycemia, and prevention & treatment strategies.   -Describe sick day guidelines  DKA /indications for ketone testing &  when to call physician  2 [x] All     []      []    3 12/7/22 PC  Stated both lows and highs and does recognize them           -Identify severe weather/situation crisis  & diabetes supplies management  []      Using medications safely:   -State effects of diabetes medicines on blood glucose levels;  -List diabetes medication taken, action & side effects 2 [x] All     []  []  3 12/7/22   Jardiance in AM   Insulin/Injectables/glucagon  -Name appropriate injection sites; proper storage; supplies needed;     []   12/7/22 PC  Victoza daily    Demonstrate proper technique  []      Monitoring blood glucose, interpreting and using results:   -Identify the purpose of testing   -Identify recommended & personal blood glucose targets & HgbA1C target levels  -State the Importance of logging blood glucose levels for pattern recognition;   -State benefits of reading/using pt generated health data  -Verbalize safe lancet disposal 2 [x] All     []  []    []  []  []  3 . 12/7/22   Testing daily  A1C 7.1%   -Demonstrate proper testing technique  []      Incorporating physical activity into lifestyle:   -State effect of exercise on blood glucose levels;   -State benefits of regular exercise;   -Define safety considerations/food choices if needed.  -Describe contraindications/maintenance of activity.  2 [x] All     []  []    []  []  3 12/7/22   No routine exercise   Incorporating nutritional management into lifestyle:   -Describe effect of type, amount & timing of food on blood glucose  -Describe methods for preparing and planning   healthy meals  -Correctly read food labels for nutritional values  -Name 3 foods high in Carbohydrate  -Plan a sample 4 carbohydrate-controlled meal using Diabetes Plate Method  -Verbalized ability to measure and count carbohydrate gram servings using food labels and carbohydrate food list.    -Plan a carbohydrate-controlled meal based on individual needs/preferences from a Registered Dietitian.   1 [] All       []    []    []  []      []        [] Developing strategies for problem solving to promote health/change behavior. -Identify 7 self-care behaviors; Personal health risk factors; Benefits, challenges & strategies for behavioral change and set an individualized goal selection. 1       []      []Nutrition  []Monitoring  []Exercise  []Medication  []Other     Identified Barriers to learning/adherence to self management plan:    None  []  other    Instruction Method:  Lecture/Discussion, Power Point Presentation , Handouts, and Return demonstration     Supporting Education Materials/Equipment Provided: Self-management manual and Nutritional Packet   []Macedonian materials       [] services     []Other:      Encounter Type Date Attended Start Time End Time Comments No Show Dates   Assessment 12/7/22 PC         Session 1 12/7/22 PC 0900 1130  In person    Session 2        1:1 DSMES          In person Follow-up         Gestational Diabetes         DSMES #3        Meter Instrx        Insulin Instrx           Additional Comments: [] Pt seen individually due to Covid-19 Safety precautions and no group session available.         Date:   Follow-up goal attainment based on patients initial DSMES goal    Dr Notified by [] EMR []Fax        []Post class Hgb A1C  []Medication compliance   []Plate method/meal plan compliance   []Able to state the number of Carbohydrate servings eaten at B,L,D   []Testing blood glucose as prescribed by PCP   []Exercise Routine   []Other:   []Other:     []Patient lost to follow-up  Dr Notified by []EMR []Fax

## 2022-12-08 NOTE — LETTER
[facility])- Diabetes Education Department Diabetes Education Letter    2022       Re:     Korin Barone         :  1955  Dear Yong Arreguin:                    Thank you for referring your patient, Korin Barone, to [facility]) diabetes education. Korin Barone has completed their personalized comprehensive education plan. The education plan included the following topics:          Diabetes disease process & treatment   Healthy Eating  Being Active  Taking Medication  Monitoring Glucose  Acute and Chronic Complications  Lifestyle and Healthy coping  Diabetes Distress and Support       In addition, the PAID -5 (Problem Areas in Diabetes) Survey was completed. The results From 2022 were n/a  A total score of 8 or greater indicates possible diabetes related emotional distress which warrants further assessment.  [] [address]  and Crisis Resource information provided. Korin Barone selected behavioral goal is:    [x]Healthy Eating  []Monitoring  []Problem Solving  []Being Active  []Taking Medication  []Other: We will send a follow-up letter in 3 months to notify you of their progress. Thank you for referring this patient to our program.  Please do not hesitate to call if you have any questions at [phone] [facility] or [address] [place] [address]) or [phone] ([address] [place] [address]).         Sincerely,    [facility] Diabetes Education Department  American Diabetes Association Recognized DSMES Program rev. 10/19/22

## 2022-12-08 NOTE — PROGRESS NOTES
12/7/22 PC  Stated both lows and highs and does recognize them           -Identify severe weather/situation crisis  & diabetes supplies management  []      Using medications safely:   -State effects of diabetes medicines on blood glucose levels;  -List diabetes medication taken, action & side effects 2 [x] All     []  []  3 12/7/22   Jardiance in AM   Insulin/Injectables/glucagon  -Name appropriate injection sites; proper storage; supplies needed;     []   12/7/22 PC  Victoza daily    Demonstrate proper technique  []      Monitoring blood glucose, interpreting and using results:   -Identify the purpose of testing   -Identify recommended & personal blood glucose targets & HgbA1C target levels  -State the Importance of logging blood glucose levels for pattern recognition;   -State benefits of reading/using pt generated health data  -Verbalize safe lancet disposal 2 [x] All     []  []    []  []  []  3 . 12/7/22   Testing daily  A1C 7.1%   -Demonstrate proper testing technique  []      Incorporating physical activity into lifestyle:   -State effect of exercise on blood glucose levels;   -State benefits of regular exercise;   -Define safety considerations/food choices if needed.  -Describe contraindications/maintenance of activity.  2 [x] All     []  []    []  []  3 12/7/22   No routine exercise   Incorporating nutritional management into lifestyle:   -Describe effect of type, amount & timing of food on blood glucose  -Describe methods for preparing and planning   healthy meals  -Correctly read food labels for nutritional values  -Name 3 foods high in Carbohydrate  -Plan a sample 4 carbohydrate-controlled meal using Diabetes Plate Method  -Verbalized ability to measure and count carbohydrate gram servings using food labels and carbohydrate food list.    -Plan a carbohydrate-controlled meal based on individual needs/preferences from a Registered Dietitian.   1 [] All       [x]    [x]    [x]  [x]      [x]        [] 3 12/8/22 SE  Reviewed nutritional recommendations for salt, added sugar, fats, and fiber. Reviewed plate method and portion control. Reviewed which foods contain carbohydrates, how carbs impact blood glucose, how to count carbs, and how to spread carbs evenly throughout the day. Label reading reviewed. Patient very attentive to education and demonstrated understanding. Asked questions and took notes. Developing strategies for problem solving to promote health/change behavior. -Identify 7 self-care behaviors; Personal health risk factors; Benefits, challenges & strategies for behavioral change and set an individualized goal selection. 1       [x]  3  12/8/22 SE  I will use the plate method at each meal to reduce portions  [x]Nutrition  []Monitoring  []Exercise  []Medication  []Other     Identified Barriers to learning/adherence to self management plan:    None  []  other    Instruction Method:  Lecture/Discussion, Power Point Presentation , Handouts, and Return demonstration     Supporting Education Materials/Equipment Provided: Self-management manual and Nutritional Packet   []Armenian materials       [] services     []Other:      Encounter Type Date Attended Start Time End Time Comments No Show Dates   Assessment 12/7/22 PC         Session 1 12/7/22 PC 0900 1130  In person    Session 2 12/8/22 SE 0900 1130 In person    1:1 DSMES          In person Follow-up         Gestational Diabetes         DSMES #3        Meter Instrx        Insulin Instrx           Additional Comments: [] Pt seen individually due to Covid-19 Safety precautions and no group session available.   Came with daughterJozef        Date:   Follow-up goal attainment based on patients initial DSMES goal    Dr Notified by [] EMR []Fax        []Post class Hgb A1C  []Medication compliance   []Plate method/meal plan compliance   []Able to state the number of Carbohydrate servings eaten at B,L,D   []Testing blood glucose as prescribed by PCP   []Exercise Routine   []Other:   []Other:     []Patient lost to follow-up  Dr Notified by []EMR []Fax

## 2022-12-16 RX ORDER — IBUPROFEN 800 MG/1
TABLET ORAL
Qty: 120 TABLET | Refills: 1 | OUTPATIENT
Start: 2022-12-16

## 2022-12-30 DIAGNOSIS — M79.642 BILATERAL HAND PAIN: Primary | ICD-10-CM

## 2022-12-30 DIAGNOSIS — M79.641 BILATERAL HAND PAIN: Primary | ICD-10-CM

## 2023-01-05 ENCOUNTER — HOSPITAL ENCOUNTER (OUTPATIENT)
Dept: DIABETES SERVICES | Age: 68
Setting detail: THERAPIES SERIES
Discharge: HOME | End: 2023-01-05
Payer: MEDICARE

## 2023-01-05 PROCEDURE — G0108 DIAB MANAGE TRN  PER INDIV: HCPCS

## 2023-01-05 NOTE — PROGRESS NOTES
Diabetes Self-Management Education Record    Participant Name: Anne Muniz  Referring Provider: COLE Sellers CNP  Assessment/Evaluation Ratings:  1=Needs Instruction   4=Demonstrates Understanding/Competency  2=Needs Review   NC=Not Covered    3=Comprehends Key Points  N/A=Not Applicable  Topics/Learning Objectives Pre-session Assess Date:  Instructor initials/date  12/7/22 PC Instr. Date  12/7/22   Instructor initials/date Follow-up Post- session Eval Comments   Diabetes disease process & Treatment process:   -Define type of diabetes in simple terms.  - Describe the ABCs of  diabetes management  -Identify own type of diabetes  -Identify lifestyle changes/treatment options  -other:  2 [x] All     []  []  []  []  []  3 12/7/22 PC  Hx of type 2 DM for 5 yrs  No prior education  Family hx   Developing strategies for Healthy coping/psychosocial issues:    -Describe feelings about living with diabetes  -Identify coping strategies and sources of stress  -Identify support needed & support network available  -Complete PAID-5 Diabetes questionnaire 2 [x] All     []  []  []    []  3 Date:12/7/22   PAID-5 Score:did not fill out  Confidence Score: 5           Prevention, detection & treatment of Chronic complications:    -Identify the prevention, detection and treatment for complications including immunizations, preventive eye, foot, dental and renal exams as indicated per the participant's duration of diabetes and health status.  -Define the natural course of diabetes and the relationship of blood glucose levels to long term complications of diabetes.   2 [x] All     []            []  3 12/7/22 PC  Hyperlipidemia  Hypertension   Prevention, detection & treatment of acute complications:    -State the causes,signs & symptoms of hyper & hypoglycemia, and prevention & treatment strategies.   -Describe sick day guidelines  DKA /indications for ketone testing &  when to call physician  2 [x] All     []      []    3 12/7/22 PC  Stated both lows and highs and does recognize them           -Identify severe weather/situation crisis  & diabetes supplies management  []      Using medications safely:   -State effects of diabetes medicines on blood glucose levels;  -List diabetes medication taken, action & side effects 2 [x] All     []  []  3 12/7/22   Jardiance in AM   Insulin/Injectables/glucagon  -Name appropriate injection sites; proper storage; supplies needed;     []   12/7/22 PC  Victoza daily    Demonstrate proper technique  []      Monitoring blood glucose, interpreting and using results:   -Identify the purpose of testing   -Identify recommended & personal blood glucose targets & HgbA1C target levels  -State the Importance of logging blood glucose levels for pattern recognition;   -State benefits of reading/using pt generated health data  -Verbalize safe lancet disposal 2 [x] All     []  []    []  []  []  3 . 12/7/22   Testing daily  A1C 7.1%   -Demonstrate proper testing technique  []      Incorporating physical activity into lifestyle:   -State effect of exercise on blood glucose levels;   -State benefits of regular exercise;   -Define safety considerations/food choices if needed.  -Describe contraindications/maintenance of activity.  2 [x] All     []  []    []  []  3 12/7/22   No routine exercise   Incorporating nutritional management into lifestyle:   -Describe effect of type, amount & timing of food on blood glucose  -Describe methods for preparing and planning   healthy meals  -Correctly read food labels for nutritional values  -Name 3 foods high in Carbohydrate  -Plan a sample 4 carbohydrate-controlled meal using Diabetes Plate Method  -Verbalized ability to measure and count carbohydrate gram servings using food labels and carbohydrate food list.    -Plan a carbohydrate-controlled meal based on individual needs/preferences from a Registered Dietitian.   1 [] All       [x]    [x]    [x]  [x]      [x]        [x] 3 12/8/22 SE  Reviewed nutritional recommendations for salt, added sugar, fats, and fiber. Reviewed plate method and portion control. Reviewed which foods contain carbohydrates, how carbs impact blood glucose, how to count carbs, and how to spread carbs evenly throughout the day. Label reading reviewed. Patient very attentive to education and demonstrated understanding. Asked questions and took notes. 1/5/23 SE  Pt instructed on  Calories, 3 meals  1 Snack(s). 3-4-4-1 Pt was able to plan meal using food models and serving size sheet. Patient encouraged to have planned snacks with 1 CHO choice added, 2 hrs after meal, if she has symptoms of hypoglycemia regularly and to call with any questions or concerns. Developing strategies for problem solving to promote health/change behavior. -Identify 7 self-care behaviors; Personal health risk factors; Benefits, challenges & strategies for behavioral change and set an individualized goal selection.  1       [x]  3  12/8/22 SE  I will use the plate method at each meal to reduce portions  [x]Nutrition  []Monitoring  []Exercise  []Medication  []Other     Identified Barriers to learning/adherence to self management plan:    None  []  other    Instruction Method:  Lecture/Discussion, Power Point Presentation , Handouts, and Return demonstration     Supporting Education Materials/Equipment Provided: Self-management manual and Nutritional Packet   []Luxembourgish materials       [] services     []Other:      Encounter Type Date Attended Start Time End Time Comments No Show Dates   Assessment 12/7/22 PC         Session 1 12/7/22 PC 0900 1130  In person    Session 2  Session 3 12/8/22 SE  1/5/23 SE 0900  1430 1130  1500 In person  In person    1:1 DSMES          In person Follow-up         Gestational Diabetes         DSMES #3        Meter Instrx        Insulin Instrx           Additional Comments: [] Pt seen individually due to Covid-19 Safety precautions and no group session available.   Came with daughter, Piotr Del Rio        Date:   Follow-up goal attainment based on patients initial DSMES goal    Dr Notified by [] EMR []Fax        []Post class Hgb A1C  []Medication compliance   []Plate method/meal plan compliance   []Able to state the number of Carbohydrate servings eaten at B,L,D   []Testing blood glucose as prescribed by PCP   []Exercise Routine   []Other:   []Other:     []Patient lost to follow-up  Dr Judson Herndon by []EMR []Fax

## 2023-01-26 ENCOUNTER — TELEPHONE (OUTPATIENT)
Dept: ADMINISTRATIVE | Age: 68
End: 2023-01-26

## 2023-01-27 ENCOUNTER — TELEPHONE (OUTPATIENT)
Dept: FAMILY MEDICINE CLINIC | Age: 68
End: 2023-01-27

## 2023-01-27 NOTE — TELEPHONE ENCOUNTER
Bharat Clemens from [address] [place] [address]  is calling with concerns of two different scripts for same medication with two different strengths. It's going to [facility]. Name of medication is Rosuvastatin 40 mg and then 20 mg. Bharat Clemens would like to talk to you about what you want to do. Please advise.      Last seen 11/3/2022  Next appt 2/7/2023

## 2023-01-30 NOTE — TELEPHONE ENCOUNTER
Per Shirley Sheikh: The directions on the RX was increase to 40 mg and to discontinue the 20 mg.      Taya Calixto

## 2023-01-31 ENCOUNTER — OFFICE VISIT (OUTPATIENT)
Dept: PHYSICAL MEDICINE AND REHAB | Age: 68
End: 2023-01-31

## 2023-01-31 VITALS
HEART RATE: 83 BPM | BODY MASS INDEX: 35.08 KG/M2 | HEIGHT: 63 IN | WEIGHT: 198 LBS | DIASTOLIC BLOOD PRESSURE: 77 MMHG | SYSTOLIC BLOOD PRESSURE: 116 MMHG

## 2023-01-31 DIAGNOSIS — M18.11 PRIMARY OSTEOARTHRITIS OF FIRST CARPOMETACARPAL JOINT OF RIGHT HAND: ICD-10-CM

## 2023-01-31 DIAGNOSIS — M18.12 PRIMARY OSTEOARTHRITIS OF FIRST CARPOMETACARPAL JOINT OF LEFT HAND: ICD-10-CM

## 2023-01-31 DIAGNOSIS — E78.2 MIXED HYPERLIPIDEMIA: ICD-10-CM

## 2023-01-31 DIAGNOSIS — E89.0 POSTOPERATIVE HYPOTHYROIDISM: ICD-10-CM

## 2023-01-31 DIAGNOSIS — M79.609 PAIN IN EXTREMITY, UNSPECIFIED EXTREMITY: Primary | ICD-10-CM

## 2023-01-31 DIAGNOSIS — E11.9 TYPE 2 DIABETES MELLITUS WITHOUT COMPLICATION, WITHOUT LONG-TERM CURRENT USE OF INSULIN (HCC): ICD-10-CM

## 2023-01-31 LAB
BASOPHILS ABSOLUTE: 0.09 E9/L (ref 0–0.2)
BASOPHILS RELATIVE PERCENT: 0.9 % (ref 0–2)
EOSINOPHILS ABSOLUTE: 0.06 E9/L (ref 0.05–0.5)
EOSINOPHILS RELATIVE PERCENT: 0.6 % (ref 0–6)
HCT VFR BLD CALC: 51.5 % (ref 34–48)
HEMOGLOBIN: 17 G/DL (ref 11.5–15.5)
IMMATURE GRANULOCYTES #: 0.04 E9/L
IMMATURE GRANULOCYTES %: 0.4 % (ref 0–5)
LYMPHOCYTES ABSOLUTE: 2.13 E9/L (ref 1.5–4)
LYMPHOCYTES RELATIVE PERCENT: 21.2 % (ref 20–42)
MCH RBC QN AUTO: 28.7 PG (ref 26–35)
MCHC RBC AUTO-ENTMCNC: 33 % (ref 32–34.5)
MCV RBC AUTO: 87 FL (ref 80–99.9)
MONOCYTES ABSOLUTE: 0.73 E9/L (ref 0.1–0.95)
MONOCYTES RELATIVE PERCENT: 7.3 % (ref 2–12)
NEUTROPHILS ABSOLUTE: 6.99 E9/L (ref 1.8–7.3)
NEUTROPHILS RELATIVE PERCENT: 69.6 % (ref 43–80)
PDW BLD-RTO: 15.2 FL (ref 11.5–15)
PLATELET # BLD: 338 E9/L (ref 130–450)
PMV BLD AUTO: 10 FL (ref 7–12)
RBC # BLD: 5.92 E12/L (ref 3.5–5.5)
WBC # BLD: 10 E9/L (ref 4.5–11.5)

## 2023-01-31 RX ORDER — TRIAMCINOLONE ACETONIDE 40 MG/ML
20 INJECTION, SUSPENSION INTRA-ARTICULAR; INTRAMUSCULAR ONCE
Status: COMPLETED | OUTPATIENT
Start: 2023-01-31 | End: 2023-01-31

## 2023-01-31 RX ORDER — LIDOCAINE HYDROCHLORIDE 10 MG/ML
0.5 INJECTION, SOLUTION INFILTRATION; PERINEURAL ONCE
Status: COMPLETED | OUTPATIENT
Start: 2023-01-31 | End: 2023-01-31

## 2023-01-31 RX ADMIN — TRIAMCINOLONE ACETONIDE 20 MG: 40 INJECTION, SUSPENSION INTRA-ARTICULAR; INTRAMUSCULAR at 15:26

## 2023-01-31 RX ADMIN — LIDOCAINE HYDROCHLORIDE 0.5 ML: 10 INJECTION, SOLUTION INFILTRATION; PERINEURAL at 15:25

## 2023-01-31 NOTE — PROGRESS NOTES
Allan Rojo D.O. [facility]  [address]  Phone: [phone]  Fax: [phone]      PCP: COLE Guillen CNP  Date of visit: 2/1/23    Chief Complaint   Patient presents with    Hand Pain     Bilateral hand pain       Dear Caio Simpson,    As you know,  Nathaniel Horton is a 69 y.o.  right hand dominant woman with sudden onset of bilateral base of thumb pain after no known injury several months ago. Now, the pain is constant and occurs daily. The pain is rated Pain Score:  10 - Worst pain ever, is described as sharp, and is located in the bilateral base of thumb without radiation. The symptoms have been worse since onset. The pain is better with splint. The pain is worse with activity. I have reviewed the following medical records: Dr. Emma Lopez office ntoe. Lab Results   Component Value Date     01/31/2023    K 4.1 01/31/2023    CL 98 01/31/2023    CO2 29 01/31/2023    BUN 13 01/31/2023    CREATININE 0.9 01/31/2023    GLUCOSE 100 (H) 01/31/2023    CALCIUM 9.8 01/31/2023    PROT 7.6 01/31/2023    LABALBU 4.4 01/31/2023    BILITOT 0.6 01/31/2023    ALKPHOS 65 01/31/2023    AST 20 01/31/2023    ALT 12 01/31/2023    LABGLOM >60 01/31/2023    GFRAA >60 06/20/2022      I have personally interpreted the following tests: none. The prior treatment has included:none. An independent historian was not needed.      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 6/22/2022    Diabetes ([address]     Elevated red blood cell count 9/13/2019    GERD (gastroesophageal reflux disease)     Hx of colonoscopy with polypectomy 6/27/2022    Colonoscopy 9/21/21 DR Perla, moderate diverticulosis sigmoid colon, 10 mm polyp rectum hyperplastic, 16 mm polyp hepatic flexure sessile serrated adenoma, 10 mm ascending polyp tubular adenoma, sessile serrated adenoma Repeat in 3 years     Hyperlipidemia     Hypertension     Hypertension     Hypothyroidism     Neuropathy     Obesity     Pulmonary emphysema ([facility] 11/3/2022    Severe episode of recurrent major depressive disorder, without psychotic features ([facility] 2018    Tinea pedis of both feet 2019    Type 2 diabetes mellitus without complication ([facility]        Past Surgical History:   Procedure Laterality Date    CHOLECYSTECTOMY      FOOT SURGERY      HEEL SPURS BOTH FEET    GALLBLADDER SURGERY  2006    HYSTERECTOMY (CERVIX STATUS UNKNOWN)  1980    TOTAL THYROIDECTOMY  2017    goiter with compressive symptoms       Social History     Tobacco Use    Smoking status: Every Day     Packs/day: 0.50     Years: 47.00     Pack years: 23.50     Types: Cigarettes     Start date:     Smokeless tobacco: Never    Tobacco comments:     20 years 1 pack last 2 days only smoking 10 cigarettes a day     2017? ?    Vaping Use    Vaping Use: Never used   Substance Use Topics    Alcohol use: No     Alcohol/week: 0.0 standard drinks    Drug use: No         Family History   Problem Relation Age of Onset    Heart Disease Mother     Diabetes Mother          diabetic neuropathy    High Cholesterol Mother     Diabetes Father          from diabetic neuropathy stated patient    High Cholesterol Father        Allergies   Allergen Reactions    Bactrim [Sulfamethoxazole-Trimethoprim] Shortness Of Breath     N/v/aching    Atenolol      Anxiousness muscles hurt    Lipitor [Atorvastatin] Other (See Comments)     cough    Metformin And Related Diarrhea     Severe 11 - 12 times a day       Current Outpatient Medications   Medication Sig Dispense Refill    rosuvastatin (CRESTOR) 40 MG tablet Take 1 tablet by mouth at bedtime For cholesterol, note increase in dose discontinue crestor 20 mg 90 tablet 1    umeclidinium-vilanterol (ANORO ELLIPTA) 62.5-25 MCG/INH AEPB inhaler Inhale 1 puff into the lungs daily Rinse mouth after each use 1 each 4    empagliflozin (JARDIANCE) 25 MG tablet Take 1 tablet by mouth daily 90 tablet 1    alendronate (FOSAMAX) 70 MG tablet Take 1 tablet by mouth every 7 days Take on empty stomach with full glass of water and sit up for 30 minutes, no other medications at the same time 12 tablet 3    pantoprazole (PROTONIX) 40 MG tablet Take 1 tablet by mouth daily 90 tablet 3    venlafaxine (EFFEXOR XR) 150 MG extended release capsule Take 1 capsule by mouth daily 90 capsule 3    hydroCHLOROthiazide (HYDRODIURIL) 25 MG tablet TAKE 1 TABLET ONCE DAILY FOR BLOOD PRESSURE 90 tablet 3    levothyroxine (SYNTHROID) 75 MCG tablet Take 1 tablet by mouth daily 1 hour before breakfast For thyroid 90 tablet 3    Liraglutide (VICTOZA) 18 MG/3ML SOPN SC injection Inject 1.8 mg into the skin daily 9 mL 11    ibuprofen (ADVIL;MOTRIN) 800 MG tablet Take 1 tablet by mouth every 6 hours as needed for Pain 120 tablet 1    ammonium lactate (LAC-HYDRIN) 12 % lotion APPLY TO RIGHT AND LEFT FOOT TO DRY SKIN AND NAILS DAILY      Misc Natural Products (ELDERBERRY ZINC/VIT C/IMMUNE MT) Take by mouth      Multiple Vitamin (MULTI-VITAMIN DAILY PO) Take by mouth      aspirin 81 MG tablet Take 81 mg by mouth daily      blood glucose test strips (ONETOUCH VERIO) strip As needed. 200 strip 3    Lancets (ONETOUCH DELICA PLUS LKOCIW31N) MISC Inject 100 Devices into the skin daily 200 each 3    ondansetron (ZOFRAN) 4 MG tablet Take 1 tablet by mouth 3 times daily as needed for Nausea or Vomiting 30 tablet 0    BD PEN NEEDLE SHANEL U/F 32G X 4 MM MISC Inject 1 each into the skin daily 100 each 4    glucose monitoring kit (FREESTYLE) monitoring kit 1 kit by Does not apply route daily 1 kit 0     No current facility-administered medications for this visit. Review of Systems - For review of systems, positive symptoms are underlined and negative findings are not underlined. General: chills, fatigue, fever, malaise, night sweats, weight gain,  weight loss. Psychological: anxiety, depression, suicidal ideation, sleep disturbances, behavioral disorder, difficulty concentrating, disorientation, hallucinations, mood swings, obsessive thoughts, physical abuse,  sexual abuse. Ophthalmic: blurry vision, decreased vision, double vision, loss of vision, photophobia, use of corrective device. Ear Nose Throat: hearing loss, tinnitus, phonophobia, sensitivity to smells, vertigo, or vocal changes. Allergy/Immunology: seasonal allergies, watery eyes, itchy eyes, frequent infections. Hematological and Lymphatic: bleeding problems, blood clots, bruising,  yellowing of the skin, swollen lymph nodes. Endocrine:  polydypsia, polyuria, temperature intolerance. Respiratory: cough, shortness of breath, wheezing. Cardiovascular: syncope, chest pain, dyspnea on exertion, edema, irregular heartbeat,  palpitations. Gastrointestinal: abdominal pain, constipation, diarrhea,  decreased appetite, heartburn, hematemesis, melena, nausea, vomiting, stool incontinence, abnormal swallowing. Genito-Urinary: dysuria, hematuria, incontinence, frequency, urgency. Musculoskeletal: joint pain, stiffness, swelling, muscle pain, muscle  tenderness. Neurological: confusion, memory loss, dizziness, gait disturbance, headaches, impaired coordination, decreased balance, numbness/tingling, seizures, speech problems, tremors,weakness. Dermatological:  hair changes, nail changes, pruritus, rash. Physical Exam: Blood pressure 116/77, pulse 83, height 5' 3\" (1.6 m), weight 198 lb (89.8 kg), last menstrual period 03/01/1978, not currently breastfeeding. General: The patient is in no apparent distress. Body habitus is obese. HEENT: No rhinorrhea, sneezing, yawning, or lacrimation. No scleral icterus or conjunctival injection. SKIN: No piloerection. No track marks. No rash. Normal turgor. No erythema or ecchymosis. Psychological: Mood and affect are appropriate. Hygiene is appropriate.  Cardiovascular:  Heart is regular rate and rhythm. Peripheral pulses are 2+ at the dorsalis pedis, posterior tibial and radial arteries. There is no edema. Respiratory: Respirations are regular and unlabored. There is no cyanosis. Lymphatic: There is no cervical or inguinal lymphadenopathy. Gastrointestinal: Soft abdomen, non-tender. No pulsating abdominal mass. Genitourinary: No costovertebral angle tenderness. MSK: Bilateral Hand/Wrist:No edema, warmth, erythema, ecchymosis, effusion, instability, atrophy or deformity of the hand and wrist.  No nodules. No atrophy. No tenderness or fullness in the anatomic snuffbox bilaterally. Tenderness bilateral first CMC joint with edema and erythema on the right side. CMC grind was positive on right negative on left. No tenderness to palpation at the  carpal-metacarpal, metacarpal-phalangeal or interphalageal joint. No trigger finger or thumb. No pain with valgus or varus stress at the thumb metacarpal-phalangeal joints. Negative Finkelstein's. Negative Froment's Palmaris brevis sign and Wartenberg's. Negative Ok sign. Negative Tinel at wrist and elbow. Negative Phalen, and Carpal compression tests. Thumb ulnar collateral stress test shows no instability. Digit collateral stress testing showed no instability. Tee's sign was negative. Supination lift test is negative. Whitley test was negative. Samuel test was negative. Sweater finger sign was absent. Full and painless AROM of the hand and wrist without crepitus. Negative Spurling. Neurologic: Awake, alert and oriented in three planes. Speech is fluent. No facial weakness. Tongue is midline. Hearing is intact for conversation. Pupils are equal and round. Extraocular muscles are intact. Hearing is intact for conversation. Shoulder shrug symmetric. Sensation: Intact for light touch and pin prick in all upper and lower extremity dermatomes. Vibration and proprioception are intact at the bilateral first MTP.   Strength: 5/5 in all myotomes in the upper and lower extremities. Muscle Tendon Reflexes: 2+ symmetric in the bilateral upper and lower extremities. Babinski is downgoing bilaterally. Lotus Silvan is negative bilaterally. Gait is Normal.   Romberg is negative. Heel and toe walk are normal.  Tandem walk is normal.  No clonus or spasticity. The patient was able to rise from a chair and squat without difficulty. There is no tremor. Impression:   1. Pain in extremity, unspecified extremity    2. Primary osteoarthritis of first carpometacarpal joint of left hand    3. Primary osteoarthritis of first carpometacarpal joint of right hand        Plan:   I have ordered the following unique test(s):  Orders Placed This Encounter   Procedures    US GUIDED NEEDLE PLACEMENT     Order Specific Question:   Will this be performed in the office today? If yes, the order will immediately fall to your reading worklist where you can document your result. Answer: Yes    XR HAND RIGHT (MIN 3 VIEWS)     Standing Status:   Future     Number of Occurrences:   1     Standing Expiration Date:   1/31/2024    XR HAND LEFT (MIN 3 VIEWS)     Standing Status:   Future     Number of Occurrences:   1     Standing Expiration Date:   1/31/2024     This has included the decision for minor procedure: right first cmc injection with us guidance. Procedure risk factors were discussed. Prescription drug management has included:     Orders Placed This Encounter   Medications    triamcinolone acetonide (KENALOG-40) injection 20 mg    lidocaine 1 % injection 0.5 mL      There are no discontinued medications. Topical Voltaren gel 2 g qid as needed. Thumb spica   Medications prescribed do not require monitoring for toxicity    Diagnosis and treatment were significantly impacted by social determinants of health including BMI was elevated today, and weight loss plan recommended is : conventional weight loss.     The patient was educated about the diagnosis, prognosis, indications, risks and benefits of treatment. An opportunity to ask questions was given to the patient and questions were answered.  The patient agreed to proceed with the recommended treatment as described above.   Return in about 1 week (around 2/7/2023). For left first CMC injection if needed       Thank you for the consultation and for allowing me to participate in the care of this patient.      Sincerely,         Sangeeta Silva D.O., P.T.  Board Certified Physical Medicine and Rehabilitation  Board Certified Electrodiagnostic Medicine          Sangeeta Silva D.O.  [facility]  [address] [place] [address]  Phone: [phone]  Fax: [phone]    2/1/2023    Chief Complaint   Patient presents with    Hand Pain     Bilateral hand pain       Last injection: n/a  Taking anticoagulants/antiplatelets: No  Diabetic: No  Febrile/active infection: No    Ambulatory Procedure Time Out  Correct Patient: Yes  Correct Procedure: Yes  Correct Site/Side: Yes  Correct Site(s) Marked: Yes  Informed Consent Signed: Yes  Allergies Verified: Yes  Staff Present & Credential:: ANNETTE Partida LPN, Dr. Silva DO    After explaining the indications, risks, benefits and alternatives of a right first carpal metacarpal joint injection, the patient agreed to proceed.  Permit was signed and scanned into the media. The patient was placed in the sitting position. The skin was prepared with betadine and alcohol. Ethyl Chloride vapocoolant spray was used for local anesthesia.  Using a no touch aseptic technique, a 25 gauge, 5/8\" needle with 0.5 cc of Kenalog 40mg/cc and 0.5 cc of Xylocaine 1% was directed to the joint using ultrasound guidance.  After negative aspiration the medication was injected.  Adequate hemostasis was obtained and a bandage applied to the injection site. The patient was monitored clinically after the injection and left the office without incident. The patient tolerated the  procedure well and was educated in post injection care. There was post-injection reduction in pain. Images were scanned into the EMR separately. Giles Buckley D.O., P.T.   Board Certified Physical Medicine and Rehabilitation  Board Certified Electrodiagnostic Medicine    Administrations This Visit       lidocaine 1 % injection 0.5 mL       Admin Date  01/31/2023  15:25 Action  Given Dose  0.5 mL Route  Other Site   Administered By  Christiano Diana LPN    Ordering Provider: Mo Ann DO    NDC: 1705-6819-72    Lot#: [id number]    [id number]: [address]    Patient Supplied?: No              triamcinolone acetonide (KENALOG-40) injection 20 mg       Admin Date  01/31/2023  15:26 Action  Given Dose  20 mg Route  Intra-artICUlar Site   Administered By  Christiano Diana LPN    Ordering Provider: Mo Ann DO    NDC: 1029-4588-50    Lot#: [id number]    : [facility])    Patient Supplied?: No

## 2023-02-01 ENCOUNTER — TELEPHONE (OUTPATIENT)
Dept: PHYSICAL MEDICINE AND REHAB | Age: 68
End: 2023-02-01

## 2023-02-01 LAB
ALBUMIN SERPL-MCNC: 4.4 G/DL (ref 3.5–5.2)
ALP BLD-CCNC: 65 U/L (ref 35–104)
ALT SERPL-CCNC: 12 U/L (ref 0–32)
ANION GAP SERPL CALCULATED.3IONS-SCNC: 14 MMOL/L (ref 7–16)
AST SERPL-CCNC: 20 U/L (ref 0–31)
BILIRUB SERPL-MCNC: 0.6 MG/DL (ref 0–1.2)
BUN BLDV-MCNC: 13 MG/DL (ref 6–23)
CALCIUM SERPL-MCNC: 9.8 MG/DL (ref 8.6–10.2)
CHLORIDE BLD-SCNC: 98 MMOL/L (ref 98–107)
CHOLESTEROL, TOTAL: 130 MG/DL (ref 0–199)
CO2: 29 MMOL/L (ref 22–29)
CREAT SERPL-MCNC: 0.9 MG/DL (ref 0.5–1)
GFR SERPL CREATININE-BSD FRML MDRD: >60 ML/MIN/1.73
GLUCOSE BLD-MCNC: 100 MG/DL (ref 74–99)
HDLC SERPL-MCNC: 51 MG/DL
LDL CHOLESTEROL CALCULATED: 51 MG/DL (ref 0–99)
POTASSIUM SERPL-SCNC: 4.1 MMOL/L (ref 3.5–5)
SODIUM BLD-SCNC: 141 MMOL/L (ref 132–146)
T4 FREE: 1.56 NG/DL (ref 0.93–1.7)
TOTAL PROTEIN: 7.6 G/DL (ref 6.4–8.3)
TRIGL SERPL-MCNC: 140 MG/DL (ref 0–149)
TSH SERPL DL<=0.05 MIU/L-ACNC: 2.73 UIU/ML (ref 0.27–4.2)
VLDLC SERPL CALC-MCNC: 28 MG/DL

## 2023-02-01 NOTE — TELEPHONE ENCOUNTER
----- Message from Jason Siegel DO sent at 2/1/2023  1:04 PM EST -----  Reviewed test abnormal, inform patient that it showed degenerative changes of the first [facility] and IP joints.

## 2023-02-01 NOTE — TELEPHONE ENCOUNTER
Tried to contact the patient but her mailbox was full and unable to leave a message. Will try again later.

## 2023-02-06 NOTE — TELEPHONE ENCOUNTER
Called and spoke with the patient and informed her of results of xray hands. Patient is aware and voiced understanding.

## 2023-02-07 ENCOUNTER — OFFICE VISIT (OUTPATIENT)
Dept: FAMILY MEDICINE CLINIC | Age: 68
End: 2023-02-07
Payer: MEDICARE

## 2023-02-07 VITALS
TEMPERATURE: 97.4 F | BODY MASS INDEX: 33.31 KG/M2 | DIASTOLIC BLOOD PRESSURE: 64 MMHG | RESPIRATION RATE: 16 BRPM | SYSTOLIC BLOOD PRESSURE: 128 MMHG | HEART RATE: 81 BPM | WEIGHT: 188 LBS | HEIGHT: 63 IN | OXYGEN SATURATION: 92 %

## 2023-02-07 DIAGNOSIS — I10 ESSENTIAL HYPERTENSION: ICD-10-CM

## 2023-02-07 DIAGNOSIS — E11.9 TYPE 2 DIABETES MELLITUS WITHOUT COMPLICATION, WITHOUT LONG-TERM CURRENT USE OF INSULIN (HCC): Primary | ICD-10-CM

## 2023-02-07 DIAGNOSIS — E78.2 MIXED HYPERLIPIDEMIA: ICD-10-CM

## 2023-02-07 DIAGNOSIS — G47.33 OSA (OBSTRUCTIVE SLEEP APNEA): ICD-10-CM

## 2023-02-07 DIAGNOSIS — D58.2 ELEVATED HEMOGLOBIN (HCC): ICD-10-CM

## 2023-02-07 LAB
CHP ED QC CHECK: ABNORMAL
CREATININE URINE POCT: 200
GLUCOSE BLD-MCNC: 101 MG/DL
HBA1C MFR BLD: 7 %
MICROALBUMIN/CREAT 24H UR: 80 MG/G{CREAT}
MICROALBUMIN/CREAT UR-RTO: ABNORMAL

## 2023-02-07 PROCEDURE — 3051F HG A1C>EQUAL 7.0%<8.0%: CPT | Performed by: NURSE PRACTITIONER

## 2023-02-07 PROCEDURE — 3074F SYST BP LT 130 MM HG: CPT | Performed by: NURSE PRACTITIONER

## 2023-02-07 PROCEDURE — 99214 OFFICE O/P EST MOD 30 MIN: CPT | Performed by: NURSE PRACTITIONER

## 2023-02-07 PROCEDURE — 82044 UR ALBUMIN SEMIQUANTITATIVE: CPT | Performed by: NURSE PRACTITIONER

## 2023-02-07 PROCEDURE — 3078F DIAST BP <80 MM HG: CPT | Performed by: NURSE PRACTITIONER

## 2023-02-07 PROCEDURE — 82962 GLUCOSE BLOOD TEST: CPT | Performed by: NURSE PRACTITIONER

## 2023-02-07 PROCEDURE — 83036 HEMOGLOBIN GLYCOSYLATED A1C: CPT | Performed by: NURSE PRACTITIONER

## 2023-02-07 PROCEDURE — 1123F ACP DISCUSS/DSCN MKR DOCD: CPT | Performed by: NURSE PRACTITIONER

## 2023-02-07 ASSESSMENT — PATIENT HEALTH QUESTIONNAIRE - PHQ9
SUM OF ALL RESPONSES TO PHQ QUESTIONS 1-9: 0
8. MOVING OR SPEAKING SO SLOWLY THAT OTHER PEOPLE COULD HAVE NOTICED. OR THE OPPOSITE, BEING SO FIGETY OR RESTLESS THAT YOU HAVE BEEN MOVING AROUND A LOT MORE THAN USUAL: 0
6. FEELING BAD ABOUT YOURSELF - OR THAT YOU ARE A FAILURE OR HAVE LET YOURSELF OR YOUR FAMILY DOWN: 0
SUM OF ALL RESPONSES TO PHQ9 QUESTIONS 1 & 2: 0
4. FEELING TIRED OR HAVING LITTLE ENERGY: 0
SUM OF ALL RESPONSES TO PHQ QUESTIONS 1-9: 0
10. IF YOU CHECKED OFF ANY PROBLEMS, HOW DIFFICULT HAVE THESE PROBLEMS MADE IT FOR YOU TO DO YOUR WORK, TAKE CARE OF THINGS AT HOME, OR GET ALONG WITH OTHER PEOPLE: 0
1. LITTLE INTEREST OR PLEASURE IN DOING THINGS: 0
2. FEELING DOWN, DEPRESSED OR HOPELESS: 0
5. POOR APPETITE OR OVEREATING: 0
7. TROUBLE CONCENTRATING ON THINGS, SUCH AS READING THE NEWSPAPER OR WATCHING TELEVISION: 0
SUM OF ALL RESPONSES TO PHQ QUESTIONS 1-9: 0
3. TROUBLE FALLING OR STAYING ASLEEP: 0
9. THOUGHTS THAT YOU WOULD BE BETTER OFF DEAD, OR OF HURTING YOURSELF: 0
SUM OF ALL RESPONSES TO PHQ QUESTIONS 1-9: 0

## 2023-02-07 NOTE — ASSESSMENT & PLAN NOTE
borderline controlled A1c 7.0% she did get steroid injection couple of weeks ago, at this time continue Vitoza 1.8 mg daily, jardiance 25 mg daily,   BS log reviewed with patient   Monitor BS at different times: 1 day fasting, then next day 2 hours after lunch, next day 2 hours after dinner, next day at bedtime then start over and log all values. Bring log to next appointment  Discussed hypoglycemia prevention and treatment, take medication regularly don't skip doses or double up. Foot exam every day; wash and dry well between toes, look for any redness, cracks, wounds notify provider if any problems occur. Be sure to wear hard sole shoe at all times and not go barefooted. Wear proper fitting shoes to prevent ulcer, callous formation. Reminder for Podiatry visits Q 2 Months for toenail care if needed  Reminder for annual Eye exam  Reminder to keep vaccines up dated  Exercise 30 minutes daily for 150 minutes a week  Recommend Diabetic Education Classes if you have not already attended    Foot Exam:  No signs of infection and/or necrosis noted. Sensation intact and equal bilaterally. Monofilament normal bilateral,  Interdigit spaces exhibit no abnormal skin changes and/or surface growth.  Callous left heel

## 2023-02-07 NOTE — ASSESSMENT & PLAN NOTE
Worsening referral to sleep medicine home sleep study ordered,, she has been worked up by hematology and no reason found. Patient does have symptoms of sleep apnea.

## 2023-02-07 NOTE — PROGRESS NOTES
OFFICE PROGRESS NOTE  [address]  [address] [place] [id number]  Dept: [phone]   Chief Complaint   Patient presents with    Hyperlipidemia    Hypertension    Diabetes    Sleep Apnea    Health Maintenance     Due for shingles, pneu, pended diabetic foot,        ASSESSMENT/PLAN   1. Type 2 diabetes mellitus without complication, without long-term current use of insulin ([facility])  Assessment & Plan:   borderline controlled A1c 7.0% she did get steroid injection couple of weeks ago, at this time continue Vitoza 1.8 mg daily, jardiance 25 mg daily,   BS log reviewed with patient   Monitor BS at different times: 1 day fasting, then next day 2 hours after lunch, next day 2 hours after dinner, next day at bedtime then start over and log all values. Bring log to next appointment  Discussed hypoglycemia prevention and treatment, take medication regularly don't skip doses or double up. Foot exam every day; wash and dry well between toes, look for any redness, cracks, wounds notify provider if any problems occur. Be sure to wear hard sole shoe at all times and not go barefooted. Wear proper fitting shoes to prevent ulcer, callous formation. Reminder for Podiatry visits Q 2 Months for toenail care if needed  Reminder for annual Eye exam  Reminder to keep vaccines up dated  Exercise 30 minutes daily for 150 minutes a week  Recommend Diabetic Education Classes if you have not already attended  Orders:  -     POCT glycosylated hemoglobin (Hb A1C)  -     POCT Glucose  -     POCT Microalbumin  -     Diabetic Foot Exam  2.  Mixed hyperlipidemia  Assessment & Plan:   well controlled, no significant medication side effects noted and labs reviewed in Epic CMP, CBCD, lipids continue crestor 40 mg nightly,   -Discussed low fat diet, limit fast food, goodies, breads and pastas if consuming several days a week,  limit any alcohol consumption.  -Discussed weight reduction and exercise 30 minutes 5 days a week for total of 150 minutes weekly.  -Discussed if any unusual muscle aching/pain to contact the office, discussed medication and risk of muscle pain/damage from Rhabdomyolysis.    3. Essential hypertension  Assessment & Plan:   well controlled, no significant medication side effects noted and continue HCTZ   -Discussed taking medication and directed every day.  -Discussed exercising daily 30 minutes 5 times a week for 150 minutes weekly.  -Discussed weight reduction if needed.  -Discussed low sodium diet.  -Discussed limiting caffeine consumption and tobacco cessation and the effects they have on the heart and blood pressure.   4. KARSON (obstructive sleep apnea)  Assessment & Plan:   Worsening referral to sleep medicine home sleep study ordered,, she has been worked up by hematology and no reason found. Patient does have symptoms of sleep apnea  Orders:  -     Ambulatory referral to Sleep Medicine  -     Home Sleep Study; Future  5. Elevated hemoglobin (HCC)  Assessment & Plan:   Worsening referral to sleep medicine home sleep study ordered,, she has been worked up by hematology and no reason found. Patient does have symptoms of sleep apnea.   Orders:  -     Ambulatory referral to Sleep Medicine  -     Home Sleep Study; Future       Reviewed labs: CMP, CBCD, Lipids, TSH, FT4, Lab review:   Reviewed notes from DR Silva  Reviewed radiology bilateral hand x rays    Discussed reducing caffeine intake, \"smoking cessation,  weight loss,  exercising 30 minutes daily, and taking medications as directed and adverse effects    Return in about 3 months (around 5/7/2023) for DM recheck micro albumin.     HPI:   SUBJECTIVE:     67 y.o. female presents today for follow-up of diabetes [place].  In-office bloodsugar is:   Results for POC orders placed in visit on 02/07/23   POCT Microalbumin   Result Value Ref Range    Microalb, Ur 80     Creatinine Ur POCT 200     Microalbumin Creatinine Ratio      POCT glycosylated hemoglobin (Hb A1C)   Result Value Ref Range    Hemoglobin A1C 7.0 %   POCT Glucose   Result Value Ref Range    Glucose 101 mg/dL    QC OK? Patient reports being compliant to low carbohydrate diet and increasing weekly exercises. Currently, the patient is treated with medication(s): Jardiance 25 mg daily, victoza 1.8 mg daily, . Patient reports checking home blood sugar 2 times per day. Patient states home blood sugar ranges from 88 - 166. Patient denies hypoglycemic episodes and understand to take sweetened beverages or a snack if hypoglycemic symptoms are suspected. Hypertension: Patient here for follow-up of elevated blood pressure. She is exercising and is adherent to low salt diet. Blood pressure is well controlled at home. Cardiac symptoms none. Patient denies chest pain, chest pressure/discomfort, claudication, dyspnea, exertional chest pressure/discomfort, fatigue, irregular heart beat, lower extremity edema, near-syncope, orthopnea, palpitations, paroxysmal nocturnal dyspnea, syncope, and tachypnea. Cardiovascular risk factors: advanced age (older than 54 for men, 72 for women), diabetes mellitus, dyslipidemia, hypertension, obesity (BMI >= 30 kg/m2), sedentary lifestyle, and smoking/ tobacco exposure. Use of agents associated with hypertension: NSAIDS and thyroid hormones. History of target organ damage: none. Hyperlipidemia: Patient presents with hyperlipidemia. She was tested because DM, HTN, HLD. Her last labs 1/31/23  showed Total cholesterol of 130, HDL 51, LDL 51,  Triglycerides 140. Denies chest pain, dyspnea, exertional chest pressure/discomfort, fatigue, feeding intolerance, lower extremity edema, palpitations, poor exercise tolerance, syncope, tachypnea, and skin xanthelasma. There is a family history of hyperlipidemia. There is not a family history of early ischemia heart disease. Sleep Apnea: She presents for a sleep evaluation.  She complains of snoring, choking, tossing and turning, decreased memory, excessive daytime sleepiness, falling asleep while reading, watching television, awakening in the middle of the night because of urination, feels sleepy during the day, take naps during the day. Symptoms began several years ago, gradually worsening since that time. She goes to sleep at 1 am weekdays and 10 - 11 pm weekends. She awakens 8 - 9 am weekdays and 7 - 8 am weekends. She falls asleep in few minutes. Collar size 16.5. She denies snorting, decreased concentration, decreased sexual drive, impotence, knees buckling with laughing, completely or partially paralyzed while falling asleep or waking up, sinus problems, congested nose, difficulty falling asleep once awakened, noisy environment, uncomfortable room temperature, uncomfortable bedding. Previous evaluation and treatment has included none and working on weight loss. She has had chronically elevated H/H and has been worked up by hematology and no reason found thought secondary to her smoking she is cutting back but the levels continue to rise. .        Surgical History:  has a past surgical history that includes Gallbladder surgery (2006); Hysterectomy (1980); Foot surgery; Cholecystectomy; and Total Thyroidectomy (2017). Social History:  reports that she has been smoking cigarettes. She started smoking about 48 years ago. She has a 23.50 pack-year smoking history. She has never used smokeless tobacco. She reports that she does not drink alcohol and does not use drugs. Family History: family history includes Diabetes in her father and mother; Heart Disease in her mother; High Cholesterol in her father and mother.   I have reviewed Selena's allergies, medications, problem list, medical, social and family history and have updated as needed in the electronic medical record    Unless otherwise stated in this report the patient's positive and negative responses for review of systems for constitutional, eyes, ENT, cardiovascular, respiratory, gastrointestinal, neurological, , musculoskeletal, and integument systems and related systems to the presenting problem are either stated in the history of present illness or were not pertinent or were negative for the symptoms and/or complaints related to the presenting medical problem. Positives and pertinent negatives as per HPI. All others reviewed and are negative. OBJECTIVE:     VS:  Wt Readings from Last 3 Encounters:   02/07/23 188 lb (85.3 kg)   01/31/23 198 lb (89.8 kg)   11/03/22 198 lb (89.8 kg)                       Vitals:    02/07/23 1334   BP: 128/64   Pulse: 81   Resp: 16   Temp: 97.4 °F (36.3 °C)   SpO2: 92%   Weight: 188 lb (85.3 kg)   Height: 5' 3\" (1.6 m)       General: Alert and oriented to person, place, and time, well developed and well nourished, in no acute distress  SKIN: Warm and dry, intact without any rash, masses or lesions  HEAD: normocephalic, atraumatic  Eyes: sclera/conjunctiva clear, PERRLA, EOMI's intact  ENT: tympanic membranes, external ear and ear canal normal bilaterally, normal hearing, Nose without deformity, nasal mucosa and turbinates normal without polyps   Throat: clear, tongue midline, tonsils 2+, drainage, no masses or lesions noted, good dentition  Neck: supple and non-tender without mass, trachea midline, no cervical lymphadenopathy, no bruit, no thyromegaly or nodules  Cardiovascular: regular rate and regular rhythm, normal S1 and S2, 1/6 systolic throughout the precordium, radiates to axilla, radiates to carotids no murmurs, rubs, clicks, or gallop. Distal pulses intact, no carotid bruits.  No edema  Pulmonary/Chest: clear to auscultation bilaterally, no wheezes, rales or rhonchi, normal air movement, no respiratory distress  Abdomen: soft, non-tender, non-distended, normal bowel sounds, no masses or hepatosplenomegaly  Musculoskeletal: decreased ROM in bilateral wrists due to pain, no joint swelling, deformity   Neurologic:  gait, coordination and speech normal  Extremities: no clubbing, cyanosis, or edema. Psychiatric: Good eye contact, normal mood and affect, answers questions appropriately  Foot Exam:  No signs of infection and/or necrosis noted. Sensation intact and equal bilaterally. Monofilament normal bilateral,  Interdigit spaces exhibit no abnormal skin changes and/or surface growth. Callous left heel    I have reviewed my findings and recommendations with Victorina Velazquez.     Jigar Perdomo, APRN - CNP, NP-C, FNP-BC

## 2023-02-07 NOTE — ASSESSMENT & PLAN NOTE
well controlled, no significant medication side effects noted and continue HCTZ   -Discussed taking medication and directed every day. -Discussed exercising daily 30 minutes 5 times a week for 150 minutes weekly.  -Discussed weight reduction if needed.  -Discussed low sodium diet.  -Discussed limiting caffeine consumption and tobacco cessation and the effects they have on the heart and blood pressure.

## 2023-02-07 NOTE — ASSESSMENT & PLAN NOTE
Worsening referral to sleep medicine home sleep study ordered,, she has been worked up by hematology and no reason found.  Patient does have symptoms of sleep apnea

## 2023-02-08 ENCOUNTER — TELEPHONE (OUTPATIENT)
Dept: PHYSICAL MEDICINE AND REHAB | Age: 68
End: 2023-02-08

## 2023-02-08 NOTE — TELEPHONE ENCOUNTER
Called and left message for follow up phone call post right first Aia 16 joint injection will await call back

## 2023-02-10 ENCOUNTER — OFFICE VISIT (OUTPATIENT)
Dept: PHYSICAL MEDICINE AND REHAB | Age: 68
End: 2023-02-10

## 2023-02-10 VITALS
BODY MASS INDEX: 33.31 KG/M2 | WEIGHT: 188 LBS | TEMPERATURE: 97.2 F | SYSTOLIC BLOOD PRESSURE: 148 MMHG | DIASTOLIC BLOOD PRESSURE: 83 MMHG | HEART RATE: 80 BPM | HEIGHT: 63 IN

## 2023-02-10 DIAGNOSIS — M79.609 PAIN IN EXTREMITY, UNSPECIFIED EXTREMITY: Primary | ICD-10-CM

## 2023-02-10 RX ORDER — LIDOCAINE HYDROCHLORIDE 10 MG/ML
4 INJECTION, SOLUTION INFILTRATION; PERINEURAL ONCE
Status: COMPLETED | OUTPATIENT
Start: 2023-02-10 | End: 2023-02-10

## 2023-02-10 RX ORDER — TRIAMCINOLONE ACETONIDE 40 MG/ML
40 INJECTION, SUSPENSION INTRA-ARTICULAR; INTRAMUSCULAR ONCE
Status: COMPLETED | OUTPATIENT
Start: 2023-02-10 | End: 2023-02-10

## 2023-02-10 RX ADMIN — LIDOCAINE HYDROCHLORIDE 4 ML: 10 INJECTION, SOLUTION INFILTRATION; PERINEURAL at 15:18

## 2023-02-10 RX ADMIN — TRIAMCINOLONE ACETONIDE 40 MG: 40 INJECTION, SUSPENSION INTRA-ARTICULAR; INTRAMUSCULAR at 15:18

## 2023-02-10 NOTE — PROGRESS NOTES
Roberta Pham D.O. [facility]  [address]  Phone: [phone]  Fax: [phone]    2/10/2023    Chief Complaint   Patient presents with    Hip Pain     Right SI  joint        Last injection: 9/27/22  Taking anticoagulants/antiplatelets: No  Diabetic: Yes  Febrile/active infection: No    Ambulatory Procedure Time Out  Correct Patient: Yes  Correct Procedure: Yes  Correct Site/Side: Yes  Correct Site(s) Marked: Yes  Informed Consent Signed: Yes  Allergies Verified: Yes  Staff Present & Credential[de-identified] Candice JOHNSON/     After explaining the indications, risks, benefits and alternatives of a right sacroiliac joint injection, the patient agreed to proceed. A permit was signed and scanned into the chart. The patient was placed in the prone position and draped for modesty. The skin on the Right upper buttock was prepared with Chloraprep. Using aseptic no touch technique, a 22 gauge, 3\" needle with 1 cc of Kenalog 40mg/cc and 1 cc of 1% lidocaine was directed to the joint using ultrasound guidance. After negative aspiration, the medication was injected. Adequate hemostasis was achieved and a bandage applied. The patient tolerated the procedure well and was educated in post injection care. The patient was clinically monitored after the injection and left the office without incident. There was post injection reduction in pain. Ultrasound images are scanned in the electronic medical record separately. Roberta Pham D.O., P.T.   Board Certified Physical Medicine and Rehabilitation  Board Certified Electrodiagnostic Medicine    Administrations This Visit       lidocaine 1 % injection 4 mL       Admin Date  02/10/2023  15:18 Action  Given Dose  4 mL Route  Other Site   Administered By  Chad Glover MA    Ordering Provider: DO Yudith Seaman 47: [id number]    Lot#: [id number]    : Gildardo Silverman Patient Supplied?: No              triamcinolone acetonide (KENALOG-40) injection 40 mg       Admin Date  02/10/2023  15:18 Action  Given Dose  40 mg Route  Intra-artICUlar Site   Administered By  Mayela Camarillo MA    Ordering Provider: Shwetha Selby DO    NDC: [id number]    Lot#: [id number]    : [employer] (PRIMARY CARE)    Patient Supplied?: No

## 2023-02-17 ENCOUNTER — TELEPHONE (OUTPATIENT)
Dept: PHYSICAL MEDICINE AND REHAB | Age: 68
End: 2023-02-17

## 2023-02-17 ENCOUNTER — TELEPHONE (OUTPATIENT)
Dept: SLEEP CENTER | Age: 68
End: 2023-02-17

## 2023-03-01 ENCOUNTER — TELEPHONE (OUTPATIENT)
Dept: SLEEP CENTER | Age: 68
End: 2023-03-01

## 2023-03-13 DIAGNOSIS — J43.9 PULMONARY EMPHYSEMA, UNSPECIFIED EMPHYSEMA TYPE (HCC): ICD-10-CM

## 2023-03-13 DIAGNOSIS — E11.9 TYPE 2 DIABETES MELLITUS WITHOUT COMPLICATION, WITHOUT LONG-TERM CURRENT USE OF INSULIN (HCC): ICD-10-CM

## 2023-03-13 RX ORDER — LIRAGLUTIDE 6 MG/ML
1.8 INJECTION SUBCUTANEOUS DAILY
Qty: 9 ML | Refills: 11 | OUTPATIENT
Start: 2023-03-13 | End: 2024-03-13

## 2023-03-15 ENCOUNTER — HOSPITAL ENCOUNTER (OUTPATIENT)
Dept: SLEEP CENTER | Age: 68
Discharge: HOME | End: 2023-03-15
Payer: MEDICARE

## 2023-03-15 DIAGNOSIS — D58.2 ELEVATED HEMOGLOBIN (HCC): ICD-10-CM

## 2023-03-15 DIAGNOSIS — G47.33 OSA (OBSTRUCTIVE SLEEP APNEA): ICD-10-CM

## 2023-03-15 PROCEDURE — 95800 SLP STDY UNATTENDED: CPT

## 2023-03-17 DIAGNOSIS — E11.9 TYPE 2 DIABETES MELLITUS WITHOUT COMPLICATION, WITHOUT LONG-TERM CURRENT USE OF INSULIN (HCC): ICD-10-CM

## 2023-03-17 RX ORDER — PEN NEEDLE, DIABETIC 32GX 5/32"
NEEDLE, DISPOSABLE MISCELLANEOUS
OUTPATIENT
Start: 2023-03-17

## 2023-03-31 DIAGNOSIS — G47.33 OSA (OBSTRUCTIVE SLEEP APNEA): Primary | ICD-10-CM

## 2023-03-31 DIAGNOSIS — G47.34 NOCTURNAL HYPOXEMIA: ICD-10-CM

## 2023-04-03 ENCOUNTER — TELEPHONE (OUTPATIENT)
Dept: SLEEP CENTER | Age: 68
End: 2023-04-03

## 2023-04-21 DIAGNOSIS — E78.2 MIXED HYPERLIPIDEMIA: ICD-10-CM

## 2023-04-21 DIAGNOSIS — E11.9 TYPE 2 DIABETES MELLITUS WITHOUT COMPLICATION, WITHOUT LONG-TERM CURRENT USE OF INSULIN (HCC): ICD-10-CM

## 2023-04-21 DIAGNOSIS — K21.00 GASTROESOPHAGEAL REFLUX DISEASE WITH ESOPHAGITIS WITHOUT HEMORRHAGE: ICD-10-CM

## 2023-04-21 DIAGNOSIS — F32.5 MAJOR DEPRESSIVE DISORDER WITH SINGLE EPISODE, IN FULL REMISSION (HCC): ICD-10-CM

## 2023-04-21 DIAGNOSIS — I10 ESSENTIAL HYPERTENSION: ICD-10-CM

## 2023-04-21 DIAGNOSIS — E89.0 POSTOPERATIVE HYPOTHYROIDISM: ICD-10-CM

## 2023-04-21 RX ORDER — EMPAGLIFLOZIN 25 MG/1
TABLET, FILM COATED ORAL
Qty: 90 TABLET | Refills: 1 | OUTPATIENT
Start: 2023-04-21

## 2023-04-21 RX ORDER — HYDROCHLOROTHIAZIDE 25 MG/1
TABLET ORAL
Qty: 90 TABLET | Refills: 3 | OUTPATIENT
Start: 2023-04-21

## 2023-04-21 RX ORDER — LIRAGLUTIDE 6 MG/ML
1.8 INJECTION SUBCUTANEOUS DAILY
Qty: 9 ML | Refills: 11 | OUTPATIENT
Start: 2023-04-21 | End: 2024-04-21

## 2023-04-21 RX ORDER — ROSUVASTATIN CALCIUM 40 MG/1
TABLET, COATED ORAL
Qty: 90 TABLET | Refills: 1 | OUTPATIENT
Start: 2023-04-21

## 2023-04-21 RX ORDER — LEVOTHYROXINE SODIUM 0.07 MG/1
75 TABLET ORAL DAILY
Qty: 90 TABLET | Refills: 3 | OUTPATIENT
Start: 2023-04-21

## 2023-04-21 RX ORDER — ROSUVASTATIN CALCIUM 40 MG/1
40 TABLET, COATED ORAL NIGHTLY
Qty: 90 TABLET | Refills: 1 | Status: SHIPPED | OUTPATIENT
Start: 2023-04-21

## 2023-04-21 RX ORDER — PANTOPRAZOLE SODIUM 40 MG/1
40 TABLET, DELAYED RELEASE ORAL DAILY
Qty: 90 TABLET | Refills: 3 | OUTPATIENT
Start: 2023-04-21

## 2023-04-21 RX ORDER — VENLAFAXINE HYDROCHLORIDE 150 MG/1
150 CAPSULE, EXTENDED RELEASE ORAL DAILY
Qty: 90 CAPSULE | Refills: 3 | OUTPATIENT
Start: 2023-04-21

## 2023-04-24 ENCOUNTER — TELEPHONE (OUTPATIENT)
Dept: SLEEP CENTER | Age: 68
End: 2023-04-24

## 2023-06-27 ENCOUNTER — OFFICE VISIT (OUTPATIENT)
Dept: FAMILY MEDICINE CLINIC | Age: 68
End: 2023-06-27
Payer: MEDICARE

## 2023-06-27 VITALS
DIASTOLIC BLOOD PRESSURE: 64 MMHG | BODY MASS INDEX: 31.57 KG/M2 | HEIGHT: 63 IN | SYSTOLIC BLOOD PRESSURE: 126 MMHG | HEART RATE: 87 BPM | RESPIRATION RATE: 16 BRPM | WEIGHT: 178.2 LBS | OXYGEN SATURATION: 91 % | TEMPERATURE: 97.3 F

## 2023-06-27 DIAGNOSIS — J43.9 PULMONARY EMPHYSEMA, UNSPECIFIED EMPHYSEMA TYPE (HCC): ICD-10-CM

## 2023-06-27 DIAGNOSIS — I10 ESSENTIAL HYPERTENSION: ICD-10-CM

## 2023-06-27 DIAGNOSIS — E78.2 MIXED HYPERLIPIDEMIA: ICD-10-CM

## 2023-06-27 DIAGNOSIS — G89.29 CHRONIC PAIN OF MULTIPLE JOINTS: ICD-10-CM

## 2023-06-27 DIAGNOSIS — E89.0 POSTOPERATIVE HYPOTHYROIDISM: ICD-10-CM

## 2023-06-27 DIAGNOSIS — F32.5 MAJOR DEPRESSIVE DISORDER WITH SINGLE EPISODE, IN FULL REMISSION (HCC): ICD-10-CM

## 2023-06-27 DIAGNOSIS — H90.3 SENSORINEURAL HEARING LOSS (SNHL) OF BOTH EARS: ICD-10-CM

## 2023-06-27 DIAGNOSIS — M25.50 CHRONIC PAIN OF MULTIPLE JOINTS: ICD-10-CM

## 2023-06-27 DIAGNOSIS — Z23 NEED FOR VACCINATION AGAINST STREPTOCOCCUS PNEUMONIAE: ICD-10-CM

## 2023-06-27 DIAGNOSIS — E11.9 TYPE 2 DIABETES MELLITUS WITHOUT COMPLICATION, WITHOUT LONG-TERM CURRENT USE OF INSULIN (HCC): Primary | ICD-10-CM

## 2023-06-27 DIAGNOSIS — G47.33 OSA (OBSTRUCTIVE SLEEP APNEA): ICD-10-CM

## 2023-06-27 LAB
CHP ED QC CHECK: ABNORMAL
GLUCOSE BLD-MCNC: 163 MG/DL
HBA1C MFR BLD: 6.8 %

## 2023-06-27 PROCEDURE — G0009 ADMIN PNEUMOCOCCAL VACCINE: HCPCS | Performed by: NURSE PRACTITIONER

## 2023-06-27 PROCEDURE — 2022F DILAT RTA XM EVC RTNOPTHY: CPT | Performed by: NURSE PRACTITIONER

## 2023-06-27 PROCEDURE — 3023F SPIROM DOC REV: CPT | Performed by: NURSE PRACTITIONER

## 2023-06-27 PROCEDURE — G8399 PT W/DXA RESULTS DOCUMENT: HCPCS | Performed by: NURSE PRACTITIONER

## 2023-06-27 PROCEDURE — 83037 HB GLYCOSYLATED A1C HOME DEV: CPT | Performed by: NURSE PRACTITIONER

## 2023-06-27 PROCEDURE — 3078F DIAST BP <80 MM HG: CPT | Performed by: NURSE PRACTITIONER

## 2023-06-27 PROCEDURE — 1123F ACP DISCUSS/DSCN MKR DOCD: CPT | Performed by: NURSE PRACTITIONER

## 2023-06-27 PROCEDURE — G8427 DOCREV CUR MEDS BY ELIG CLIN: HCPCS | Performed by: NURSE PRACTITIONER

## 2023-06-27 PROCEDURE — 3074F SYST BP LT 130 MM HG: CPT | Performed by: NURSE PRACTITIONER

## 2023-06-27 PROCEDURE — 3044F HG A1C LEVEL LT 7.0%: CPT | Performed by: NURSE PRACTITIONER

## 2023-06-27 PROCEDURE — 4004F PT TOBACCO SCREEN RCVD TLK: CPT | Performed by: NURSE PRACTITIONER

## 2023-06-27 PROCEDURE — 3017F COLORECTAL CA SCREEN DOC REV: CPT | Performed by: NURSE PRACTITIONER

## 2023-06-27 PROCEDURE — 99214 OFFICE O/P EST MOD 30 MIN: CPT | Performed by: NURSE PRACTITIONER

## 2023-06-27 PROCEDURE — G8417 CALC BMI ABV UP PARAM F/U: HCPCS | Performed by: NURSE PRACTITIONER

## 2023-06-27 PROCEDURE — 90677 PCV20 VACCINE IM: CPT | Performed by: NURSE PRACTITIONER

## 2023-06-27 PROCEDURE — 1090F PRES/ABSN URINE INCON ASSESS: CPT | Performed by: NURSE PRACTITIONER

## 2023-06-27 PROCEDURE — 82962 GLUCOSE BLOOD TEST: CPT | Performed by: NURSE PRACTITIONER

## 2023-06-27 RX ORDER — ROSUVASTATIN CALCIUM 40 MG/1
40 TABLET, COATED ORAL NIGHTLY
Qty: 90 TABLET | Refills: 0 | Status: SHIPPED | OUTPATIENT
Start: 2023-06-27

## 2023-06-27 RX ORDER — HYDROCHLOROTHIAZIDE 25 MG/1
TABLET ORAL
Qty: 90 TABLET | Refills: 0 | Status: SHIPPED | OUTPATIENT
Start: 2023-06-27

## 2023-06-27 RX ORDER — VENLAFAXINE HYDROCHLORIDE 75 MG/1
75 CAPSULE, EXTENDED RELEASE ORAL DAILY
Qty: 90 CAPSULE | Refills: 0 | Status: SHIPPED | OUTPATIENT
Start: 2023-06-27

## 2023-06-27 RX ORDER — VENLAFAXINE HYDROCHLORIDE 37.5 MG/1
37.5 CAPSULE, EXTENDED RELEASE ORAL DAILY
Qty: 90 CAPSULE | Refills: 0 | Status: SHIPPED | OUTPATIENT
Start: 2023-06-27

## 2023-06-27 RX ORDER — LEVOTHYROXINE SODIUM 0.07 MG/1
75 TABLET ORAL DAILY
Qty: 90 TABLET | Refills: 0 | Status: SHIPPED | OUTPATIENT
Start: 2023-06-27

## 2023-06-27 RX ORDER — LIRAGLUTIDE 6 MG/ML
1.8 INJECTION SUBCUTANEOUS DAILY
Qty: 9 ML | Refills: 11 | Status: SHIPPED | OUTPATIENT
Start: 2023-06-27 | End: 2024-06-27

## 2023-06-27 ASSESSMENT — ENCOUNTER SYMPTOMS
BACK PAIN: 0
CONSTIPATION: 0
SINUS PAIN: 0
FACIAL SWELLING: 0
APNEA: 1
COUGH: 1
SHORTNESS OF BREATH: 0
SORE THROAT: 0
ABDOMINAL PAIN: 0
NAUSEA: 0
DIARRHEA: 0
TROUBLE SWALLOWING: 0
WHEEZING: 0
COLOR CHANGE: 0
SINUS PRESSURE: 0
VOICE CHANGE: 0
CHEST TIGHTNESS: 0
VOMITING: 0
RHINORRHEA: 0

## 2023-07-19 ENCOUNTER — TELEPHONE (OUTPATIENT)
Dept: AUDIOLOGY | Age: 68
End: 2023-07-19

## 2023-07-19 NOTE — TELEPHONE ENCOUNTER
Referral for Hearing Evaluation received. Called patient and left a voicemail for the patient to call back for scheduling.     Electronically signed by Maci Magallon on 7/19/23 at 7:57 AM EDT

## 2023-07-27 DIAGNOSIS — E11.9 TYPE 2 DIABETES MELLITUS WITHOUT COMPLICATION, WITHOUT LONG-TERM CURRENT USE OF INSULIN (HCC): Primary | ICD-10-CM

## 2023-08-08 DIAGNOSIS — E11.9 TYPE 2 DIABETES MELLITUS WITHOUT COMPLICATION, WITHOUT LONG-TERM CURRENT USE OF INSULIN (HCC): Primary | ICD-10-CM

## 2023-08-08 RX ORDER — BLOOD-GLUCOSE METER
1 EACH MISCELLANEOUS DAILY
Qty: 1 EACH | Refills: 0 | Status: SHIPPED | OUTPATIENT
Start: 2023-08-08

## 2023-08-08 RX ORDER — GLUCOSAM/CHON-MSM1/C/MANG/BOSW 500-416.6
1 TABLET ORAL DAILY
Qty: 100 EACH | Refills: 3 | Status: SHIPPED | OUTPATIENT
Start: 2023-08-08

## 2023-08-08 RX ORDER — BLOOD SUGAR DIAGNOSTIC
1 STRIP MISCELLANEOUS DAILY
Qty: 100 EACH | Refills: 3 | Status: SHIPPED | OUTPATIENT
Start: 2023-08-08

## 2023-08-14 ENCOUNTER — TELEPHONE (OUTPATIENT)
Dept: AUDIOLOGY | Age: 68
End: 2023-08-14

## 2023-08-14 NOTE — TELEPHONE ENCOUNTER
Patient called and left a voicemail message. Returned patient message. Left a voicemail at the patient contact number. Left Audiology/ENT appointment information on voice mail message.     Electronically signed by Maci Law on 8/14/2023 at 8:03 AM

## 2023-08-17 DIAGNOSIS — M81.0 AGE-RELATED OSTEOPOROSIS WITHOUT CURRENT PATHOLOGICAL FRACTURE: ICD-10-CM

## 2023-08-17 RX ORDER — ALENDRONATE SODIUM 70 MG/1
TABLET ORAL
Qty: 12 TABLET | Refills: 3 | OUTPATIENT
Start: 2023-08-17

## 2023-08-26 DIAGNOSIS — E89.0 POSTOPERATIVE HYPOTHYROIDISM: ICD-10-CM

## 2023-08-26 DIAGNOSIS — K21.00 GASTROESOPHAGEAL REFLUX DISEASE WITH ESOPHAGITIS WITHOUT HEMORRHAGE: ICD-10-CM

## 2023-08-26 DIAGNOSIS — I10 ESSENTIAL HYPERTENSION: ICD-10-CM

## 2023-08-28 ENCOUNTER — PROCEDURE VISIT (OUTPATIENT)
Dept: AUDIOLOGY | Age: 68
End: 2023-08-28
Payer: MEDICARE

## 2023-08-28 ENCOUNTER — OFFICE VISIT (OUTPATIENT)
Dept: ENT CLINIC | Age: 68
End: 2023-08-28
Payer: MEDICARE

## 2023-08-28 VITALS
WEIGHT: 180.5 LBS | HEART RATE: 80 BPM | SYSTOLIC BLOOD PRESSURE: 135 MMHG | HEIGHT: 63 IN | BODY MASS INDEX: 31.98 KG/M2 | DIASTOLIC BLOOD PRESSURE: 82 MMHG

## 2023-08-28 DIAGNOSIS — R09.82 POST-NASAL DRAINAGE: ICD-10-CM

## 2023-08-28 DIAGNOSIS — J34.89 NASAL DRYNESS: ICD-10-CM

## 2023-08-28 DIAGNOSIS — H90.3 SENSORY HEARING LOSS, BILATERAL: Primary | ICD-10-CM

## 2023-08-28 DIAGNOSIS — H90.3 SENSORINEURAL HEARING LOSS (SNHL) OF BOTH EARS: Primary | ICD-10-CM

## 2023-08-28 PROCEDURE — 99203 OFFICE O/P NEW LOW 30 MIN: CPT | Performed by: NURSE PRACTITIONER

## 2023-08-28 PROCEDURE — 92557 COMPREHENSIVE HEARING TEST: CPT | Performed by: AUDIOLOGIST

## 2023-08-28 PROCEDURE — 1123F ACP DISCUSS/DSCN MKR DOCD: CPT | Performed by: NURSE PRACTITIONER

## 2023-08-28 PROCEDURE — 4004F PT TOBACCO SCREEN RCVD TLK: CPT | Performed by: NURSE PRACTITIONER

## 2023-08-28 PROCEDURE — 92567 TYMPANOMETRY: CPT | Performed by: AUDIOLOGIST

## 2023-08-28 PROCEDURE — 1090F PRES/ABSN URINE INCON ASSESS: CPT | Performed by: NURSE PRACTITIONER

## 2023-08-28 PROCEDURE — 3079F DIAST BP 80-89 MM HG: CPT | Performed by: NURSE PRACTITIONER

## 2023-08-28 PROCEDURE — G8427 DOCREV CUR MEDS BY ELIG CLIN: HCPCS | Performed by: NURSE PRACTITIONER

## 2023-08-28 PROCEDURE — 3017F COLORECTAL CA SCREEN DOC REV: CPT | Performed by: NURSE PRACTITIONER

## 2023-08-28 PROCEDURE — 3075F SYST BP GE 130 - 139MM HG: CPT | Performed by: NURSE PRACTITIONER

## 2023-08-28 PROCEDURE — G8399 PT W/DXA RESULTS DOCUMENT: HCPCS | Performed by: NURSE PRACTITIONER

## 2023-08-28 PROCEDURE — G8417 CALC BMI ABV UP PARAM F/U: HCPCS | Performed by: NURSE PRACTITIONER

## 2023-08-28 RX ORDER — HYDROCHLOROTHIAZIDE 25 MG/1
TABLET ORAL
Qty: 90 TABLET | Refills: 0 | OUTPATIENT
Start: 2023-08-28

## 2023-08-28 RX ORDER — AZELASTINE 1 MG/ML
1-2 SPRAY, METERED NASAL 2 TIMES DAILY PRN
Qty: 30 ML | Refills: 1 | Status: SHIPPED | OUTPATIENT
Start: 2023-08-28

## 2023-08-28 RX ORDER — PANTOPRAZOLE SODIUM 40 MG/1
TABLET, DELAYED RELEASE ORAL
Qty: 90 TABLET | Refills: 3 | OUTPATIENT
Start: 2023-08-28

## 2023-08-28 RX ORDER — ECHINACEA PURPUREA EXTRACT 125 MG
2 TABLET ORAL 4 TIMES DAILY
Qty: 3 EACH | Refills: 3 | Status: SHIPPED | OUTPATIENT
Start: 2023-08-28

## 2023-08-28 RX ORDER — LEVOTHYROXINE SODIUM 0.07 MG/1
TABLET ORAL
Qty: 90 TABLET | Refills: 0 | OUTPATIENT
Start: 2023-08-28

## 2023-08-28 ASSESSMENT — ENCOUNTER SYMPTOMS
SHORTNESS OF BREATH: 0
SINUS PRESSURE: 0
EYES NEGATIVE: 1
SINUS PAIN: 0
RHINORRHEA: 0
STRIDOR: 0
RESPIRATORY NEGATIVE: 1

## 2023-08-28 NOTE — PROGRESS NOTES
This patient was referred for audiometric and tympanometric testing by JEB Maxwell due to hearing loss. Audiometry using pure tone air and bone conduction testing revealed a mild-to-severe  sensorineural hearing loss, bilaterally. Reliability was good. Speech reception thresholds were in good agreement with the pure tone averages, bilaterally. Speech discrimination scores were good, bilaterally. Tympanometry revealed normal middle ear peak pressure and compliance, bilaterally. The results were reviewed with the patient. Hearing aids were discussed. She has [employer], hearing aid benefit sheet was reviewed, signed and scanned. Other recommendations for follow up will be made pending physician consult.     Electronically signed by Maci Allen on 8/28/2023 at 3:28 PM

## 2023-09-05 ENCOUNTER — OFFICE VISIT (OUTPATIENT)
Dept: PHYSICAL MEDICINE AND REHAB | Age: 68
End: 2023-09-05
Payer: MEDICARE

## 2023-09-05 VITALS
HEART RATE: 68 BPM | SYSTOLIC BLOOD PRESSURE: 118 MMHG | HEIGHT: 63 IN | DIASTOLIC BLOOD PRESSURE: 78 MMHG | BODY MASS INDEX: 31.89 KG/M2 | WEIGHT: 180 LBS

## 2023-09-05 DIAGNOSIS — M18.11 PRIMARY OSTEOARTHRITIS OF FIRST CARPOMETACARPAL JOINT OF RIGHT HAND: ICD-10-CM

## 2023-09-05 DIAGNOSIS — M54.17 LUMBOSACRAL RADICULITIS: ICD-10-CM

## 2023-09-05 DIAGNOSIS — M18.12 PRIMARY OSTEOARTHRITIS OF FIRST CARPOMETACARPAL JOINT OF LEFT HAND: Primary | ICD-10-CM

## 2023-09-05 PROCEDURE — G8427 DOCREV CUR MEDS BY ELIG CLIN: HCPCS | Performed by: PHYSICAL MEDICINE & REHABILITATION

## 2023-09-05 PROCEDURE — 3074F SYST BP LT 130 MM HG: CPT | Performed by: PHYSICAL MEDICINE & REHABILITATION

## 2023-09-05 PROCEDURE — 4004F PT TOBACCO SCREEN RCVD TLK: CPT | Performed by: PHYSICAL MEDICINE & REHABILITATION

## 2023-09-05 PROCEDURE — 99214 OFFICE O/P EST MOD 30 MIN: CPT | Performed by: PHYSICAL MEDICINE & REHABILITATION

## 2023-09-05 PROCEDURE — 1090F PRES/ABSN URINE INCON ASSESS: CPT | Performed by: PHYSICAL MEDICINE & REHABILITATION

## 2023-09-05 PROCEDURE — 3078F DIAST BP <80 MM HG: CPT | Performed by: PHYSICAL MEDICINE & REHABILITATION

## 2023-09-05 PROCEDURE — 1123F ACP DISCUSS/DSCN MKR DOCD: CPT | Performed by: PHYSICAL MEDICINE & REHABILITATION

## 2023-09-05 PROCEDURE — G8399 PT W/DXA RESULTS DOCUMENT: HCPCS | Performed by: PHYSICAL MEDICINE & REHABILITATION

## 2023-09-05 PROCEDURE — 3017F COLORECTAL CA SCREEN DOC REV: CPT | Performed by: PHYSICAL MEDICINE & REHABILITATION

## 2023-09-05 PROCEDURE — G8417 CALC BMI ABV UP PARAM F/U: HCPCS | Performed by: PHYSICAL MEDICINE & REHABILITATION

## 2023-09-05 RX ORDER — DULOXETIN HYDROCHLORIDE 20 MG/1
CAPSULE, DELAYED RELEASE ORAL
Qty: 9 CAPSULE | Refills: 0 | Status: SHIPPED | OUTPATIENT
Start: 2023-09-05 | End: 2023-09-11

## 2023-09-05 RX ORDER — DULOXETIN HYDROCHLORIDE 60 MG/1
60 CAPSULE, DELAYED RELEASE ORAL DAILY
Qty: 30 CAPSULE | Refills: 0 | Status: SHIPPED | OUTPATIENT
Start: 2023-09-05 | End: 2023-10-05

## 2023-09-05 RX ORDER — DICLOFENAC SODIUM AND MISOPROSTOL 75; 200 MG/1; UG/1
1 TABLET, DELAYED RELEASE ORAL 2 TIMES DAILY PRN
Qty: 60 TABLET | Refills: 2 | Status: SHIPPED | OUTPATIENT
Start: 2023-09-05 | End: 2023-12-04

## 2023-09-05 NOTE — PROGRESS NOTES
Cecilia Gama D.O. [facility]  [address], [address] [place] [address]  Phone: [phone]  Fax: [phone]    Chief Complaint   Patient presents with    Hand Pain     FOLLOW UP        An independent historian was not needed. Interval history: patient here to follow up on bilateral thumb and low back/hip pain. She had R SI joint injection at last visit 2/10/23 and reports no relief from that injection. She also previously had right first [facility] joint injection 1/31/23 which provided no relief. She has seen Dr. Etienne Tracey for first [facility] OA and his notes were reviewed. Side effects of treatment: has none. Symptoms have been worsening. The patient has completed the following treatments since last seen: NSAID-ibuprofen. Today, the location of pain is bilateral basilar thumb worse on right, low back radiating to right thigh and leg and the severity is Pain Score:   8. The quality is aching. The frequency is constant daily. Alleviating factors include: ibuprofen. Exacerbating factors include:  activity    Functional status:   ADL: Self-care  Mobility: independence Device: None  Lives with daughter    Exercise: daily walking and swimming    Red flags: Not present: history of cancer, pain awakening patient from sleep, night sweats, fevers, unintentional weight loss, immunospuression, saddle anesthesia, new bowel or bladder dysfunction, and osteoporosis. Associated symptoms: Not present: falls, subjective weakness, hematuria, nausea, vomiting or rash. Present: paresthesias right leg    Data reviewed/prior work up:   Review of external notes:   Dr. Conrad Wan office notes were reviewed.    Review of labs:   Lab Results   Component Value Date     01/31/2023    K 4.1 01/31/2023    CL 98 01/31/2023    CO2 29 01/31/2023    BUN 13 01/31/2023    CREATININE 0.9 01/31/2023    GLUCOSE 163 06/27/2023    CALCIUM 9.8 01/31/2023    PROT 7.6

## 2023-09-05 NOTE — PATIENT INSTRUCTIONS
Start Cymbalta 20 mg now and stop Effexor 27.5 mg. Take Cymbalta 20 mg for three days then increase to 40 mg of Cymbalta daily for three days. After three days stop Effexor 75 mg and start Cymbalta 60 daily after that.     Stop Ibuprofen and start Arthrotec

## 2023-09-09 DIAGNOSIS — F32.5 MAJOR DEPRESSIVE DISORDER WITH SINGLE EPISODE, IN FULL REMISSION (HCC): ICD-10-CM

## 2023-09-09 DIAGNOSIS — E78.2 MIXED HYPERLIPIDEMIA: ICD-10-CM

## 2023-09-11 RX ORDER — VENLAFAXINE HYDROCHLORIDE 37.5 MG/1
CAPSULE, EXTENDED RELEASE ORAL
Qty: 90 CAPSULE | Refills: 0 | OUTPATIENT
Start: 2023-09-11

## 2023-09-11 RX ORDER — VENLAFAXINE HYDROCHLORIDE 75 MG/1
CAPSULE, EXTENDED RELEASE ORAL
Qty: 90 CAPSULE | Refills: 0 | OUTPATIENT
Start: 2023-09-11

## 2023-09-11 RX ORDER — DULOXETIN HYDROCHLORIDE 20 MG/1
CAPSULE, DELAYED RELEASE ORAL
Qty: 9 CAPSULE | Refills: 0 | OUTPATIENT
Start: 2023-09-11

## 2023-09-11 RX ORDER — ROSUVASTATIN CALCIUM 40 MG/1
TABLET, COATED ORAL
Qty: 90 TABLET | Refills: 0 | OUTPATIENT
Start: 2023-09-11

## 2023-09-29 ENCOUNTER — TELEPHONE (OUTPATIENT)
Dept: PHYSICAL MEDICINE AND REHAB | Age: 68
End: 2023-09-29

## 2023-09-29 DIAGNOSIS — M53.3 SACROILIAC DYSFUNCTION: Primary | ICD-10-CM

## 2023-09-29 NOTE — TELEPHONE ENCOUNTER
----- Message from Johnson Salinas DO sent at 9/29/2023 10:12 AM EDT -----  Reviewed test abnormal, inform patient that it showed degenerative changes.

## 2023-10-06 RX ORDER — DULOXETIN HYDROCHLORIDE 60 MG/1
60 CAPSULE, DELAYED RELEASE ORAL DAILY
Qty: 15 CAPSULE | Refills: 0 | Status: SHIPPED | OUTPATIENT
Start: 2023-10-06 | End: 2023-10-21

## 2023-10-06 RX ORDER — DULOXETIN HYDROCHLORIDE 60 MG/1
CAPSULE, DELAYED RELEASE ORAL
Qty: 30 CAPSULE | Refills: 0 | OUTPATIENT
Start: 2023-10-06

## 2023-10-06 NOTE — TELEPHONE ENCOUNTER
Patient called back and she only has a week supply of cymbalta 60 mg 1 tab qd sent 9-5-23 #30 no refill. Patient last visit 9-5-23 next visit 10-30-23. Keyed up enough medication to last till next visit. Please advise.

## 2023-10-25 RX ORDER — DICLOFENAC SODIUM AND MISOPROSTOL 75; 200 MG/1; UG/1
1 TABLET, DELAYED RELEASE ORAL 2 TIMES DAILY PRN
Qty: 60 TABLET | Refills: 2 | Status: SHIPPED | OUTPATIENT
Start: 2023-10-25 | End: 2024-01-23

## 2023-10-25 NOTE — TELEPHONE ENCOUNTER
Patient last visit 9-5-23 next visit 10-30-23. Patient requesting a refill on arthrotec 75-0.2 mg 1 tab bid prn #60 2 refills sent 9-5-23. Please advise.

## 2023-10-30 ENCOUNTER — OFFICE VISIT (OUTPATIENT)
Dept: PHYSICAL MEDICINE AND REHAB | Age: 68
End: 2023-10-30
Payer: MEDICARE

## 2023-10-30 ENCOUNTER — OFFICE VISIT (OUTPATIENT)
Dept: FAMILY MEDICINE CLINIC | Age: 68
End: 2023-10-30
Payer: MEDICARE

## 2023-10-30 VITALS
BODY MASS INDEX: 31.71 KG/M2 | HEIGHT: 63 IN | DIASTOLIC BLOOD PRESSURE: 62 MMHG | OXYGEN SATURATION: 93 % | WEIGHT: 179 LBS | HEART RATE: 74 BPM | SYSTOLIC BLOOD PRESSURE: 116 MMHG | RESPIRATION RATE: 18 BRPM | TEMPERATURE: 97.2 F

## 2023-10-30 VITALS
HEIGHT: 63 IN | DIASTOLIC BLOOD PRESSURE: 70 MMHG | WEIGHT: 179 LBS | SYSTOLIC BLOOD PRESSURE: 124 MMHG | BODY MASS INDEX: 31.71 KG/M2

## 2023-10-30 DIAGNOSIS — E89.0 POSTOPERATIVE HYPOTHYROIDISM: ICD-10-CM

## 2023-10-30 DIAGNOSIS — E78.2 MIXED HYPERLIPIDEMIA: ICD-10-CM

## 2023-10-30 DIAGNOSIS — Z80.3 FHX: BREAST CANCER IN FIRST DEGREE RELATIVE: ICD-10-CM

## 2023-10-30 DIAGNOSIS — K21.00 GASTROESOPHAGEAL REFLUX DISEASE WITH ESOPHAGITIS WITHOUT HEMORRHAGE: ICD-10-CM

## 2023-10-30 DIAGNOSIS — M81.0 AGE-RELATED OSTEOPOROSIS WITHOUT CURRENT PATHOLOGICAL FRACTURE: ICD-10-CM

## 2023-10-30 DIAGNOSIS — M53.3 SACROILIAC DYSFUNCTION: ICD-10-CM

## 2023-10-30 DIAGNOSIS — E11.9 TYPE 2 DIABETES MELLITUS WITHOUT COMPLICATION, WITHOUT LONG-TERM CURRENT USE OF INSULIN (HCC): Primary | ICD-10-CM

## 2023-10-30 DIAGNOSIS — M18.12 PRIMARY OSTEOARTHRITIS OF FIRST CARPOMETACARPAL JOINT OF LEFT HAND: Primary | ICD-10-CM

## 2023-10-30 DIAGNOSIS — Z12.31 SCREENING MAMMOGRAM FOR HIGH-RISK PATIENT: ICD-10-CM

## 2023-10-30 DIAGNOSIS — Z23 FLU VACCINE NEED: ICD-10-CM

## 2023-10-30 DIAGNOSIS — I10 ESSENTIAL HYPERTENSION: ICD-10-CM

## 2023-10-30 DIAGNOSIS — M54.17 LUMBOSACRAL RADICULITIS: ICD-10-CM

## 2023-10-30 DIAGNOSIS — J43.9 PULMONARY EMPHYSEMA, UNSPECIFIED EMPHYSEMA TYPE (HCC): ICD-10-CM

## 2023-10-30 DIAGNOSIS — M18.11 PRIMARY OSTEOARTHRITIS OF FIRST CARPOMETACARPAL JOINT OF RIGHT HAND: ICD-10-CM

## 2023-10-30 LAB
CHP ED QC CHECK: NORMAL
GLUCOSE BLD-MCNC: 126 MG/DL
HBA1C MFR BLD: 7 %

## 2023-10-30 PROCEDURE — 2022F DILAT RTA XM EVC RTNOPTHY: CPT | Performed by: NURSE PRACTITIONER

## 2023-10-30 PROCEDURE — 3074F SYST BP LT 130 MM HG: CPT | Performed by: PHYSICAL MEDICINE & REHABILITATION

## 2023-10-30 PROCEDURE — 90694 VACC AIIV4 NO PRSRV 0.5ML IM: CPT | Performed by: NURSE PRACTITIONER

## 2023-10-30 PROCEDURE — 99214 OFFICE O/P EST MOD 30 MIN: CPT | Performed by: PHYSICAL MEDICINE & REHABILITATION

## 2023-10-30 PROCEDURE — G8399 PT W/DXA RESULTS DOCUMENT: HCPCS | Performed by: NURSE PRACTITIONER

## 2023-10-30 PROCEDURE — 4004F PT TOBACCO SCREEN RCVD TLK: CPT | Performed by: PHYSICAL MEDICINE & REHABILITATION

## 2023-10-30 PROCEDURE — G8399 PT W/DXA RESULTS DOCUMENT: HCPCS | Performed by: PHYSICAL MEDICINE & REHABILITATION

## 2023-10-30 PROCEDURE — 3023F SPIROM DOC REV: CPT | Performed by: NURSE PRACTITIONER

## 2023-10-30 PROCEDURE — 1123F ACP DISCUSS/DSCN MKR DOCD: CPT | Performed by: PHYSICAL MEDICINE & REHABILITATION

## 2023-10-30 PROCEDURE — 3074F SYST BP LT 130 MM HG: CPT | Performed by: NURSE PRACTITIONER

## 2023-10-30 PROCEDURE — 3051F HG A1C>EQUAL 7.0%<8.0%: CPT | Performed by: NURSE PRACTITIONER

## 2023-10-30 PROCEDURE — G8427 DOCREV CUR MEDS BY ELIG CLIN: HCPCS | Performed by: NURSE PRACTITIONER

## 2023-10-30 PROCEDURE — 1090F PRES/ABSN URINE INCON ASSESS: CPT | Performed by: NURSE PRACTITIONER

## 2023-10-30 PROCEDURE — 3017F COLORECTAL CA SCREEN DOC REV: CPT | Performed by: PHYSICAL MEDICINE & REHABILITATION

## 2023-10-30 PROCEDURE — G8484 FLU IMMUNIZE NO ADMIN: HCPCS | Performed by: NURSE PRACTITIONER

## 2023-10-30 PROCEDURE — 99214 OFFICE O/P EST MOD 30 MIN: CPT | Performed by: NURSE PRACTITIONER

## 2023-10-30 PROCEDURE — G8417 CALC BMI ABV UP PARAM F/U: HCPCS | Performed by: NURSE PRACTITIONER

## 2023-10-30 PROCEDURE — G8427 DOCREV CUR MEDS BY ELIG CLIN: HCPCS | Performed by: PHYSICAL MEDICINE & REHABILITATION

## 2023-10-30 PROCEDURE — 1090F PRES/ABSN URINE INCON ASSESS: CPT | Performed by: PHYSICAL MEDICINE & REHABILITATION

## 2023-10-30 PROCEDURE — 3017F COLORECTAL CA SCREEN DOC REV: CPT | Performed by: NURSE PRACTITIONER

## 2023-10-30 PROCEDURE — 3078F DIAST BP <80 MM HG: CPT | Performed by: PHYSICAL MEDICINE & REHABILITATION

## 2023-10-30 PROCEDURE — 4004F PT TOBACCO SCREEN RCVD TLK: CPT | Performed by: NURSE PRACTITIONER

## 2023-10-30 PROCEDURE — 82962 GLUCOSE BLOOD TEST: CPT | Performed by: NURSE PRACTITIONER

## 2023-10-30 PROCEDURE — 1123F ACP DISCUSS/DSCN MKR DOCD: CPT | Performed by: NURSE PRACTITIONER

## 2023-10-30 PROCEDURE — G8484 FLU IMMUNIZE NO ADMIN: HCPCS | Performed by: PHYSICAL MEDICINE & REHABILITATION

## 2023-10-30 PROCEDURE — 83036 HEMOGLOBIN GLYCOSYLATED A1C: CPT | Performed by: NURSE PRACTITIONER

## 2023-10-30 PROCEDURE — G8417 CALC BMI ABV UP PARAM F/U: HCPCS | Performed by: PHYSICAL MEDICINE & REHABILITATION

## 2023-10-30 PROCEDURE — 3078F DIAST BP <80 MM HG: CPT | Performed by: NURSE PRACTITIONER

## 2023-10-30 RX ORDER — HYDROCHLOROTHIAZIDE 25 MG/1
TABLET ORAL
Qty: 90 TABLET | Refills: 0 | Status: SHIPPED | OUTPATIENT
Start: 2023-10-30

## 2023-10-30 RX ORDER — ROSUVASTATIN CALCIUM 40 MG/1
40 TABLET, COATED ORAL NIGHTLY
Qty: 90 TABLET | Refills: 0 | Status: SHIPPED | OUTPATIENT
Start: 2023-10-30

## 2023-10-30 RX ORDER — ALENDRONATE SODIUM 70 MG/1
70 TABLET ORAL
Qty: 12 TABLET | Refills: 3 | Status: SHIPPED | OUTPATIENT
Start: 2023-10-30

## 2023-10-30 RX ORDER — PANTOPRAZOLE SODIUM 40 MG/1
40 TABLET, DELAYED RELEASE ORAL DAILY
Qty: 90 TABLET | Refills: 3 | Status: SHIPPED | OUTPATIENT
Start: 2023-10-30

## 2023-10-30 RX ORDER — NORTRIPTYLINE HYDROCHLORIDE 10 MG/1
10 CAPSULE ORAL NIGHTLY
Qty: 90 CAPSULE | Refills: 3 | Status: SHIPPED | OUTPATIENT
Start: 2023-10-30 | End: 2024-10-24

## 2023-10-30 RX ORDER — PEN NEEDLE, DIABETIC 32GX 5/32"
1 NEEDLE, DISPOSABLE MISCELLANEOUS DAILY
Qty: 100 EACH | Refills: 4 | Status: SHIPPED | OUTPATIENT
Start: 2023-10-30

## 2023-10-30 RX ORDER — DULOXETIN HYDROCHLORIDE 60 MG/1
60 CAPSULE, DELAYED RELEASE ORAL DAILY
Qty: 90 CAPSULE | Refills: 3 | Status: SHIPPED | OUTPATIENT
Start: 2023-10-30 | End: 2024-10-24

## 2023-10-30 RX ORDER — LEVOTHYROXINE SODIUM 0.07 MG/1
75 TABLET ORAL DAILY
Qty: 90 TABLET | Refills: 0 | Status: SHIPPED | OUTPATIENT
Start: 2023-10-30

## 2023-10-30 SDOH — ECONOMIC STABILITY: FOOD INSECURITY: WITHIN THE PAST 12 MONTHS, YOU WORRIED THAT YOUR FOOD WOULD RUN OUT BEFORE YOU GOT MONEY TO BUY MORE.: NEVER TRUE

## 2023-10-30 SDOH — ECONOMIC STABILITY: FOOD INSECURITY: WITHIN THE PAST 12 MONTHS, THE FOOD YOU BOUGHT JUST DIDN'T LAST AND YOU DIDN'T HAVE MONEY TO GET MORE.: NEVER TRUE

## 2023-10-30 SDOH — ECONOMIC STABILITY: INCOME INSECURITY: HOW HARD IS IT FOR YOU TO PAY FOR THE VERY BASICS LIKE FOOD, HOUSING, MEDICAL CARE, AND HEATING?: NOT HARD AT ALL

## 2023-10-30 ASSESSMENT — ENCOUNTER SYMPTOMS
FACIAL SWELLING: 0
BACK PAIN: 0
SHORTNESS OF BREATH: 0
DIARRHEA: 0
SINUS PRESSURE: 0
CONSTIPATION: 0
VOICE CHANGE: 0
SINUS PAIN: 0
COUGH: 0
COLOR CHANGE: 0
SORE THROAT: 0
NAUSEA: 0
ABDOMINAL PAIN: 0
VOMITING: 0
WHEEZING: 0
RHINORRHEA: 0
TROUBLE SWALLOWING: 0
CHEST TIGHTNESS: 0

## 2023-10-30 NOTE — ASSESSMENT & PLAN NOTE
well controlled, no significant side effects from medication noted. HCTz 25 mg dialy labs reviewed:CMP, CBCD, Lipids, TSH, FT4,   -Discussed taking medication as directed every day around the same time. Do not double up if skipped or missed doses. -Discussed exercising daily 30 minutes 5 times a week for 150 minutes weekly to help with stress reduction and weight reduction if needed.  -Discussed low sodium diet.  -Discussed limiting caffeine consumption and tobacco cessation and the effects they have on the heart and blood pressure.

## 2023-10-30 NOTE — ASSESSMENT & PLAN NOTE
reasonably well controlled and no significant medication side effects noted A1c 7.0  %, eGFR >60  continue Farxiga 10 mg every other day about a month ago,  victoza 1.8 mg daily. fasting labs ordered,  reviewed CMP, CBCD H/H worsening secondary to smoking, lipids stable  BS log reviewed with patient   Monitor BS at different times: 1 day fasting, then next day 2 hours after lunch, next day 2 hours after dinner, next day at bedtime then start over and log all values. Bring log to next appointment  Discussed hypoglycemia prevention and treatment, take medication regularly don't skip doses or double up. Foot exam every day; wash and dry well between toes, look for any redness, cracks, wounds notify provider if any problems occur. Be sure to wear hard sole shoe at all times and not go barefooted. Wear proper fitting shoes to prevent ulcer, callous formation. Reminder for Podiatry visits Q 2 Months for toenail care if needed  Reminder for annual Eye exam  Reminder to keep vaccines up dated  Exercise 30 minutes daily for 150 minutes a week  Recommend Diabetic Education Classes if you have not already attended  Stay up to date on all immunizations.

## 2023-10-30 NOTE — ASSESSMENT & PLAN NOTE
well controlled and no significant medication side effects noted, Continue Crestor 40 mg nightly,  labs reviewed Lipid, CMP, CBCD,   -Discussed low fat diet, limit fast food, goodies, breads and pastas if consuming several days a week,  limit alcohol consumption as this combined with statin can cause liver problems.  -Discussed weight reduction and exercise 30 minutes 5 days a week for total of 150 minutes weekly.  -Discussed CoQ10 as directed  -Discussed if any unusual muscle aching/pain to contact the office, discussed medication and risk of muscle pain/damage from Rhabdomyolysis. -Discussed repeat labs in  This week.

## 2023-11-02 DIAGNOSIS — E89.0 POSTOPERATIVE HYPOTHYROIDISM: ICD-10-CM

## 2023-11-02 DIAGNOSIS — I10 ESSENTIAL HYPERTENSION: ICD-10-CM

## 2023-11-02 RX ORDER — HYDROCHLOROTHIAZIDE 25 MG/1
TABLET ORAL
Qty: 90 TABLET | Refills: 10 | OUTPATIENT
Start: 2023-11-02

## 2023-11-02 RX ORDER — LEVOTHYROXINE SODIUM 0.07 MG/1
TABLET ORAL
Qty: 90 TABLET | Refills: 10 | OUTPATIENT
Start: 2023-11-02

## 2023-11-04 ENCOUNTER — HOSPITAL ENCOUNTER (OUTPATIENT)
Age: 68
Discharge: HOME | End: 2023-11-04
Payer: MEDICARE

## 2023-11-04 DIAGNOSIS — E89.0 POSTOPERATIVE HYPOTHYROIDISM: ICD-10-CM

## 2023-11-04 DIAGNOSIS — E78.2 MIXED HYPERLIPIDEMIA: ICD-10-CM

## 2023-11-04 DIAGNOSIS — I10 ESSENTIAL HYPERTENSION: ICD-10-CM

## 2023-11-04 DIAGNOSIS — E11.9 TYPE 2 DIABETES MELLITUS WITHOUT COMPLICATION, WITHOUT LONG-TERM CURRENT USE OF INSULIN (HCC): ICD-10-CM

## 2023-11-04 LAB
ALBUMIN SERPL-MCNC: 4 G/DL (ref 3.5–5.2)
ALP SERPL-CCNC: 60 U/L (ref 35–104)
ALT SERPL-CCNC: 12 U/L (ref 0–32)
ANION GAP SERPL CALCULATED.3IONS-SCNC: 9 MMOL/L (ref 7–16)
AST SERPL-CCNC: 16 U/L (ref 0–31)
BASOPHILS # BLD: 0.05 K/UL (ref 0–0.2)
BASOPHILS NFR BLD: 1 % (ref 0–2)
BILIRUB SERPL-MCNC: 0.7 MG/DL (ref 0–1.2)
BUN SERPL-MCNC: 14 MG/DL (ref 6–23)
CALCIUM SERPL-MCNC: 9.2 MG/DL (ref 8.6–10.2)
CHLORIDE SERPL-SCNC: 101 MMOL/L (ref 98–107)
CHOLEST SERPL-MCNC: 141 MG/DL
CO2 SERPL-SCNC: 29 MMOL/L (ref 22–29)
CREAT SERPL-MCNC: 0.9 MG/DL (ref 0.5–1)
EOSINOPHIL # BLD: 0.12 K/UL (ref 0.05–0.5)
EOSINOPHILS RELATIVE PERCENT: 2 % (ref 0–6)
ERYTHROCYTE [DISTWIDTH] IN BLOOD BY AUTOMATED COUNT: 15.1 % (ref 11.5–15)
GFR SERPL CREATININE-BSD FRML MDRD: >60 ML/MIN/1.73M2
GLUCOSE SERPL-MCNC: 133 MG/DL (ref 74–99)
HCT VFR BLD AUTO: 49.1 % (ref 34–48)
HDLC SERPL-MCNC: 45 MG/DL
HGB BLD-MCNC: 16.4 G/DL (ref 11.5–15.5)
IMM GRANULOCYTES # BLD AUTO: <0.03 K/UL (ref 0–0.58)
IMM GRANULOCYTES NFR BLD: 0 % (ref 0–5)
LDLC SERPL CALC-MCNC: 68 MG/DL
LYMPHOCYTES NFR BLD: 1.7 K/UL (ref 1.5–4)
LYMPHOCYTES RELATIVE PERCENT: 21 % (ref 20–42)
MCH RBC QN AUTO: 30 PG (ref 26–35)
MCHC RBC AUTO-ENTMCNC: 33.4 G/DL (ref 32–34.5)
MCV RBC AUTO: 89.9 FL (ref 80–99.9)
MONOCYTES NFR BLD: 0.69 K/UL (ref 0.1–0.95)
MONOCYTES NFR BLD: 9 % (ref 2–12)
NEUTROPHILS NFR BLD: 68 % (ref 43–80)
NEUTS SEG NFR BLD: 5.52 K/UL (ref 1.8–7.3)
PLATELET # BLD AUTO: 286 K/UL (ref 130–450)
PMV BLD AUTO: 9.4 FL (ref 7–12)
POTASSIUM SERPL-SCNC: 4 MMOL/L (ref 3.5–5)
PROT SERPL-MCNC: 6.7 G/DL (ref 6.4–8.3)
RBC # BLD AUTO: 5.46 M/UL (ref 3.5–5.5)
SODIUM SERPL-SCNC: 139 MMOL/L (ref 132–146)
T4 FREE SERPL-MCNC: 1.2 NG/DL (ref 0.9–1.7)
TRIGL SERPL-MCNC: 141 MG/DL
TSH SERPL DL<=0.05 MIU/L-ACNC: 1.37 UIU/ML (ref 0.27–4.2)
VLDLC SERPL CALC-MCNC: 28 MG/DL
WBC OTHER # BLD: 8.1 K/UL (ref 4.5–11.5)

## 2023-11-04 PROCEDURE — 80053 COMPREHEN METABOLIC PANEL: CPT

## 2023-11-04 PROCEDURE — 84439 ASSAY OF FREE THYROXINE: CPT

## 2023-11-04 PROCEDURE — 85025 COMPLETE CBC W/AUTO DIFF WBC: CPT

## 2023-11-04 PROCEDURE — 80061 LIPID PANEL: CPT

## 2023-11-04 PROCEDURE — 84443 ASSAY THYROID STIM HORMONE: CPT

## 2023-12-11 RX ORDER — DICLOFENAC SODIUM AND MISOPROSTOL 75; 200 MG/1; UG/1
1 TABLET, DELAYED RELEASE ORAL 2 TIMES DAILY PRN
Qty: 60 TABLET | Refills: 2 | OUTPATIENT
Start: 2023-12-11 | End: 2024-03-10

## 2023-12-14 ENCOUNTER — HOSPITAL ENCOUNTER (OUTPATIENT)
Dept: MAMMOGRAPHY | Age: 68
Discharge: HOME | End: 2023-12-16

## 2023-12-14 DIAGNOSIS — Z12.31 SCREENING MAMMOGRAM FOR HIGH-RISK PATIENT: ICD-10-CM

## 2023-12-14 DIAGNOSIS — Z80.3 FHX: BREAST CANCER IN FIRST DEGREE RELATIVE: ICD-10-CM

## 2023-12-14 RX ORDER — DICLOFENAC SODIUM AND MISOPROSTOL 75; 200 MG/1; UG/1
1 TABLET, DELAYED RELEASE ORAL 2 TIMES DAILY PRN
Qty: 60 TABLET | Refills: 2 | OUTPATIENT
Start: 2023-12-14 | End: 2024-03-13

## 2024-01-08 DIAGNOSIS — E89.0 POSTOPERATIVE HYPOTHYROIDISM: ICD-10-CM

## 2024-01-08 DIAGNOSIS — I10 ESSENTIAL HYPERTENSION: ICD-10-CM

## 2024-01-08 DIAGNOSIS — E78.2 MIXED HYPERLIPIDEMIA: ICD-10-CM

## 2024-01-08 RX ORDER — LEVOTHYROXINE SODIUM 0.07 MG/1
TABLET ORAL
Qty: 90 TABLET | Refills: 3 | OUTPATIENT
Start: 2024-01-08

## 2024-01-08 RX ORDER — ROSUVASTATIN CALCIUM 40 MG/1
TABLET, COATED ORAL
Qty: 90 TABLET | Refills: 3 | OUTPATIENT
Start: 2024-01-08

## 2024-01-08 RX ORDER — HYDROCHLOROTHIAZIDE 25 MG/1
TABLET ORAL
Qty: 90 TABLET | Refills: 3 | OUTPATIENT
Start: 2024-01-08

## 2024-01-31 ENCOUNTER — OFFICE VISIT (OUTPATIENT)
Dept: FAMILY MEDICINE CLINIC | Age: 69
End: 2024-01-31
Payer: MEDICARE

## 2024-01-31 VITALS
TEMPERATURE: 97.2 F | WEIGHT: 179.5 LBS | HEART RATE: 75 BPM | HEIGHT: 63 IN | RESPIRATION RATE: 16 BRPM | OXYGEN SATURATION: 93 % | SYSTOLIC BLOOD PRESSURE: 138 MMHG | BODY MASS INDEX: 31.8 KG/M2 | DIASTOLIC BLOOD PRESSURE: 68 MMHG

## 2024-01-31 DIAGNOSIS — I10 ESSENTIAL HYPERTENSION: ICD-10-CM

## 2024-01-31 DIAGNOSIS — J43.9 PULMONARY EMPHYSEMA, UNSPECIFIED EMPHYSEMA TYPE (HCC): ICD-10-CM

## 2024-01-31 DIAGNOSIS — E89.0 POSTOPERATIVE HYPOTHYROIDISM: ICD-10-CM

## 2024-01-31 DIAGNOSIS — F33.1 MODERATE EPISODE OF RECURRENT MAJOR DEPRESSIVE DISORDER (HCC): ICD-10-CM

## 2024-01-31 DIAGNOSIS — E11.9 TYPE 2 DIABETES MELLITUS WITHOUT COMPLICATION, WITHOUT LONG-TERM CURRENT USE OF INSULIN (HCC): Primary | ICD-10-CM

## 2024-01-31 DIAGNOSIS — E78.2 MIXED HYPERLIPIDEMIA: ICD-10-CM

## 2024-01-31 DIAGNOSIS — D58.2 ELEVATED HEMOGLOBIN (HCC): ICD-10-CM

## 2024-01-31 PROBLEM — Z23 NEED FOR VACCINATION AGAINST STREPTOCOCCUS PNEUMONIAE: Status: RESOLVED | Noted: 2023-06-27 | Resolved: 2024-01-31

## 2024-01-31 PROBLEM — Z23 NEEDS FLU SHOT: Status: RESOLVED | Noted: 2022-09-29 | Resolved: 2024-01-31

## 2024-01-31 PROBLEM — F32.5 MAJOR DEPRESSIVE DISORDER WITH SINGLE EPISODE, IN FULL REMISSION (HCC): Status: RESOLVED | Noted: 2019-04-05 | Resolved: 2024-01-31

## 2024-01-31 LAB
CHP ED QC CHECK: NORMAL
CREATININE URINE POCT: 300
GLUCOSE BLD-MCNC: 94 MG/DL
HBA1C MFR BLD: 6.7 %
MICROALBUMIN/CREAT 24H UR: 30 MG/G{CREAT}
MICROALBUMIN/CREAT UR-RTO: <30

## 2024-01-31 PROCEDURE — 83036 HEMOGLOBIN GLYCOSYLATED A1C: CPT | Performed by: NURSE PRACTITIONER

## 2024-01-31 PROCEDURE — 82962 GLUCOSE BLOOD TEST: CPT | Performed by: NURSE PRACTITIONER

## 2024-01-31 PROCEDURE — 3017F COLORECTAL CA SCREEN DOC REV: CPT | Performed by: NURSE PRACTITIONER

## 2024-01-31 PROCEDURE — 99214 OFFICE O/P EST MOD 30 MIN: CPT | Performed by: NURSE PRACTITIONER

## 2024-01-31 PROCEDURE — 4004F PT TOBACCO SCREEN RCVD TLK: CPT | Performed by: NURSE PRACTITIONER

## 2024-01-31 PROCEDURE — G8427 DOCREV CUR MEDS BY ELIG CLIN: HCPCS | Performed by: NURSE PRACTITIONER

## 2024-01-31 PROCEDURE — 1090F PRES/ABSN URINE INCON ASSESS: CPT | Performed by: NURSE PRACTITIONER

## 2024-01-31 PROCEDURE — 3075F SYST BP GE 130 - 139MM HG: CPT | Performed by: NURSE PRACTITIONER

## 2024-01-31 PROCEDURE — 82044 UR ALBUMIN SEMIQUANTITATIVE: CPT | Performed by: NURSE PRACTITIONER

## 2024-01-31 PROCEDURE — 3023F SPIROM DOC REV: CPT | Performed by: NURSE PRACTITIONER

## 2024-01-31 PROCEDURE — 1123F ACP DISCUSS/DSCN MKR DOCD: CPT | Performed by: NURSE PRACTITIONER

## 2024-01-31 PROCEDURE — 2022F DILAT RTA XM EVC RTNOPTHY: CPT | Performed by: NURSE PRACTITIONER

## 2024-01-31 PROCEDURE — G8399 PT W/DXA RESULTS DOCUMENT: HCPCS | Performed by: NURSE PRACTITIONER

## 2024-01-31 PROCEDURE — G8484 FLU IMMUNIZE NO ADMIN: HCPCS | Performed by: NURSE PRACTITIONER

## 2024-01-31 PROCEDURE — 3044F HG A1C LEVEL LT 7.0%: CPT | Performed by: NURSE PRACTITIONER

## 2024-01-31 PROCEDURE — 3078F DIAST BP <80 MM HG: CPT | Performed by: NURSE PRACTITIONER

## 2024-01-31 PROCEDURE — G8417 CALC BMI ABV UP PARAM F/U: HCPCS | Performed by: NURSE PRACTITIONER

## 2024-01-31 RX ORDER — ROSUVASTATIN CALCIUM 40 MG/1
40 TABLET, COATED ORAL NIGHTLY
Qty: 90 TABLET | Refills: 1 | Status: SHIPPED | OUTPATIENT
Start: 2024-01-31

## 2024-01-31 RX ORDER — LEVOTHYROXINE SODIUM 0.07 MG/1
75 TABLET ORAL DAILY
Qty: 90 TABLET | Refills: 1 | Status: SHIPPED | OUTPATIENT
Start: 2024-01-31

## 2024-01-31 RX ORDER — HYDROCHLOROTHIAZIDE 25 MG/1
TABLET ORAL
Qty: 90 TABLET | Refills: 1 | Status: SHIPPED | OUTPATIENT
Start: 2024-01-31

## 2024-01-31 ASSESSMENT — ENCOUNTER SYMPTOMS
FACIAL SWELLING: 0
DIARRHEA: 0
NAUSEA: 0
SINUS PRESSURE: 0
COUGH: 0
SHORTNESS OF BREATH: 0
RHINORRHEA: 0
COLOR CHANGE: 0
SORE THROAT: 0
CONSTIPATION: 0
BACK PAIN: 0
VOICE CHANGE: 0
CHEST TIGHTNESS: 0
SINUS PAIN: 0
WHEEZING: 0
VOMITING: 0
ABDOMINAL PAIN: 0
TROUBLE SWALLOWING: 0

## 2024-01-31 ASSESSMENT — PATIENT HEALTH QUESTIONNAIRE - PHQ9
10. IF YOU CHECKED OFF ANY PROBLEMS, HOW DIFFICULT HAVE THESE PROBLEMS MADE IT FOR YOU TO DO YOUR WORK, TAKE CARE OF THINGS AT HOME, OR GET ALONG WITH OTHER PEOPLE: 0
9. THOUGHTS THAT YOU WOULD BE BETTER OFF DEAD, OR OF HURTING YOURSELF: 0
5. POOR APPETITE OR OVEREATING: 0
SUM OF ALL RESPONSES TO PHQ9 QUESTIONS 1 & 2: 0
3. TROUBLE FALLING OR STAYING ASLEEP: 0
8. MOVING OR SPEAKING SO SLOWLY THAT OTHER PEOPLE COULD HAVE NOTICED. OR THE OPPOSITE, BEING SO FIGETY OR RESTLESS THAT YOU HAVE BEEN MOVING AROUND A LOT MORE THAN USUAL: 0
1. LITTLE INTEREST OR PLEASURE IN DOING THINGS: 0
4. FEELING TIRED OR HAVING LITTLE ENERGY: 0
6. FEELING BAD ABOUT YOURSELF - OR THAT YOU ARE A FAILURE OR HAVE LET YOURSELF OR YOUR FAMILY DOWN: 0
SUM OF ALL RESPONSES TO PHQ QUESTIONS 1-9: 0
2. FEELING DOWN, DEPRESSED OR HOPELESS: 0
7. TROUBLE CONCENTRATING ON THINGS, SUCH AS READING THE NEWSPAPER OR WATCHING TELEVISION: 0

## 2024-01-31 NOTE — ASSESSMENT & PLAN NOTE
Monitored by specialist- no acute findings meriting change in the plan, work up was negative and follows with DR Burnett felt secondary to her smoking

## 2024-01-31 NOTE — ASSESSMENT & PLAN NOTE
well controlled, no significant side effects from medication noted. Continue levothyroxine 75 mcg daily, labs reviewed:CMP, CBCD, Lipids, TSH, FT4, in epic 11/2023.   Discussed taking same time every day on empty stomach, Wait 30 - 60 minutes to have breakfast or other medications.No PPI's or acid blockers, vitamins, minerals, calcium or iron for 4 hours after taking thyroid medication.

## 2024-01-31 NOTE — ASSESSMENT & PLAN NOTE
well controlled and no significant medication side effects noted, Continue Crestor 40 mg nightly,  labs reviewed Lipid, CMP, CBCD,   -Discussed low fat diet, limit fast food, goodies, breads and pastas if consuming several days a week,  limit alcohol consumption as this combined with statin can cause liver problems.  -Discussed weight reduction and exercise 30 minutes 5 days a week for total of 150 minutes weekly.  -Discussed CoQ10 as directed  -Discussed if any unusual muscle aching/pain to contact the office, discussed medication and risk of muscle pain/damage from Rhabdomyolysis.

## 2024-01-31 NOTE — PROGRESS NOTES
OFFICE PROGRESS NOTE  [facility]  [address]  [place] [address]  Dept: [phone]   Chief Complaint   Patient presents with    Diabetes    Hyperlipidemia    Hypothyroidism    Health Maintenance       ASSESSMENT/PLAN   1. Type 2 diabetes mellitus without complication, without long-term current use of insulin (HCC)  -     POCT glycosylated hemoglobin (Hb A1C)  -     POCT Glucose  -     HM DIABETES FOOT EXAM  -     POCT microalbumin  -     CBC with Auto Differential; Future  -     Comprehensive Metabolic Panel; Future  -     Lipid Panel; Future  2. Essential hypertension  Assessment & Plan:   well controlled, no significant side effects from medication noted. HCTz 25 mg dialy labs reviewed:CMP, CBCD, Lipids, TSH, FT4,   -Discussed taking medication as directed every day around the same time. Do not double up if skipped or missed doses.   -Discussed exercising daily 30 minutes 5 times a week for 150 minutes weekly to help with stress reduction and weight reduction if needed.  -Discussed low sodium diet.  -Discussed limiting caffeine consumption and tobacco cessation and the effects they have on the heart and blood pressure.   Orders:  -     hydroCHLOROthiazide (HYDRODIURIL) 25 MG tablet; TAKE 1 TABLET ONCE DAILY FOR BLOOD PRESSURE, Disp-90 tablet, R-1Normal  -     CBC with Auto Differential; Future  -     Comprehensive Metabolic Panel; Future  -     Lipid Panel; Future  3. Mixed hyperlipidemia  Assessment & Plan:    well controlled and no significant medication side effects noted, Continue Crestor 40 mg nightly,  labs reviewed Lipid, CMP, CBCD,   -Discussed low fat diet, limit fast food, goodies, breads and pastas if consuming several days a week,  limit alcohol consumption as this combined with statin can cause liver problems.  -Discussed weight reduction and exercise 30 minutes 5 days a week for total of 150 minutes weekly.  -Discussed CoQ10 as

## 2024-01-31 NOTE — ASSESSMENT & PLAN NOTE
Stable Stiolto 2 puffs daily rinse mouth well after each use. Has cut down on smoking to 1/2 pack  cigarettes a day

## 2024-02-28 DIAGNOSIS — I10 ESSENTIAL HYPERTENSION: ICD-10-CM

## 2024-02-28 DIAGNOSIS — K21.00 GASTROESOPHAGEAL REFLUX DISEASE WITH ESOPHAGITIS WITHOUT HEMORRHAGE: ICD-10-CM

## 2024-02-28 DIAGNOSIS — E78.2 MIXED HYPERLIPIDEMIA: ICD-10-CM

## 2024-02-28 DIAGNOSIS — E89.0 POSTOPERATIVE HYPOTHYROIDISM: ICD-10-CM

## 2024-02-28 DIAGNOSIS — E11.9 TYPE 2 DIABETES MELLITUS WITHOUT COMPLICATION, WITHOUT LONG-TERM CURRENT USE OF INSULIN (HCC): ICD-10-CM

## 2024-02-28 RX ORDER — LEVOTHYROXINE SODIUM 0.07 MG/1
75 TABLET ORAL DAILY
Qty: 90 TABLET | Refills: 1 | Status: SHIPPED | OUTPATIENT
Start: 2024-02-28

## 2024-02-28 RX ORDER — ROSUVASTATIN CALCIUM 40 MG/1
40 TABLET, COATED ORAL NIGHTLY
Qty: 90 TABLET | Refills: 1 | Status: SHIPPED | OUTPATIENT
Start: 2024-02-28

## 2024-02-28 RX ORDER — PANTOPRAZOLE SODIUM 40 MG/1
40 TABLET, DELAYED RELEASE ORAL DAILY
Qty: 90 TABLET | Refills: 3 | Status: SHIPPED | OUTPATIENT
Start: 2024-02-28

## 2024-02-28 RX ORDER — LIRAGLUTIDE 6 MG/ML
1.8 INJECTION SUBCUTANEOUS DAILY
Qty: 9 ML | Refills: 11 | Status: SHIPPED | OUTPATIENT
Start: 2024-02-28 | End: 2025-02-28

## 2024-02-28 RX ORDER — HYDROCHLOROTHIAZIDE 25 MG/1
TABLET ORAL
Qty: 90 TABLET | Refills: 1 | Status: SHIPPED | OUTPATIENT
Start: 2024-02-28

## 2024-02-28 NOTE — TELEPHONE ENCOUNTER
Patient called informing that she changed her mail order pharmacy to [facility] and is in need of the following refills.    Last seen 1/31/2024  Next appt 5/1/2024  [facility]

## 2024-03-01 ENCOUNTER — HOSPITAL ENCOUNTER (OUTPATIENT)
Dept: MAMMOGRAPHY | Age: 69
End: 2024-03-01
Payer: MEDICARE

## 2024-03-01 VITALS — HEIGHT: 63 IN | BODY MASS INDEX: 31.71 KG/M2 | WEIGHT: 179 LBS

## 2024-03-01 PROCEDURE — 77063 BREAST TOMOSYNTHESIS BI: CPT

## 2024-03-06 DIAGNOSIS — E11.9 TYPE 2 DIABETES MELLITUS WITHOUT COMPLICATION, WITHOUT LONG-TERM CURRENT USE OF INSULIN (HCC): ICD-10-CM

## 2024-03-06 RX ORDER — LIRAGLUTIDE 6 MG/ML
1.8 INJECTION SUBCUTANEOUS DAILY
Qty: 14 ML | Refills: 2 | Status: SHIPPED | OUTPATIENT
Start: 2024-03-06 | End: 2025-03-07

## 2024-03-07 DIAGNOSIS — E11.9 TYPE 2 DIABETES MELLITUS WITHOUT COMPLICATION, WITHOUT LONG-TERM CURRENT USE OF INSULIN (HCC): Primary | ICD-10-CM

## 2024-03-08 ENCOUNTER — TELEPHONE (OUTPATIENT)
Dept: FAMILY MEDICINE CLINIC | Age: 69
End: 2024-03-08

## 2024-03-08 NOTE — TELEPHONE ENCOUNTER
Spoke to the pharmacy and she stated the bydureon will be shipped.  It is covered by pt insurance.

## 2024-03-08 NOTE — TELEPHONE ENCOUNTER
Received call from Funmilayo @ [employer] in regards to Victoza not being covered under insurance.  I informed Funmilayo that we already received a call from [email or web address] today and a message was sent to PCP.      [employer] ph #[phone] option 2    Reference #[id number]

## 2024-03-08 NOTE — TELEPHONE ENCOUNTER
[facility] called to report that Victoza is not covered by insurance and they are requesting ozempic or mounjaro for alternative. Relayed message from Gia Hollins that she will not call those in and Bydureon was sent yesterday.     Pharm Tech ran bydureon and it was covered by insurance. No further action required.

## 2024-03-18 RX ORDER — DULOXETIN HYDROCHLORIDE 60 MG/1
60 CAPSULE, DELAYED RELEASE ORAL DAILY
Qty: 90 CAPSULE | Refills: 3 | OUTPATIENT
Start: 2024-03-18 | End: 2025-03-13

## 2024-03-18 RX ORDER — DICLOFENAC SODIUM AND MISOPROSTOL 75; 200 MG/1; UG/1
1 TABLET, DELAYED RELEASE ORAL 2 TIMES DAILY PRN
Qty: 60 TABLET | Refills: 2 | OUTPATIENT
Start: 2024-03-18 | End: 2024-06-16

## 2024-03-28 RX ORDER — DICLOFENAC SODIUM AND MISOPROSTOL 75; 200 MG/1; UG/1
1 TABLET, DELAYED RELEASE ORAL 2 TIMES DAILY PRN
Qty: 60 TABLET | Refills: 3 | Status: SHIPPED | OUTPATIENT
Start: 2024-03-28 | End: 2024-06-26

## 2024-03-28 RX ORDER — DULOXETIN HYDROCHLORIDE 60 MG/1
60 CAPSULE, DELAYED RELEASE ORAL DAILY
Qty: 90 CAPSULE | Refills: 3 | Status: SHIPPED | OUTPATIENT
Start: 2024-03-28 | End: 2025-03-23

## 2024-03-28 NOTE — TELEPHONE ENCOUNTER
Patient last visit 10-30-23 no return visit scheduled.  Patient sent [employer] refill request for arthrotec 75-0.2 mg 1 tab bid prn sent 10-25-23 #60 2 refills and Cymalta 60 mg 1 capsule qd sent 10-30-23 #90 3 refills.  She was to return in 1 year but the schedule is not up for 1 year so she is going to call please advise.

## 2024-04-03 DIAGNOSIS — E11.9 TYPE 2 DIABETES MELLITUS WITHOUT COMPLICATION, WITHOUT LONG-TERM CURRENT USE OF INSULIN (HCC): Primary | ICD-10-CM

## 2024-04-03 RX ORDER — SITAGLIPTIN 25 MG/1
25 TABLET, FILM COATED ORAL DAILY
Qty: 90 TABLET | Refills: 0 | Status: SHIPPED | OUTPATIENT
Start: 2024-04-03

## 2024-04-16 ENCOUNTER — TELEPHONE (OUTPATIENT)
Dept: FAMILY MEDICINE CLINIC | Age: 69
End: 2024-04-16

## 2024-04-16 NOTE — TELEPHONE ENCOUNTER
Sara/[employer] called to inform that she will be faxing over info for the Prior Auth for Januvia and wanted to make the ofc aware.  I provided the fax number, [phone] and ofc address.    Sara -  [phone] ([phone]    Last seen 1/31/2024  Next appt 5/1/2024

## 2024-04-18 DIAGNOSIS — E11.9 TYPE 2 DIABETES MELLITUS WITHOUT COMPLICATION, WITHOUT LONG-TERM CURRENT USE OF INSULIN (HCC): Primary | ICD-10-CM

## 2024-04-18 RX ORDER — ORAL SEMAGLUTIDE 3 MG/1
3 TABLET ORAL DAILY
Qty: 30 TABLET | Refills: 0 | Status: SHIPPED | OUTPATIENT
Start: 2024-04-18

## 2024-04-18 RX ORDER — ORAL SEMAGLUTIDE 7 MG/1
7 TABLET ORAL DAILY
Qty: 30 TABLET | Refills: 0 | Status: SHIPPED | OUTPATIENT
Start: 2024-04-18

## 2024-04-18 RX ORDER — ORAL SEMAGLUTIDE 14 MG/1
14 TABLET ORAL DAILY
Qty: 30 TABLET | Refills: 1 | Status: SHIPPED | OUTPATIENT
Start: 2024-04-18

## 2024-04-18 NOTE — TELEPHONE ENCOUNTER
RYBELSUS 3 MG TABLET - Tier 3   OZEMPIC (0.25 OR 0.5 MG/DOSE) 2 MG/1.5ML SOLN PEN INJ - Tier 3   OZEMPIC (0.25 OR 0.5 MG/DOSE) 2 MG/3ML SOLN PEN INJ - Tier 3   OZEMPIC (2 MG/DOSE) 8 MG/3ML SOLN PEN INJ - Tier 3   OZEMPIC (1 MG/DOSE) 4 MG/3ML SOLN PEN INJ - Tier 3   TRULICITY 0.75 MG/0.5ML SOLN PEN INJ - Tier 3   RYBELSUS 7 MG TABLET - Tier 3   BYDUREON BCISE 2 MG/0.85ML AUTO INJECTOR - Tier 3   RYBELSUS 14 MG TABLET - Tier 3   TRULICITY 1.5 MG/0.5ML SOLN PEN INJ - Tier 3   TRULICITY 3 MG/0.5ML SOLN PEN INJ - Tier 3   TRULICITY 4.5 MG/0.5ML SOLN PEN INJ - Tier 3    I attempted a prior auth on the Victoza and it was denied again.

## 2024-04-25 DIAGNOSIS — E11.9 TYPE 2 DIABETES MELLITUS WITHOUT COMPLICATION, WITHOUT LONG-TERM CURRENT USE OF INSULIN (HCC): Primary | ICD-10-CM

## 2024-04-25 RX ORDER — LIRAGLUTIDE 6 MG/ML
INJECTION SUBCUTANEOUS
Qty: 12 ADJUSTABLE DOSE PRE-FILLED PEN SYRINGE | Refills: 3 | Status: SHIPPED | OUTPATIENT
Start: 2024-04-25

## 2024-05-17 SDOH — HEALTH STABILITY: PHYSICAL HEALTH: ON AVERAGE, HOW MANY DAYS PER WEEK DO YOU ENGAGE IN MODERATE TO STRENUOUS EXERCISE (LIKE A BRISK WALK)?: 2 DAYS

## 2024-05-17 ASSESSMENT — PATIENT HEALTH QUESTIONNAIRE - PHQ9
6. FEELING BAD ABOUT YOURSELF - OR THAT YOU ARE A FAILURE OR HAVE LET YOURSELF OR YOUR FAMILY DOWN: NOT AT ALL
SUM OF ALL RESPONSES TO PHQ QUESTIONS 1-9: 0
7. TROUBLE CONCENTRATING ON THINGS, SUCH AS READING THE NEWSPAPER OR WATCHING TELEVISION: NOT AT ALL
8. MOVING OR SPEAKING SO SLOWLY THAT OTHER PEOPLE COULD HAVE NOTICED. OR THE OPPOSITE, BEING SO FIGETY OR RESTLESS THAT YOU HAVE BEEN MOVING AROUND A LOT MORE THAN USUAL: NOT AT ALL
SUM OF ALL RESPONSES TO PHQ QUESTIONS 1-9: 0
10. IF YOU CHECKED OFF ANY PROBLEMS, HOW DIFFICULT HAVE THESE PROBLEMS MADE IT FOR YOU TO DO YOUR WORK, TAKE CARE OF THINGS AT HOME, OR GET ALONG WITH OTHER PEOPLE: NOT DIFFICULT AT ALL
1. LITTLE INTEREST OR PLEASURE IN DOING THINGS: NOT AT ALL
4. FEELING TIRED OR HAVING LITTLE ENERGY: NOT AT ALL
3. TROUBLE FALLING OR STAYING ASLEEP: NOT AT ALL
SUM OF ALL RESPONSES TO PHQ9 QUESTIONS 1 & 2: 0
5. POOR APPETITE OR OVEREATING: NOT AT ALL
SUM OF ALL RESPONSES TO PHQ QUESTIONS 1-9: 0
SUM OF ALL RESPONSES TO PHQ QUESTIONS 1-9: 0
2. FEELING DOWN, DEPRESSED OR HOPELESS: NOT AT ALL
9. THOUGHTS THAT YOU WOULD BE BETTER OFF DEAD, OR OF HURTING YOURSELF: NOT AT ALL

## 2024-05-17 ASSESSMENT — LIFESTYLE VARIABLES
HOW MANY STANDARD DRINKS CONTAINING ALCOHOL DO YOU HAVE ON A TYPICAL DAY: 0
HOW MANY STANDARD DRINKS CONTAINING ALCOHOL DO YOU HAVE ON A TYPICAL DAY: PATIENT DOES NOT DRINK
HOW OFTEN DO YOU HAVE A DRINK CONTAINING ALCOHOL: NEVER
HOW OFTEN DO YOU HAVE A DRINK CONTAINING ALCOHOL: 1
HOW OFTEN DO YOU HAVE SIX OR MORE DRINKS ON ONE OCCASION: 1

## 2024-05-20 ENCOUNTER — OFFICE VISIT (OUTPATIENT)
Dept: FAMILY MEDICINE CLINIC | Age: 69
End: 2024-05-20
Payer: COMMERCIAL

## 2024-05-20 VITALS
SYSTOLIC BLOOD PRESSURE: 130 MMHG | DIASTOLIC BLOOD PRESSURE: 72 MMHG | RESPIRATION RATE: 16 BRPM | OXYGEN SATURATION: 91 % | HEART RATE: 89 BPM | BODY MASS INDEX: 32.07 KG/M2 | TEMPERATURE: 97.7 F | WEIGHT: 181 LBS | HEIGHT: 63 IN

## 2024-05-20 DIAGNOSIS — Z00.00 MEDICARE ANNUAL WELLNESS VISIT, SUBSEQUENT: Primary | ICD-10-CM

## 2024-05-20 DIAGNOSIS — L70.0 BLACKHEAD: ICD-10-CM

## 2024-05-20 DIAGNOSIS — Z87.891 PERSONAL HISTORY OF TOBACCO USE: ICD-10-CM

## 2024-05-20 PROCEDURE — G0296 VISIT TO DETERM LDCT ELIG: HCPCS | Performed by: NURSE PRACTITIONER

## 2024-05-20 PROCEDURE — 3075F SYST BP GE 130 - 139MM HG: CPT | Performed by: NURSE PRACTITIONER

## 2024-05-20 PROCEDURE — 1123F ACP DISCUSS/DSCN MKR DOCD: CPT | Performed by: NURSE PRACTITIONER

## 2024-05-20 PROCEDURE — 3078F DIAST BP <80 MM HG: CPT | Performed by: NURSE PRACTITIONER

## 2024-05-20 PROCEDURE — G0439 PPPS, SUBSEQ VISIT: HCPCS | Performed by: NURSE PRACTITIONER

## 2024-05-20 SDOH — ECONOMIC STABILITY: FOOD INSECURITY: WITHIN THE PAST 12 MONTHS, YOU WORRIED THAT YOUR FOOD WOULD RUN OUT BEFORE YOU GOT MONEY TO BUY MORE.: NEVER TRUE

## 2024-05-20 SDOH — ECONOMIC STABILITY: FOOD INSECURITY: WITHIN THE PAST 12 MONTHS, THE FOOD YOU BOUGHT JUST DIDN'T LAST AND YOU DIDN'T HAVE MONEY TO GET MORE.: NEVER TRUE

## 2024-05-20 SDOH — ECONOMIC STABILITY: INCOME INSECURITY: HOW HARD IS IT FOR YOU TO PAY FOR THE VERY BASICS LIKE FOOD, HOUSING, MEDICAL CARE, AND HEATING?: NOT HARD AT ALL

## 2024-05-20 NOTE — PROGRESS NOTES
Medicare Annual Wellness Visit    Selena Cary is here for Medicare AWV and Health Maintenance (Due for diabetic eye(she will call to schedule))    Assessment & Plan   Medicare annual wellness visit, subsequent  Personal history of tobacco use  -     ME VISIT TO DISCUSS LUNG CA SCREEN W LDCT  -     CT Lung Screen (Initial/Annual/Baseline); Future  Brad Villanueva MD, Plastic & Reconstructive Surgery, [place]    Recommendations for Preventive Services Due: see orders and patient instructions/AVS.  Recommended screening schedule for the next 5-10 years is provided to the patient in written form: see Patient Instructions/AVS.     Return for keep appt for July and then Medicare Annual Wellness Visit in 1 year thinks Dr Ortiz.     Subjective       Patient's complete Health Risk Assessment and screening values have been reviewed and are found in Flowsheets. The following problems were reviewed today and where indicated follow up appointments were made and/or referrals ordered.    Positive Risk Factor Screenings with Interventions:               General HRA Questions:  Select all that apply: (!) New or Increased Pain    Pain Interventions:  Patient comments: complains of left wrist pain thinks just since Friday when she was holding her great grand child a lot and walking him a lot so may be nothing.       Activity, Diet, and Weight:  On average, how many days per week do you engage in moderate to strenuous exercise (like a brisk walk)?: 2 days  On average, how many minutes do you engage in exercise at this level?: 30 min    Do you eat balanced/healthy meals regularly?: (!) No    Body mass index is 32.06 kg/m². (!) Abnormal    Do you eat balanced/healthy meals regularly Interventions:  Patient comments: I eat bagel and fruit with tea breakfast,lunch piece toast and egg and then a good dinner  1500 calorie/day diet, exercise for at least 150 minutes/week  Obesity Interventions:  Patient comments:

## 2024-06-11 DIAGNOSIS — E11.9 TYPE 2 DIABETES MELLITUS WITHOUT COMPLICATION, WITHOUT LONG-TERM CURRENT USE OF INSULIN (HCC): ICD-10-CM

## 2024-06-11 RX ORDER — SITAGLIPTIN 25 MG/1
25 TABLET, FILM COATED ORAL DAILY
Qty: 90 TABLET | Refills: 0 | OUTPATIENT
Start: 2024-06-11

## 2024-06-12 DIAGNOSIS — E11.9 TYPE 2 DIABETES MELLITUS WITHOUT COMPLICATION, WITHOUT LONG-TERM CURRENT USE OF INSULIN (HCC): Primary | ICD-10-CM

## 2024-06-12 RX ORDER — SITAGLIPTIN 25 MG/1
25 TABLET, FILM COATED ORAL DAILY
Qty: 90 TABLET | Refills: 1 | Status: SHIPPED | OUTPATIENT
Start: 2024-06-12

## 2024-06-17 DIAGNOSIS — E11.9 TYPE 2 DIABETES MELLITUS WITHOUT COMPLICATION, WITHOUT LONG-TERM CURRENT USE OF INSULIN (HCC): ICD-10-CM

## 2024-06-17 RX ORDER — LIRAGLUTIDE 6 MG/ML
INJECTION SUBCUTANEOUS
Refills: 3 | OUTPATIENT
Start: 2024-06-17

## 2024-06-27 ENCOUNTER — OFFICE VISIT (OUTPATIENT)
Dept: SURGERY | Age: 69
End: 2024-06-27
Payer: COMMERCIAL

## 2024-06-27 VITALS
BODY MASS INDEX: 32.6 KG/M2 | HEART RATE: 77 BPM | SYSTOLIC BLOOD PRESSURE: 155 MMHG | HEIGHT: 63 IN | WEIGHT: 184 LBS | DIASTOLIC BLOOD PRESSURE: 82 MMHG

## 2024-06-27 DIAGNOSIS — R22.9 MASS OF SKIN: Primary | ICD-10-CM

## 2024-06-27 PROCEDURE — 1123F ACP DISCUSS/DSCN MKR DOCD: CPT | Performed by: PLASTIC SURGERY

## 2024-06-27 PROCEDURE — 3077F SYST BP >= 140 MM HG: CPT | Performed by: PLASTIC SURGERY

## 2024-06-27 PROCEDURE — 99203 OFFICE O/P NEW LOW 30 MIN: CPT | Performed by: PLASTIC SURGERY

## 2024-06-27 PROCEDURE — 3079F DIAST BP 80-89 MM HG: CPT | Performed by: PLASTIC SURGERY

## 2024-06-27 NOTE — PROGRESS NOTES
further procedures. All of Her questions were answered to their satisfaction and She agrees to proceed with the procedure.    -The patient will have the mass excisionally biopsied with local anesthesia.   -The patient will not require pre-op clearance from their PCP.    Surgical Plan: Excisional biopsy of right axilla subcutaneous mass    Photos obtained    Follow up day of procedure.       This document is generated, in part, by voice recognition software and thus  syntax and grammatical errors are possible.    Brad Fuentes MD  12:04 PM  7/17/2024

## 2024-07-01 NOTE — PROGRESS NOTES
Subjective:    Follow up today for excisional biopsy of right axillary subcutaneous mass. Denies fever, nausea, vomiting, leg pain or swelling.  The patient voices understanding of the procedure they are having today and would like to proceed. Patient states that the mass has been painful and growing.    Objective:    LMP 03/01[birth year]1978 (Approximate)       Right axilla subcutaneous mass   Lesion- 10mm x 6mm  Margin- 0mm  Defect- 10mm x 5mm  Scar-10mm         Assessment:    Patient Active Problem List   Diagnosis    Type 2 diabetes mellitus without complication, without long-term current use of insulin (HCC)    Vitamin D deficiency    Abnormal EKG    Nonrheumatic aortic valve stenosis    Mixed hyperlipidemia    Severe obesity (BMI 35.0-39.9) with comorbidity (HCC)    Leg length difference, acquired    Chronic midline low back pain with left-sided sciatica    Postoperative hypothyroidism    Gastroesophageal reflux disease without esophagitis    FHx: breast cancer in first degree relative    Family history of BRCA gene positive    Essential hypertension    Screening mammogram for high-risk patient    Age-related osteoporosis without current pathological fracture     Personal history of tobacco use, presenting hazards to health    Chronic right shoulder pain    Chronic hip pain, bilateral    Elevated hemoglobin (HCC)    At high risk for falls    Wrist pain, acute, left    De Quervain's tenosynovitis, left    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

## 2024-07-10 ENCOUNTER — PROCEDURE VISIT (OUTPATIENT)
Dept: SURGERY | Age: 69
End: 2024-07-10

## 2024-07-10 VITALS — TEMPERATURE: 97.9 F

## 2024-07-10 DIAGNOSIS — R22.9 MASS OF SKIN: Primary | ICD-10-CM

## 2024-07-10 RX ORDER — LIDOCAINE HYDROCHLORIDE AND EPINEPHRINE 10; 10 MG/ML; UG/ML
10 INJECTION, SOLUTION INFILTRATION; PERINEURAL ONCE
Status: COMPLETED | OUTPATIENT
Start: 2024-07-10 | End: 2024-07-10

## 2024-07-10 RX ADMIN — LIDOCAINE HYDROCHLORIDE AND EPINEPHRINE 10 ML: 10; 10 INJECTION, SOLUTION INFILTRATION; PERINEURAL at 16:22

## 2024-07-19 DIAGNOSIS — E89.0 POSTOPERATIVE HYPOTHYROIDISM: ICD-10-CM

## 2024-07-19 DIAGNOSIS — E78.2 MIXED HYPERLIPIDEMIA: ICD-10-CM

## 2024-07-19 DIAGNOSIS — E11.9 TYPE 2 DIABETES MELLITUS WITHOUT COMPLICATION, WITHOUT LONG-TERM CURRENT USE OF INSULIN (HCC): ICD-10-CM

## 2024-07-19 DIAGNOSIS — I10 ESSENTIAL HYPERTENSION: ICD-10-CM

## 2024-07-19 LAB
ALBUMIN: 3.8 G/DL (ref 3.5–5.2)
ALP BLD-CCNC: 65 U/L (ref 35–104)
ALT SERPL-CCNC: 10 U/L (ref 0–32)
ANION GAP SERPL CALCULATED.3IONS-SCNC: 10 MMOL/L (ref 7–16)
AST SERPL-CCNC: 17 U/L (ref 0–31)
BASOPHILS ABSOLUTE: 0.1 K/UL (ref 0–0.2)
BASOPHILS RELATIVE PERCENT: 1 % (ref 0–2)
BILIRUB SERPL-MCNC: 0.6 MG/DL (ref 0–1.2)
BUN BLDV-MCNC: 12 MG/DL (ref 6–23)
CALCIUM SERPL-MCNC: 8.6 MG/DL (ref 8.6–10.2)
CHLORIDE BLD-SCNC: 95 MMOL/L (ref 98–107)
CHOLESTEROL, TOTAL: 126 MG/DL
CO2: 28 MMOL/L (ref 22–29)
CREAT SERPL-MCNC: 0.9 MG/DL (ref 0.5–1)
EOSINOPHILS ABSOLUTE: 0.18 K/UL (ref 0.05–0.5)
EOSINOPHILS RELATIVE PERCENT: 1 % (ref 0–6)
GFR, ESTIMATED: 73 ML/MIN/1.73M2
GLUCOSE BLD-MCNC: 117 MG/DL (ref 74–99)
HCT VFR BLD CALC: 48.5 % (ref 34–48)
HDLC SERPL-MCNC: 48 MG/DL
HEMOGLOBIN: 15.4 G/DL (ref 11.5–15.5)
IMMATURE GRANULOCYTES %: 1 % (ref 0–5)
IMMATURE GRANULOCYTES ABSOLUTE: 0.07 K/UL (ref 0–0.58)
LDL CHOLESTEROL: 56 MG/DL
LYMPHOCYTES ABSOLUTE: 1.45 K/UL (ref 1.5–4)
LYMPHOCYTES RELATIVE PERCENT: 11 % (ref 20–42)
MCH RBC QN AUTO: 28.6 PG (ref 26–35)
MCHC RBC AUTO-ENTMCNC: 31.8 G/DL (ref 32–34.5)
MCV RBC AUTO: 90.1 FL (ref 80–99.9)
MONOCYTES ABSOLUTE: 0.84 K/UL (ref 0.1–0.95)
MONOCYTES RELATIVE PERCENT: 6 % (ref 2–12)
NEUTROPHILS ABSOLUTE: 10.6 K/UL (ref 1.8–7.3)
NEUTROPHILS RELATIVE PERCENT: 80 % (ref 43–80)
PDW BLD-RTO: 15.3 % (ref 11.5–15)
PLATELET # BLD: 299 K/UL (ref 130–450)
PMV BLD AUTO: 9.9 FL (ref 7–12)
POTASSIUM SERPL-SCNC: 4.4 MMOL/L (ref 3.5–5)
RBC # BLD: 5.38 M/UL (ref 3.5–5.5)
SODIUM BLD-SCNC: 133 MMOL/L (ref 132–146)
T4 FREE: 1.4 NG/DL (ref 0.9–1.7)
TOTAL PROTEIN: 6.6 G/DL (ref 6.4–8.3)
TRIGL SERPL-MCNC: 112 MG/DL
TSH SERPL DL<=0.05 MIU/L-ACNC: 3.84 UIU/ML (ref 0.27–4.2)
VLDLC SERPL CALC-MCNC: 22 MG/DL
WBC # BLD: 13.2 K/UL (ref 4.5–11.5)

## 2024-07-22 ENCOUNTER — OFFICE VISIT (OUTPATIENT)
Dept: FAMILY MEDICINE CLINIC | Age: 69
End: 2024-07-22
Payer: COMMERCIAL

## 2024-07-22 VITALS
BODY MASS INDEX: 32.43 KG/M2 | WEIGHT: 183 LBS | TEMPERATURE: 97.1 F | RESPIRATION RATE: 16 BRPM | HEART RATE: 72 BPM | SYSTOLIC BLOOD PRESSURE: 120 MMHG | OXYGEN SATURATION: 96 % | HEIGHT: 63 IN | DIASTOLIC BLOOD PRESSURE: 62 MMHG

## 2024-07-22 DIAGNOSIS — E78.2 MIXED HYPERLIPIDEMIA: ICD-10-CM

## 2024-07-22 DIAGNOSIS — E89.0 POSTOPERATIVE HYPOTHYROIDISM: ICD-10-CM

## 2024-07-22 DIAGNOSIS — I10 ESSENTIAL HYPERTENSION: ICD-10-CM

## 2024-07-22 DIAGNOSIS — E11.9 TYPE 2 DIABETES MELLITUS WITHOUT COMPLICATION, WITHOUT LONG-TERM CURRENT USE OF INSULIN (HCC): Primary | ICD-10-CM

## 2024-07-22 DIAGNOSIS — K21.00 GASTROESOPHAGEAL REFLUX DISEASE WITH ESOPHAGITIS WITHOUT HEMORRHAGE: ICD-10-CM

## 2024-07-22 DIAGNOSIS — M81.0 AGE-RELATED OSTEOPOROSIS WITHOUT CURRENT PATHOLOGICAL FRACTURE: ICD-10-CM

## 2024-07-22 DIAGNOSIS — J43.9 PULMONARY EMPHYSEMA, UNSPECIFIED EMPHYSEMA TYPE (HCC): ICD-10-CM

## 2024-07-22 DIAGNOSIS — F33.1 MODERATE EPISODE OF RECURRENT MAJOR DEPRESSIVE DISORDER (HCC): ICD-10-CM

## 2024-07-22 LAB
CHP ED QC CHECK: ABNORMAL
GLUCOSE BLD-MCNC: 136 MG/DL
HBA1C MFR BLD: 6.8 %

## 2024-07-22 PROCEDURE — 3044F HG A1C LEVEL LT 7.0%: CPT | Performed by: NURSE PRACTITIONER

## 2024-07-22 PROCEDURE — 3078F DIAST BP <80 MM HG: CPT | Performed by: NURSE PRACTITIONER

## 2024-07-22 PROCEDURE — 83036 HEMOGLOBIN GLYCOSYLATED A1C: CPT | Performed by: NURSE PRACTITIONER

## 2024-07-22 PROCEDURE — 3074F SYST BP LT 130 MM HG: CPT | Performed by: NURSE PRACTITIONER

## 2024-07-22 PROCEDURE — 82962 GLUCOSE BLOOD TEST: CPT | Performed by: NURSE PRACTITIONER

## 2024-07-22 PROCEDURE — 1123F ACP DISCUSS/DSCN MKR DOCD: CPT | Performed by: NURSE PRACTITIONER

## 2024-07-22 PROCEDURE — 99214 OFFICE O/P EST MOD 30 MIN: CPT | Performed by: NURSE PRACTITIONER

## 2024-07-22 RX ORDER — PANTOPRAZOLE SODIUM 40 MG/1
40 TABLET, DELAYED RELEASE ORAL DAILY
Qty: 90 TABLET | Refills: 3 | Status: CANCELLED | OUTPATIENT
Start: 2024-07-22

## 2024-07-22 RX ORDER — ROSUVASTATIN CALCIUM 40 MG/1
40 TABLET, COATED ORAL NIGHTLY
Qty: 90 TABLET | Refills: 1 | Status: CANCELLED | OUTPATIENT
Start: 2024-07-22

## 2024-07-22 RX ORDER — ALENDRONATE SODIUM 70 MG/1
70 TABLET ORAL
Qty: 12 TABLET | Refills: 3 | Status: CANCELLED | OUTPATIENT
Start: 2024-07-22

## 2024-07-22 RX ORDER — PEN NEEDLE, DIABETIC 32GX 5/32"
1 NEEDLE, DISPOSABLE MISCELLANEOUS DAILY
Qty: 100 EACH | Refills: 4 | Status: CANCELLED | OUTPATIENT
Start: 2024-07-22

## 2024-07-22 RX ORDER — LEVOTHYROXINE SODIUM 0.07 MG/1
75 TABLET ORAL DAILY
Qty: 90 TABLET | Refills: 1 | Status: CANCELLED | OUTPATIENT
Start: 2024-07-22

## 2024-07-22 RX ORDER — HYDROCHLOROTHIAZIDE 25 MG/1
TABLET ORAL
Qty: 90 TABLET | Refills: 1 | Status: CANCELLED | OUTPATIENT
Start: 2024-07-22

## 2024-07-22 RX ORDER — BLOOD SUGAR DIAGNOSTIC
1 STRIP MISCELLANEOUS DAILY
Qty: 100 EACH | Refills: 3 | Status: CANCELLED | OUTPATIENT
Start: 2024-07-22

## 2024-07-22 RX ORDER — SITAGLIPTIN 25 MG/1
25 TABLET, FILM COATED ORAL DAILY
Qty: 90 TABLET | Refills: 1 | Status: CANCELLED | OUTPATIENT
Start: 2024-07-22

## 2024-07-22 RX ORDER — LIRAGLUTIDE 6 MG/ML
INJECTION SUBCUTANEOUS
Qty: 12 ADJUSTABLE DOSE PRE-FILLED PEN SYRINGE | Refills: 3 | Status: CANCELLED | OUTPATIENT
Start: 2024-07-22

## 2024-07-22 RX ORDER — GLUCOSAM/CHON-MSM1/C/MANG/BOSW 500-416.6
1 TABLET ORAL DAILY
Qty: 100 EACH | Refills: 3 | Status: CANCELLED | OUTPATIENT
Start: 2024-07-22

## 2024-07-22 ASSESSMENT — ENCOUNTER SYMPTOMS
CONSTIPATION: 0
VOMITING: 0
COLOR CHANGE: 0
BACK PAIN: 0
TROUBLE SWALLOWING: 0
NAUSEA: 0
SINUS PAIN: 0
SINUS PRESSURE: 0
FACIAL SWELLING: 0
WHEEZING: 0
CHEST TIGHTNESS: 0
SHORTNESS OF BREATH: 0
RHINORRHEA: 0
VOICE CHANGE: 0
SORE THROAT: 0
COUGH: 1
DIARRHEA: 0
ABDOMINAL PAIN: 0

## 2024-07-22 NOTE — PROGRESS NOTES
OFFICE PROGRESS NOTE  [facility]  [facility]  [address]  [place] [address]  Dept: [phone]   Chief Complaint   Patient presents with    6 Month Follow-Up    Discuss Labs    Diabetes    Hypertension    Hyperlipidemia       ASSESSMENT/PLAN   1. Type 2 diabetes mellitus without complication, without long-term current use of insulin ([facility])  Assessment & Plan:   reasonably well controlled and no significant medication side effects noted A1c 6.8  %, eGFR >60  continue victoza 1.8 mg daily, januvia 25 mg daily.  fasting labs ordered,  reviewed CMP, CBCD H/H worsening secondary to smoking, lipids stable  BS log reviewed with patient   Monitor BS at different times: 1 day fasting, then next day 2 hours after lunch, next day 2 hours after dinner, next day at bedtime then start over and log all values.  Bring log to next appointment  Discussed hypoglycemia prevention and treatment, take medication regularly don't skip doses or double up.   Foot exam every day; wash and dry well between toes, look for any redness, cracks, wounds notify provider if any problems occur. Be sure to wear hard sole shoe at all times and not go barefooted. Wear proper fitting shoes to prevent ulcer, callous formation.   Reminder for Podiatry visits Q 2 Months for toenail care if needed  Reminder for annual Eye exam  Reminder to keep vaccines up dated  Exercise 30 minutes daily for 150 minutes a week  Recommend Diabetic Education Classes if you have not already attended  Stay up to date on all immunizations.  Orders:  -     POCT glycosylated hemoglobin (Hb A1C)  -     POCT Glucose  2. Essential hypertension  Assessment & Plan:   well controlled, no significant side effects from medication noted. HCTz 25 mg dialy labs reviewed:CMP, CBCD, Lipids, TSH, FT4, in epic 719/24  -Discussed taking medication as directed every day around the same time. Do not double up if skipped or missed doses.   -Discussed

## 2024-07-22 NOTE — ASSESSMENT & PLAN NOTE
Well controlled try to decrease the dose as tolerated.     -Discussed elevated the HOB 30 degrees  -Discussed limiting spicy, fatty, greasy foods  -Discussed limit caffeine consumption to 1 - 2 cups daily  -Discussed waiting at least 3 - 4 hours before going to bed after eating  -Discussed not to eat and bend over immediately or lift heavy items.  -Discussed weight reduction if needed even 5 - 10 pounds.  -Discussed taking medications 1 hour prior to eating.   -Stay well hydrated

## 2024-07-22 NOTE — ASSESSMENT & PLAN NOTE
well controlled, no significant side effects from medication noted. HCTz 25 mg dialy labs reviewed:CMP, CBCD, Lipids, TSH, FT4, in epic 719/24  -Discussed taking medication as directed every day around the same time. Do not double up if skipped or missed doses.   -Discussed exercising daily 30 minutes 5 times a week for 150 minutes weekly to help with stress reduction and weight reduction if needed.  -Discussed low sodium diet.  -Discussed limiting caffeine consumption and tobacco cessation and the effects they have on the heart and blood pressure.

## 2024-07-22 NOTE — ASSESSMENT & PLAN NOTE
well controlled and no significant medication side effects noted, Continue Crestor 40 mg nightly,  labs reviewed Lipid, CMP, CBCD, 7/19/24  -Discussed low fat diet, limit fast food, goodies, breads and pastas if consuming several days a week,  limit alcohol consumption as this combined with statin can cause liver problems.  -Discussed weight reduction and exercise 30 minutes 5 days a week for total of 150 minutes weekly.  -Discussed CoQ10 as directed  -Discussed if any unusual muscle aching/pain to contact the office, discussed medication and risk of muscle pain/damage from Rhabdomyolysis

## 2024-07-22 NOTE — ASSESSMENT & PLAN NOTE
reasonably well controlled and no significant medication side effects noted A1c 6.8  %, eGFR >60  continue victoza 1.8 mg daily, januvia 25 mg daily.  fasting labs ordered,  reviewed CMP, CBCD H/H worsening secondary to smoking, lipids stable  BS log reviewed with patient   Monitor BS at different times: 1 day fasting, then next day 2 hours after lunch, next day 2 hours after dinner, next day at bedtime then start over and log all values.  Bring log to next appointment  Discussed hypoglycemia prevention and treatment, take medication regularly don't skip doses or double up.   Foot exam every day; wash and dry well between toes, look for any redness, cracks, wounds notify provider if any problems occur. Be sure to wear hard sole shoe at all times and not go barefooted. Wear proper fitting shoes to prevent ulcer, callous formation.   Reminder for Podiatry visits Q 2 Months for toenail care if needed  Reminder for annual Eye exam  Reminder to keep vaccines up dated  Exercise 30 minutes daily for 150 minutes a week  Recommend Diabetic Education Classes if you have not already attended  Stay up to date on all immunizations.

## 2024-07-22 NOTE — ASSESSMENT & PLAN NOTE
well controlled, no significant side effects from medication noted. Continue levothyroxine 75 mcg daily, labs reviewed:CMP, CBCD, Lipids, TSH, FT4, in epic 7/19/24.   Discussed taking same time every day on empty stomach, Wait 30 - 60 minutes to have breakfast or other medications.No PPI's or acid blockers, vitamins, minerals, calcium or iron for 4 hours after taking thyroid medication.

## 2024-08-01 DIAGNOSIS — K21.00 GASTROESOPHAGEAL REFLUX DISEASE WITH ESOPHAGITIS WITHOUT HEMORRHAGE: ICD-10-CM

## 2024-08-01 DIAGNOSIS — E89.0 POSTOPERATIVE HYPOTHYROIDISM: ICD-10-CM

## 2024-08-01 DIAGNOSIS — E78.2 MIXED HYPERLIPIDEMIA: ICD-10-CM

## 2024-08-01 DIAGNOSIS — I10 ESSENTIAL HYPERTENSION: ICD-10-CM

## 2024-08-01 DIAGNOSIS — M81.0 AGE-RELATED OSTEOPOROSIS WITHOUT CURRENT PATHOLOGICAL FRACTURE: ICD-10-CM

## 2024-08-01 DIAGNOSIS — E11.9 TYPE 2 DIABETES MELLITUS WITHOUT COMPLICATION, WITHOUT LONG-TERM CURRENT USE OF INSULIN (HCC): ICD-10-CM

## 2024-08-01 DIAGNOSIS — J43.9 PULMONARY EMPHYSEMA, UNSPECIFIED EMPHYSEMA TYPE (HCC): ICD-10-CM

## 2024-08-01 RX ORDER — ROSUVASTATIN CALCIUM 40 MG/1
40 TABLET, COATED ORAL NIGHTLY
Qty: 90 TABLET | Refills: 1 | Status: SHIPPED | OUTPATIENT
Start: 2024-08-01

## 2024-08-01 RX ORDER — ALENDRONATE SODIUM 70 MG/1
70 TABLET ORAL
Qty: 12 TABLET | Refills: 1 | Status: SHIPPED | OUTPATIENT
Start: 2024-08-01

## 2024-08-01 RX ORDER — HYDROCHLOROTHIAZIDE 25 MG/1
TABLET ORAL
Qty: 90 TABLET | Refills: 1 | Status: SHIPPED | OUTPATIENT
Start: 2024-08-01

## 2024-08-01 RX ORDER — PANTOPRAZOLE SODIUM 40 MG/1
40 TABLET, DELAYED RELEASE ORAL DAILY
Qty: 90 TABLET | Refills: 3 | Status: SHIPPED | OUTPATIENT
Start: 2024-08-01

## 2024-08-01 RX ORDER — LEVOTHYROXINE SODIUM 0.07 MG/1
75 TABLET ORAL DAILY
Qty: 90 TABLET | Refills: 1 | Status: SHIPPED | OUTPATIENT
Start: 2024-08-01

## 2024-08-01 RX ORDER — LIRAGLUTIDE 6 MG/ML
INJECTION SUBCUTANEOUS
Qty: 12 ADJUSTABLE DOSE PRE-FILLED PEN SYRINGE | Refills: 3 | Status: SHIPPED | OUTPATIENT
Start: 2024-08-01

## 2024-08-01 RX ORDER — SITAGLIPTIN 25 MG/1
25 TABLET, FILM COATED ORAL DAILY
Qty: 90 TABLET | Refills: 1 | Status: SHIPPED | OUTPATIENT
Start: 2024-08-01

## 2024-08-08 DIAGNOSIS — I10 ESSENTIAL HYPERTENSION: ICD-10-CM

## 2024-08-08 DIAGNOSIS — E11.9 TYPE 2 DIABETES MELLITUS WITHOUT COMPLICATION, WITHOUT LONG-TERM CURRENT USE OF INSULIN (HCC): ICD-10-CM

## 2024-08-08 DIAGNOSIS — E89.0 POSTOPERATIVE HYPOTHYROIDISM: ICD-10-CM

## 2024-08-08 DIAGNOSIS — E78.2 MIXED HYPERLIPIDEMIA: ICD-10-CM

## 2024-08-08 DIAGNOSIS — K21.00 GASTROESOPHAGEAL REFLUX DISEASE WITH ESOPHAGITIS WITHOUT HEMORRHAGE: ICD-10-CM

## 2024-08-08 RX ORDER — LEVOTHYROXINE SODIUM 0.07 MG/1
75 TABLET ORAL DAILY
Qty: 90 TABLET | Refills: 1 | OUTPATIENT
Start: 2024-08-08

## 2024-08-08 RX ORDER — DULOXETIN HYDROCHLORIDE 60 MG/1
60 CAPSULE, DELAYED RELEASE ORAL DAILY
Qty: 90 CAPSULE | Refills: 3 | OUTPATIENT
Start: 2024-08-08 | End: 2025-08-03

## 2024-08-08 RX ORDER — HYDROCHLOROTHIAZIDE 25 MG/1
TABLET ORAL
Qty: 90 TABLET | Refills: 1 | OUTPATIENT
Start: 2024-08-08

## 2024-08-08 RX ORDER — NORTRIPTYLINE HCL 10 MG
10 CAPSULE ORAL NIGHTLY
Qty: 90 CAPSULE | Refills: 3 | OUTPATIENT
Start: 2024-08-08 | End: 2025-08-03

## 2024-08-08 RX ORDER — ROSUVASTATIN CALCIUM 40 MG/1
40 TABLET, COATED ORAL NIGHTLY
Qty: 90 TABLET | Refills: 1 | OUTPATIENT
Start: 2024-08-08

## 2024-08-08 RX ORDER — LIRAGLUTIDE 6 MG/ML
INJECTION SUBCUTANEOUS
Qty: 12 ADJUSTABLE DOSE PRE-FILLED PEN SYRINGE | Refills: 3 | OUTPATIENT
Start: 2024-08-08

## 2024-08-08 RX ORDER — PANTOPRAZOLE SODIUM 40 MG/1
40 TABLET, DELAYED RELEASE ORAL DAILY
Qty: 90 TABLET | Refills: 3 | OUTPATIENT
Start: 2024-08-08

## 2024-08-08 RX ORDER — DICLOFENAC SODIUM AND MISOPROSTOL 75; 200 MG/1; UG/1
1 TABLET, DELAYED RELEASE ORAL 2 TIMES DAILY PRN
Qty: 60 TABLET | Refills: 3 | OUTPATIENT
Start: 2024-08-08 | End: 2024-11-06

## 2024-08-08 RX ORDER — SITAGLIPTIN 25 MG/1
25 TABLET, FILM COATED ORAL DAILY
Qty: 90 TABLET | Refills: 1 | OUTPATIENT
Start: 2024-08-08

## 2024-08-08 NOTE — TELEPHONE ENCOUNTER
Last seen 7/22/2024  Next appt 11/25/2024    Requested Prescriptions     Pending Prescriptions Disp Refills    hydroCHLOROthiazide (HYDRODIURIL) 25 MG tablet 90 tablet 1     Sig: TAKE 1 TABLET ONCE DAILY FOR BLOOD PRESSURE    levothyroxine (SYNTHROID) 75 MCG tablet 90 tablet 1     Sig: Take 1 tablet by mouth daily 1 hour before breakfast For thyroid    JANUVIA 25 MG tablet 90 tablet 1     Sig: Take 1 tablet by mouth daily    Liraglutide (VICTOZA) 18 MG/3ML SOPN SC injection 12 Adjustable Dose Pre-filled Pen Syringe 3     Sig: Start 0.6 mg daily x 1 week then increase 1.2 mg daily x 1 week then 1.8 mg daily thereafter    pantoprazole (PROTONIX) 40 MG tablet 90 tablet 3     Sig: Take 1 tablet by mouth daily    rosuvastatin (CRESTOR) 40 MG tablet 90 tablet 1     Sig: Take 1 tablet by mouth at bedtime For cholesterol,

## 2024-08-19 ENCOUNTER — OFFICE VISIT (OUTPATIENT)
Dept: SURGERY | Age: 69
End: 2024-08-19

## 2024-08-19 VITALS — TEMPERATURE: 97.3 F

## 2024-08-19 DIAGNOSIS — R22.9 MASS OF SKIN: Primary | ICD-10-CM

## 2024-08-19 PROCEDURE — 99024 POSTOP FOLLOW-UP VISIT: CPT | Performed by: PHYSICIAN ASSISTANT

## 2024-08-19 NOTE — PROGRESS NOTES
Subjective:    Follow up today from excisional biopsy subcutaneous mass of right axilla. Denies fever, nausea, vomiting, leg pain or swelling, pain is absent.  The pt states that they have  kept the wound site(s) covered as directed.  The patient states that they have  finished their antibiotic.  They state that their pain is absent.    Objective:    Temp 97.3 °F (36.3 °C) (Infrared)   LMP 03/01/1978 (Approximate)       subcutaneous mass of right axillaWound: Clean, dry, and intact, no drainage.  No signs of infection, wound healing well    Neuro- Sensation  intact to julisa incisional site with light touch .     Assessment:    Patient Active Problem List   Diagnosis    Type 2 diabetes mellitus without complication, without long-term current use of insulin (HCC)    Vitamin D deficiency    Abnormal EKG    Nonrheumatic aortic valve stenosis    Mixed hyperlipidemia    Severe obesity (BMI 35.0-39.9) with comorbidity (HCC)    Leg length difference, acquired    Chronic midline low back pain with left-sided sciatica    Postoperative hypothyroidism    Gastroesophageal reflux disease without esophagitis    FHx: breast cancer in first degree relative    Family history of BRCA gene positive    Essential hypertension    Screening mammogram for high-risk patient    Age-related osteoporosis without current pathological fracture     Personal history of tobacco use, presenting hazards to health    Chronic right shoulder pain    Chronic hip pain, bilateral    Elevated hemoglobin (HCC)    At high risk for falls    Wrist pain, acute, left    De Quervain's tenosynovitis, left    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

## 2024-08-20 RX ORDER — AZELASTINE 1 MG/ML
1-2 SPRAY, METERED NASAL 2 TIMES DAILY PRN
Qty: 30 ML | Refills: 1 | OUTPATIENT
Start: 2024-08-20

## 2024-08-22 LAB — SURGICAL PATHOLOGY REPORT: NORMAL

## 2024-09-10 RX ORDER — DICLOFENAC SODIUM AND MISOPROSTOL 75; 200 MG/1; UG/1
1 TABLET, DELAYED RELEASE ORAL 2 TIMES DAILY PRN
Qty: 60 TABLET | Refills: 3 | OUTPATIENT
Start: 2024-09-10

## 2024-09-20 RX ORDER — DICLOFENAC SODIUM AND MISOPROSTOL 75; 200 MG/1; UG/1
1 TABLET, DELAYED RELEASE ORAL 2 TIMES DAILY PRN
Qty: 60 TABLET | Refills: 3 | OUTPATIENT
Start: 2024-09-20 | End: 2024-12-19

## 2024-10-14 ENCOUNTER — TELEPHONE (OUTPATIENT)
Dept: FAMILY MEDICINE CLINIC | Age: 69
End: 2024-10-14

## 2024-10-14 NOTE — TELEPHONE ENCOUNTER
Left voicemail explaining pt I contacted Dr. Farley office and COREY Coleman would like for pt to call office (option 3) for prescription refill.

## 2024-10-15 DIAGNOSIS — M54.42 CHRONIC MIDLINE LOW BACK PAIN WITH LEFT-SIDED SCIATICA: Primary | ICD-10-CM

## 2024-10-15 DIAGNOSIS — G89.29 CHRONIC MIDLINE LOW BACK PAIN WITH LEFT-SIDED SCIATICA: Primary | ICD-10-CM

## 2024-10-23 LAB — DIABETIC RETINOPATHY: NEGATIVE

## 2024-11-01 PROBLEM — Z12.31 ENCOUNTER FOR SCREENING MAMMOGRAM FOR HIGH-RISK PATIENT: Status: RESOLVED | Noted: 2020-02-19 | Resolved: 2024-11-01

## 2024-11-04 ENCOUNTER — HOSPITAL ENCOUNTER (OUTPATIENT)
Dept: MAMMOGRAPHY | Age: 69
Discharge: HOME | End: 2024-11-06
Payer: MEDICARE

## 2024-11-04 DIAGNOSIS — M81.0 AGE-RELATED OSTEOPOROSIS WITHOUT CURRENT PATHOLOGICAL FRACTURE: ICD-10-CM

## 2024-11-04 PROCEDURE — 77080 DXA BONE DENSITY AXIAL: CPT

## 2024-11-06 DIAGNOSIS — E11.9 TYPE 2 DIABETES MELLITUS WITHOUT COMPLICATION, WITHOUT LONG-TERM CURRENT USE OF INSULIN (HCC): Primary | ICD-10-CM

## 2024-11-15 ENCOUNTER — HOSPITAL ENCOUNTER (OUTPATIENT)
Age: 69
Discharge: HOME | End: 2024-11-15
Payer: MEDICARE

## 2024-11-15 DIAGNOSIS — E11.9 TYPE 2 DIABETES MELLITUS WITHOUT COMPLICATION, WITHOUT LONG-TERM CURRENT USE OF INSULIN (HCC): ICD-10-CM

## 2024-11-15 LAB
ALBUMIN SERPL-MCNC: 4 G/DL (ref 3.5–5.2)
ALP SERPL-CCNC: 74 U/L (ref 35–104)
ALT SERPL-CCNC: 10 U/L (ref 0–32)
ANION GAP SERPL CALCULATED.3IONS-SCNC: 9 MMOL/L (ref 7–16)
AST SERPL-CCNC: 16 U/L (ref 0–31)
BASOPHILS # BLD: 0.06 K/UL (ref 0–0.2)
BASOPHILS NFR BLD: 1 % (ref 0–2)
BILIRUB SERPL-MCNC: 0.5 MG/DL (ref 0–1.2)
BUN SERPL-MCNC: 11 MG/DL (ref 6–23)
CALCIUM SERPL-MCNC: 9.5 MG/DL (ref 8.6–10.2)
CHLORIDE SERPL-SCNC: 97 MMOL/L (ref 98–107)
CO2 SERPL-SCNC: 32 MMOL/L (ref 22–29)
CREAT SERPL-MCNC: 0.9 MG/DL (ref 0.5–1)
EOSINOPHIL # BLD: 0.07 K/UL (ref 0.05–0.5)
EOSINOPHILS RELATIVE PERCENT: 1 % (ref 0–6)
ERYTHROCYTE [DISTWIDTH] IN BLOOD BY AUTOMATED COUNT: 15.6 % (ref 11.5–15)
GFR, ESTIMATED: 66 ML/MIN/1.73M2
GLUCOSE SERPL-MCNC: 141 MG/DL (ref 74–99)
HBA1C MFR BLD: 7.1 % (ref 4–5.6)
HCT VFR BLD AUTO: 48.7 % (ref 34–48)
HGB BLD-MCNC: 15.5 G/DL (ref 11.5–15.5)
IMM GRANULOCYTES # BLD AUTO: 0.03 K/UL (ref 0–0.58)
IMM GRANULOCYTES NFR BLD: 0 % (ref 0–5)
LYMPHOCYTES NFR BLD: 1.52 K/UL (ref 1.5–4)
LYMPHOCYTES RELATIVE PERCENT: 18 % (ref 20–42)
MCH RBC QN AUTO: 27.1 PG (ref 26–35)
MCHC RBC AUTO-ENTMCNC: 31.8 G/DL (ref 32–34.5)
MCV RBC AUTO: 85 FL (ref 80–99.9)
MONOCYTES NFR BLD: 0.71 K/UL (ref 0.1–0.95)
MONOCYTES NFR BLD: 9 % (ref 2–12)
NEUTROPHILS NFR BLD: 71 % (ref 43–80)
NEUTS SEG NFR BLD: 5.95 K/UL (ref 1.8–7.3)
PLATELET # BLD AUTO: 279 K/UL (ref 130–450)
PMV BLD AUTO: 8.9 FL (ref 7–12)
POTASSIUM SERPL-SCNC: 4.1 MMOL/L (ref 3.5–5)
PROT SERPL-MCNC: 7 G/DL (ref 6.4–8.3)
RBC # BLD AUTO: 5.73 M/UL (ref 3.5–5.5)
SODIUM SERPL-SCNC: 138 MMOL/L (ref 132–146)
WBC OTHER # BLD: 8.3 K/UL (ref 4.5–11.5)

## 2024-11-15 PROCEDURE — 80053 COMPREHEN METABOLIC PANEL: CPT

## 2024-11-15 PROCEDURE — 36415 COLL VENOUS BLD VENIPUNCTURE: CPT

## 2024-11-15 PROCEDURE — 83036 HEMOGLOBIN GLYCOSYLATED A1C: CPT

## 2024-11-15 PROCEDURE — 85025 COMPLETE CBC W/AUTO DIFF WBC: CPT

## 2024-11-25 ENCOUNTER — OFFICE VISIT (OUTPATIENT)
Dept: FAMILY MEDICINE CLINIC | Age: 69
End: 2024-11-25

## 2024-11-25 VITALS
HEART RATE: 85 BPM | HEIGHT: 63 IN | DIASTOLIC BLOOD PRESSURE: 64 MMHG | BODY MASS INDEX: 34.12 KG/M2 | OXYGEN SATURATION: 94 % | SYSTOLIC BLOOD PRESSURE: 132 MMHG | TEMPERATURE: 97.3 F | RESPIRATION RATE: 16 BRPM | WEIGHT: 192.6 LBS

## 2024-11-25 DIAGNOSIS — Z23 FLU VACCINE NEED: ICD-10-CM

## 2024-11-25 DIAGNOSIS — E78.2 MIXED HYPERLIPIDEMIA: ICD-10-CM

## 2024-11-25 DIAGNOSIS — M46.1 ARTHRITIS OF SACROILIAC JOINT OF BOTH SIDES (HCC): ICD-10-CM

## 2024-11-25 DIAGNOSIS — E11.9 TYPE 2 DIABETES MELLITUS WITHOUT COMPLICATION, WITHOUT LONG-TERM CURRENT USE OF INSULIN (HCC): Primary | ICD-10-CM

## 2024-11-25 DIAGNOSIS — I35.0 NONRHEUMATIC AORTIC VALVE STENOSIS: ICD-10-CM

## 2024-11-25 DIAGNOSIS — E89.0 POSTOPERATIVE HYPOTHYROIDISM: ICD-10-CM

## 2024-11-25 DIAGNOSIS — I10 ESSENTIAL HYPERTENSION: ICD-10-CM

## 2024-11-25 DIAGNOSIS — M81.0 AGE-RELATED OSTEOPOROSIS WITHOUT CURRENT PATHOLOGICAL FRACTURE: ICD-10-CM

## 2024-11-25 PROBLEM — F33.1 MODERATE EPISODE OF RECURRENT MAJOR DEPRESSIVE DISORDER (HCC): Status: RESOLVED | Noted: 2024-01-31 | Resolved: 2024-11-25

## 2024-11-25 RX ORDER — SITAGLIPTIN 25 MG/1
25 TABLET, FILM COATED ORAL DAILY
COMMUNITY
Start: 2024-11-04

## 2024-11-25 RX ORDER — IBUPROFEN 800 MG/1
800 TABLET, FILM COATED ORAL EVERY 6 HOURS PRN
COMMUNITY

## 2024-11-25 ASSESSMENT — ENCOUNTER SYMPTOMS
WHEEZING: 0
RHINORRHEA: 0
CHEST TIGHTNESS: 0
SINUS PRESSURE: 0
COUGH: 0
DIARRHEA: 0
SHORTNESS OF BREATH: 0
SINUS PAIN: 0
CONSTIPATION: 0
VOMITING: 0
NAUSEA: 0
TROUBLE SWALLOWING: 0
VOICE CHANGE: 0
SORE THROAT: 0
ABDOMINAL PAIN: 0
BACK PAIN: 0
COLOR CHANGE: 0
FACIAL SWELLING: 0

## 2024-11-25 NOTE — ASSESSMENT & PLAN NOTE
Stable at times if she walks too much will get bad, does use [employer] when on vacation. She has seen DR Silva and had injections but only lasted a day. She weaned herself off on the medications DR Silva had her on. Discussed walking short distances several times a day and strength training. Also discussed trying the Advil Dual Action and see if this helps vs taking the ibuprofen 600 mg daily.

## 2024-11-25 NOTE — ASSESSMENT & PLAN NOTE
well controlled and no significant medication side effects noted, Continue Crestor 40 mg nightly,  labs reviewed Lipid 7/19/24 and CMP, CBCD, 11/15 /24 does follow with DR Burnett for the   -Discussed low fat diet, limit fast food, goodies, breads and pastas if consuming several days a week,  limit alcohol consumption as this combined with statin can cause liver problems.  -Discussed weight reduction and exercise 30 minutes 5 days a week for total of 150 minutes weekly.  -Discussed CoQ10 as directed  -Discussed if any unusual muscle aching/pain to contact the office, discussed medication and risk of muscle pain/damage from Rhabdomyolysis

## 2024-11-25 NOTE — ASSESSMENT & PLAN NOTE
Some improvement in some areas and gradually worse in others. Discussed prolia and at this time she declines.   Get enough calcium and vitamin D. The [employer] recommends   Adults 71 and older need 1,200 mg of calcium  in divided doses and do not take at the same as any dairy is being ingested as the body can't absorb large amounts of calcium at one time and 800 IU of vitamin D each day.    Eat foods rich in calcium, like yogurt, cheese, milk, and dark green vegetables. This is a good way to get the calcium you need. You can get vitamin D from eggs, fatty fish, cereal, and milk.  Talk to your doctor about taking a calcium plus vitamin D supplement. Be careful, though.    Adults ages 51 and older should not get more than 2,000 mg of calcium and 4,000 IU of vitamin D each day from supplements and/or food.  Limit alcohol to 2 drinks a day for men and 1 drink a day for women. Too much alcohol can cause health problems.  Do not smoke. Smoking puts you at a much higher risk for osteoporosis. If you need help quitting, talk to your doctor about stop-smoking programs and medicines. These can increase your chances of quitting for good.  Get regular bone-building exercise. Weight-bearing and resistance exercises keep bones healthy by working the muscles and bones against gravity. Start out at an exercise level that feels right for you. Add a little at a time until you can do the following:  Do 30 minutes of weight-bearing exercise on most days of the week. Walking, jogging, stair climbing, and dancing are good choices.  Do resistance exercises with weights or elastic bands 2 to 3 days a week.  Reduce your risk of falls:  Wear supportive shoes with low heels and nonslip soles.  Use a cane or walker, if you need it. Use shower chairs and bath benches. Put in handrails on stairways, around your shower or tub area, and near the toilet.  Keep stairs, porches, and walkways well lit. Use night-lights.  Remove throw rugs

## 2024-11-25 NOTE — PROGRESS NOTES
HIP:     BMD: 0.780 g/cm2     T-score: -1.8     Z-score: -0.3     LEFT FEMORAL NECK:  BMD: 0.712 g/cm2  T-score: -2.3     Z-score: -0.6     FRAX 10-YEAR PROBABILITY OF FRACTURE:     Ten year fracture risk:     Major osteoporotic fracture: 28.4%     Hip fracture: 12.8%     IMPRESSION:  Osteoporosis by WHO criteria.       Surgical History:  has a past surgical history that includes Gallbladder surgery (); Hysterectomy (); Foot surgery; Cholecystectomy (N/A); Total Thyroidectomy ();  section (); hernia repair; subtotal colectomy; Hysterectomy, total abdominal (); and Appendectomy.  Social History:  reports that she has been smoking cigarettes. She started smoking about 49 years ago. She has a 24.9 pack-year smoking history. She has never used smokeless tobacco. She reports that she does not drink alcohol and does not use drugs.  Family History: family history includes Diabetes in her father and mother; Heart Disease in her mother; High Cholesterol in her father and mother.  I have reviewed Selena's allergies, medications, problem list, medical, social and family history and have updated as needed in the electronic medical record”    Review of Systems   Constitutional:  Negative for activity change, appetite change, chills, diaphoresis, fatigue, fever and unexpected weight change.   HENT:  Negative for congestion, dental problem, drooling, ear discharge, ear pain, facial swelling, hearing loss, mouth sores, nosebleeds, postnasal drip, rhinorrhea, sinus pressure, sinus pain, sneezing, sore throat, tinnitus, trouble swallowing and voice change.    Eyes:  Negative for visual disturbance.   Respiratory:  Negative for cough, chest tightness, shortness of breath and wheezing.    Cardiovascular:  Negative for chest pain, palpitations and leg swelling.   Gastrointestinal:  Negative for abdominal pain, constipation, diarrhea, nausea and vomiting.   Endocrine: Negative for cold intolerance, heat

## 2024-11-25 NOTE — ASSESSMENT & PLAN NOTE
reasonably well controlled and no significant medication side effects noted A1c 7.1  %, eGFR >60  continue victoza 1.8 mg daily, januvia 25 mg daily.  fasting labs ordered,  reviewed CMP, CBCD H/H worsening secondary to smoking, lipids stable  BS log reviewed with patient   Monitor BS at different times: 1 day fasting, then next day 2 hours after lunch, next day 2 hours after dinner, next day at bedtime then start over and log all values.  Bring log to next appointment  Discussed hypoglycemia prevention and treatment, take medication regularly don't skip doses or double up.   Foot exam every day; wash and dry well between toes, look for any redness, cracks, wounds notify provider if any problems occur. Be sure to wear hard sole shoe at all times and not go barefooted. Wear proper fitting shoes to prevent ulcer, callous formation.   Reminder for Podiatry visits Q 2 Months for toenail care if needed  Reminder for annual Eye exam  Reminder to keep vaccines up dated  Exercise 30 minutes daily for 150 minutes a week  Recommend Diabetic Education Classes if you have not already attended  Stay up to date on all immunizations

## 2024-11-25 NOTE — ASSESSMENT & PLAN NOTE
well controlled, no significant side effects from medication noted. HCTZ 25 mg daily labs reviewed:CMP, CBCD in epic  11/15/24, Lipids, TSH, FT4, in epic 719/24  -Discussed taking medication as directed every day around the same time. Do not double up if skipped or missed doses.   -Discussed exercising daily 30 minutes 5 times a week for 150 minutes weekly to help with stress reduction and weight reduction if needed.  -Discussed low sodium diet.  -Discussed limiting caffeine consumption and tobacco cessation and the effects they have on the heart and blood pressure.

## 2024-12-05 DIAGNOSIS — E11.9 TYPE 2 DIABETES MELLITUS WITHOUT COMPLICATION, WITHOUT LONG-TERM CURRENT USE OF INSULIN (HCC): ICD-10-CM

## 2024-12-26 ENCOUNTER — TELEPHONE (OUTPATIENT)
Dept: FAMILY MEDICINE CLINIC | Age: 69
End: 2024-12-26

## 2024-12-26 ENCOUNTER — APPOINTMENT (OUTPATIENT)
Dept: ULTRASOUND IMAGING | Age: 69
End: 2024-12-26
Payer: MEDICARE

## 2024-12-26 ENCOUNTER — HOSPITAL ENCOUNTER (EMERGENCY)
Age: 69
Discharge: LEFT BEFORE DISCHARGE | End: 2024-12-26
Payer: MEDICARE

## 2024-12-26 VITALS
RESPIRATION RATE: 18 BRPM | SYSTOLIC BLOOD PRESSURE: 157 MMHG | BODY MASS INDEX: 33.84 KG/M2 | TEMPERATURE: 97.8 F | WEIGHT: 191 LBS | DIASTOLIC BLOOD PRESSURE: 66 MMHG | OXYGEN SATURATION: 94 % | HEART RATE: 87 BPM

## 2024-12-26 DIAGNOSIS — R60.0 BILATERAL LEG EDEMA: Primary | ICD-10-CM

## 2024-12-26 LAB
ALBUMIN SERPL-MCNC: 3.9 G/DL (ref 3.5–5.2)
ALP SERPL-CCNC: 67 U/L (ref 35–104)
ALT SERPL-CCNC: 10 U/L (ref 0–32)
ANION GAP SERPL CALCULATED.3IONS-SCNC: 11 MMOL/L (ref 7–16)
AST SERPL-CCNC: 18 U/L (ref 0–31)
BACTERIA URNS QL MICRO: ABNORMAL
BASOPHILS # BLD: 0.05 K/UL (ref 0–0.2)
BASOPHILS NFR BLD: 1 % (ref 0–2)
BILIRUB SERPL-MCNC: 0.6 MG/DL (ref 0–1.2)
BILIRUB UR QL STRIP: NEGATIVE
BNP SERPL-MCNC: 815 PG/ML (ref 0–125)
BUN SERPL-MCNC: 10 MG/DL (ref 6–23)
CALCIUM SERPL-MCNC: 9.3 MG/DL (ref 8.6–10.2)
CHLORIDE SERPL-SCNC: 91 MMOL/L (ref 98–107)
CLARITY UR: CLEAR
CO2 SERPL-SCNC: 29 MMOL/L (ref 22–29)
COLOR UR: YELLOW
CREAT SERPL-MCNC: 0.9 MG/DL (ref 0.5–1)
EOSINOPHIL # BLD: 0.01 K/UL (ref 0.05–0.5)
EOSINOPHILS RELATIVE PERCENT: 0 % (ref 0–6)
ERYTHROCYTE [DISTWIDTH] IN BLOOD BY AUTOMATED COUNT: 16.3 % (ref 11.5–15)
GFR, ESTIMATED: 73 ML/MIN/1.73M2
GLUCOSE SERPL-MCNC: 151 MG/DL (ref 74–99)
GLUCOSE UR STRIP-MCNC: NEGATIVE MG/DL
HCT VFR BLD AUTO: 47.8 % (ref 34–48)
HGB BLD-MCNC: 15.6 G/DL (ref 11.5–15.5)
HGB UR QL STRIP.AUTO: NEGATIVE
IMM GRANULOCYTES # BLD AUTO: 0.04 K/UL (ref 0–0.58)
IMM GRANULOCYTES NFR BLD: 0 % (ref 0–5)
KETONES UR STRIP-MCNC: NEGATIVE MG/DL
LEUKOCYTE ESTERASE UR QL STRIP: NEGATIVE
LYMPHOCYTES NFR BLD: 1.55 K/UL (ref 1.5–4)
LYMPHOCYTES RELATIVE PERCENT: 15 % (ref 20–42)
MCH RBC QN AUTO: 26.8 PG (ref 26–35)
MCHC RBC AUTO-ENTMCNC: 32.6 G/DL (ref 32–34.5)
MCV RBC AUTO: 82.1 FL (ref 80–99.9)
MONOCYTES NFR BLD: 0.84 K/UL (ref 0.1–0.95)
MONOCYTES NFR BLD: 8 % (ref 2–12)
NEUTROPHILS NFR BLD: 75 % (ref 43–80)
NEUTS SEG NFR BLD: 7.64 K/UL (ref 1.8–7.3)
NITRITE UR QL STRIP: NEGATIVE
PH UR STRIP: 7 [PH] (ref 5–9)
PLATELET # BLD AUTO: 350 K/UL (ref 130–450)
PMV BLD AUTO: 9.2 FL (ref 7–12)
POTASSIUM SERPL-SCNC: 3.8 MMOL/L (ref 3.5–5)
PROT SERPL-MCNC: 6.8 G/DL (ref 6.4–8.3)
PROT UR STRIP-MCNC: ABNORMAL MG/DL
RBC # BLD AUTO: 5.82 M/UL (ref 3.5–5.5)
RBC #/AREA URNS HPF: ABNORMAL /HPF
SODIUM SERPL-SCNC: 131 MMOL/L (ref 132–146)
SP GR UR STRIP: 1.01 (ref 1–1.03)
UROBILINOGEN UR STRIP-ACNC: 0.2 EU/DL (ref 0–1)
WBC #/AREA URNS HPF: ABNORMAL /HPF
WBC OTHER # BLD: 10.1 K/UL (ref 4.5–11.5)
YEAST URNS QL MICRO: PRESENT

## 2024-12-26 PROCEDURE — 93970 EXTREMITY STUDY: CPT

## 2024-12-26 PROCEDURE — 81001 URINALYSIS AUTO W/SCOPE: CPT

## 2024-12-26 PROCEDURE — 85025 COMPLETE CBC W/AUTO DIFF WBC: CPT

## 2024-12-26 PROCEDURE — 80053 COMPREHEN METABOLIC PANEL: CPT

## 2024-12-26 PROCEDURE — 83880 ASSAY OF NATRIURETIC PEPTIDE: CPT

## 2024-12-26 PROCEDURE — 99284 EMERGENCY DEPT VISIT MOD MDM: CPT

## 2024-12-26 RX ORDER — FUROSEMIDE 20 MG/1
20 TABLET ORAL DAILY
Qty: 5 TABLET | Refills: 0 | Status: SHIPPED | OUTPATIENT
Start: 2024-12-26

## 2024-12-26 ASSESSMENT — PAIN DESCRIPTION - LOCATION: LOCATION: LEG

## 2024-12-26 ASSESSMENT — PAIN - FUNCTIONAL ASSESSMENT: PAIN_FUNCTIONAL_ASSESSMENT: 0-10

## 2024-12-26 ASSESSMENT — PAIN DESCRIPTION - DESCRIPTORS: DESCRIPTORS: TIGHTNESS

## 2024-12-26 ASSESSMENT — PAIN DESCRIPTION - ORIENTATION: ORIENTATION: RIGHT;LEFT

## 2024-12-26 ASSESSMENT — PAIN SCALES - GENERAL: PAINLEVEL_OUTOF10: 5

## 2024-12-26 NOTE — ED NOTES
Department of Emergency Medicine  FIRST PROVIDER TRIAGE NOTE             Independent MLP           12/26/24  4:38 PM EST    Date of Encounter: 12/26/24   MRN: [id number]      HPI: Selena Cary is a 69 y.o. female who presents to the ED for Leg Swelling (Bilateral leg and foot swelling x 1 week)   Swollen legs and feet x 1 week. Called PCP's office and they recommended she come in. No CP or SOB. COVID 2 weeks ago.     ROS: Negative for cp or sob.    PE: Gen Appearance/Constitutional: alert  Musculoskeletal: moves all extremities x 4    Initial Plan of Care: All treatment areas with department are currently occupied.      Plan to order/Initiate the following while awaiting opening in ED: Triage evaluation  .     Provider-Patient relationship only established for Provider In Triage (PIT).  Full assessment, HPI and examination not performed, therefore, it is not yet possible to state whether or not an emergency medical condition exists     Initial Plan of Care: Initiate Treatment-Testing, Proceed toTreatment Area When Bed Available for ED Attending/MLP to Continue Care  Secondary to high volume, low staffing, and/or boarding- patient to await bed availability.     This ends my PIT-Patient relationship.  Care of patient relinquished after triage         Electronically signed by GRANT Rivera   DD: 12/26/24       Leola Sharpe PA  12/26/24 [id number]       Leola Sharpe PA  12/26/24 [id number]

## 2024-12-26 NOTE — TELEPHONE ENCOUNTER
Received a call from nurse triage regarding patient asking for a new pt appt and needing triaged for bilateral leg swelling. I notified the ecc that Dr. Song is not accepting new patients at this time and that they need to call the patients pcp because I cannot triage a patient that is not under our care. [facility] states they will call the pt's pcp.    Electronically signed by DIANE BURNS MA on 12/26/24 at 1:56 PM EST

## 2024-12-26 NOTE — TELEPHONE ENCOUNTER
Call transferred from the [facility] for leg swelling.  Advised pt of Gia's recommendation. Pt verbalized understanding.

## 2024-12-26 NOTE — TELEPHONE ENCOUNTER
Left message for Selena to go to the ER for evaluation. I did send in lasix 20 mg daily stop the hydrochlorothiazide while on lasix. But highly recommend ER for evaluation as this could be CHF or kidney problems.

## 2024-12-27 DIAGNOSIS — N30.00 ACUTE CYSTITIS WITHOUT HEMATURIA: Primary | ICD-10-CM

## 2024-12-28 ENCOUNTER — HOSPITAL ENCOUNTER (OUTPATIENT)
Age: 69
Discharge: HOME | End: 2024-12-28
Payer: MEDICARE

## 2024-12-28 DIAGNOSIS — N30.00 ACUTE CYSTITIS WITHOUT HEMATURIA: ICD-10-CM

## 2024-12-28 PROCEDURE — 87086 URINE CULTURE/COLONY COUNT: CPT

## 2024-12-28 PROCEDURE — 36415 COLL VENOUS BLD VENIPUNCTURE: CPT

## 2024-12-30 ENCOUNTER — TELEPHONE (OUTPATIENT)
Dept: FAMILY MEDICINE CLINIC | Age: 69
End: 2024-12-30

## 2024-12-30 LAB
MICROORGANISM SPEC CULT: ABNORMAL
SERVICE CMNT-IMP: ABNORMAL
SPECIMEN DESCRIPTION: ABNORMAL

## 2024-12-30 NOTE — TELEPHONE ENCOUNTER
Called patient occasional burning but no other symptoms at this time wouldn't recommend antibiotic unless symptoms are worsening.

## 2025-01-08 ENCOUNTER — APPOINTMENT (OUTPATIENT)
Dept: GENERAL RADIOLOGY | Age: 70
End: 2025-01-08
Payer: MEDICARE

## 2025-01-08 ENCOUNTER — APPOINTMENT (OUTPATIENT)
Dept: ULTRASOUND IMAGING | Age: 70
End: 2025-01-08
Payer: MEDICARE

## 2025-01-08 ENCOUNTER — HOSPITAL ENCOUNTER (EMERGENCY)
Age: 70
Discharge: HOME | End: 2025-01-08
Attending: EMERGENCY MEDICINE
Payer: MEDICARE

## 2025-01-08 VITALS
SYSTOLIC BLOOD PRESSURE: 130 MMHG | DIASTOLIC BLOOD PRESSURE: 68 MMHG | HEIGHT: 62 IN | WEIGHT: 199 LBS | BODY MASS INDEX: 36.62 KG/M2 | RESPIRATION RATE: 18 BRPM | HEART RATE: 80 BPM | TEMPERATURE: 97.7 F | OXYGEN SATURATION: 92 %

## 2025-01-08 DIAGNOSIS — I73.9 PERIPHERAL ARTERIAL DISEASE (HCC): ICD-10-CM

## 2025-01-08 DIAGNOSIS — R60.0 BILATERAL LEG EDEMA: ICD-10-CM

## 2025-01-08 DIAGNOSIS — I73.9 PERIPHERAL VASCULAR DISEASE (HCC): Primary | ICD-10-CM

## 2025-01-08 LAB
ANION GAP SERPL CALCULATED.3IONS-SCNC: 7 MMOL/L (ref 7–16)
BASOPHILS # BLD: 0.05 K/UL (ref 0–0.2)
BASOPHILS NFR BLD: 1 % (ref 0–2)
BNP SERPL-MCNC: 903 PG/ML (ref 0–450)
BUN SERPL-MCNC: 13 MG/DL (ref 6–23)
CALCIUM SERPL-MCNC: 9.3 MG/DL (ref 8.6–10.2)
CHLORIDE SERPL-SCNC: 99 MMOL/L (ref 98–107)
CO2 SERPL-SCNC: 33 MMOL/L (ref 22–29)
CREAT SERPL-MCNC: 1 MG/DL (ref 0.5–1)
EOSINOPHIL # BLD: 0 K/UL (ref 0.05–0.5)
EOSINOPHILS RELATIVE PERCENT: 0 % (ref 0–6)
ERYTHROCYTE [DISTWIDTH] IN BLOOD BY AUTOMATED COUNT: 17.6 % (ref 11.5–15)
GFR, ESTIMATED: 58 ML/MIN/1.73M2
GLUCOSE SERPL-MCNC: 107 MG/DL (ref 74–99)
HCT VFR BLD AUTO: 47.9 % (ref 34–48)
HGB BLD-MCNC: 15.6 G/DL (ref 11.5–15.5)
IMM GRANULOCYTES # BLD AUTO: 0.03 K/UL (ref 0–0.58)
IMM GRANULOCYTES NFR BLD: 0 % (ref 0–5)
LYMPHOCYTES NFR BLD: 1.33 K/UL (ref 1.5–4)
LYMPHOCYTES RELATIVE PERCENT: 14 % (ref 20–42)
MAGNESIUM SERPL-MCNC: 2.4 MG/DL (ref 1.6–2.6)
MCH RBC QN AUTO: 27.9 PG (ref 26–35)
MCHC RBC AUTO-ENTMCNC: 32.6 G/DL (ref 32–34.5)
MCV RBC AUTO: 85.5 FL (ref 80–99.9)
MONOCYTES NFR BLD: 0.9 K/UL (ref 0.1–0.95)
MONOCYTES NFR BLD: 10 % (ref 2–12)
NEUTROPHILS NFR BLD: 75 % (ref 43–80)
NEUTS SEG NFR BLD: 7.04 K/UL (ref 1.8–7.3)
PLATELET # BLD AUTO: 290 K/UL (ref 130–450)
PMV BLD AUTO: 9.4 FL (ref 7–12)
POTASSIUM SERPL-SCNC: 4.4 MMOL/L (ref 3.5–5)
RBC # BLD AUTO: 5.6 M/UL (ref 3.5–5.5)
SODIUM SERPL-SCNC: 139 MMOL/L (ref 132–146)
WBC OTHER # BLD: 9.4 K/UL (ref 4.5–11.5)

## 2025-01-08 PROCEDURE — 99284 EMERGENCY DEPT VISIT MOD MDM: CPT

## 2025-01-08 PROCEDURE — 71046 X-RAY EXAM CHEST 2 VIEWS: CPT

## 2025-01-08 PROCEDURE — 83735 ASSAY OF MAGNESIUM: CPT

## 2025-01-08 PROCEDURE — 85025 COMPLETE CBC W/AUTO DIFF WBC: CPT

## 2025-01-08 PROCEDURE — 83880 ASSAY OF NATRIURETIC PEPTIDE: CPT

## 2025-01-08 PROCEDURE — 80048 BASIC METABOLIC PNL TOTAL CA: CPT

## 2025-01-08 RX ORDER — FUROSEMIDE 20 MG/1
20 TABLET ORAL DAILY
Qty: 5 TABLET | Refills: 0 | Status: SHIPPED | OUTPATIENT
Start: 2025-01-08

## 2025-01-08 ASSESSMENT — ENCOUNTER SYMPTOMS
WHEEZING: 0
SINUS PRESSURE: 0
COUGH: 1
BACK PAIN: 0
RHINORRHEA: 1
VOMITING: 0
SHORTNESS OF BREATH: 1
ABDOMINAL PAIN: 0
SORE THROAT: 0
DIARRHEA: 0
ABDOMINAL DISTENTION: 0
CHEST TIGHTNESS: 0
NAUSEA: 0

## 2025-01-08 ASSESSMENT — PAIN - FUNCTIONAL ASSESSMENT: PAIN_FUNCTIONAL_ASSESSMENT: NONE - DENIES PAIN

## 2025-01-08 NOTE — TELEPHONE ENCOUNTER
Name of Medication(s) Requested:  Requested Prescriptions     Pending Prescriptions Disp Refills    furosemide (LASIX) 20 MG tablet [Pharmacy Med Name: FUROSEMIDE 20MG TABLETS] 5 tablet 0     Sig: TAKE 1 TABLET BY MOUTH DAILY       Medication is on current medication list Yes    Dosage and directions were verified? Yes    Quantity verified: 5 day supply     Pharmacy Verified?  Yes    Last Appointment:  11/25/2024    Future appts:  Future Appointments   Date Time Provider Department Center   2/3/2025  2:15 PM Rudi Lange, DO [facility] DEP   2/7/2025  9:30 AM [facility] ECHO 1 [facility]        (If no appt send self scheduling link. .REFILLAPPT)  Scheduling request sent?     [] Yes  [x] No    Does patient need updated?  [] Yes  [x] No

## 2025-01-08 NOTE — ED PROVIDER NOTES
Chief complaint:  Foot swelling      HPI history provided by the patient  Patient presents here complaining of mostly foot pain and swelling bilaterally.  Has been ongoing issue for over 4 weeks, she states she was seen 3 weeks ago and had Dopplers of her legs.  She has a follow-up with cardiology and is scheduled for echo and other testing.  She has had some URI symptoms for the last couple weeks with nasal congestion and runny nose and cough feels some intermittent shortness of breath.  No chest pain or palpitations or lightheadedness or syncope no dizziness.  No calf pain or swelling.  Complains of some redness in her toes and swelling in her feet bilaterally.  She continues to smoke cigarettes.  Denies fevers, sweats or chills.    Review of Systems   Constitutional:  Negative for chills, diaphoresis, fatigue and fever.   HENT:  Positive for congestion, postnasal drip and rhinorrhea. Negative for ear pain, sinus pressure and sore throat.    Respiratory:  Positive for cough and shortness of breath. Negative for chest tightness and wheezing.    Cardiovascular:  Positive for leg swelling. Negative for chest pain and palpitations.   Gastrointestinal:  Negative for abdominal distention, abdominal pain, diarrhea, nausea and vomiting.   Genitourinary:  Negative for dysuria, flank pain and frequency.   Musculoskeletal:  Negative for arthralgias, back pain, gait problem, joint swelling, myalgias, neck pain and neck stiffness.   Skin:  Negative for rash and wound.   Neurological:  Negative for dizziness, seizures, syncope, weakness, light-headedness, numbness and headaches.   Hematological:  Negative for adenopathy.   All other systems reviewed and are negative.       Physical Exam  Vitals and nursing note reviewed.   Constitutional:       General: She is awake. She is not in acute distress.     Appearance: She is well-developed. She is not ill-appearing, toxic-appearing or diaphoretic.   HENT:      Head: Normocephalic and

## 2025-01-09 ENCOUNTER — PATIENT MESSAGE (OUTPATIENT)
Dept: FAMILY MEDICINE CLINIC | Age: 70
End: 2025-01-09

## 2025-01-21 ENCOUNTER — OFFICE VISIT (OUTPATIENT)
Dept: FAMILY MEDICINE CLINIC | Age: 70
End: 2025-01-21
Payer: MEDICARE

## 2025-01-21 VITALS
BODY MASS INDEX: 36.76 KG/M2 | SYSTOLIC BLOOD PRESSURE: 124 MMHG | DIASTOLIC BLOOD PRESSURE: 70 MMHG | HEART RATE: 83 BPM | OXYGEN SATURATION: 92 % | WEIGHT: 201 LBS

## 2025-01-21 DIAGNOSIS — I10 ESSENTIAL HYPERTENSION: ICD-10-CM

## 2025-01-21 DIAGNOSIS — J43.9 PULMONARY EMPHYSEMA, UNSPECIFIED EMPHYSEMA TYPE (HCC): ICD-10-CM

## 2025-01-21 DIAGNOSIS — E11.9 TYPE 2 DIABETES MELLITUS WITHOUT COMPLICATION, WITHOUT LONG-TERM CURRENT USE OF INSULIN (HCC): Primary | ICD-10-CM

## 2025-01-21 DIAGNOSIS — E89.0 POSTOPERATIVE HYPOTHYROIDISM: ICD-10-CM

## 2025-01-21 DIAGNOSIS — I70.90 ARTERIOSCLEROTIC VASCULAR DISEASE: ICD-10-CM

## 2025-01-21 DIAGNOSIS — E78.2 MIXED HYPERLIPIDEMIA: ICD-10-CM

## 2025-01-21 DIAGNOSIS — M81.0 AGE-RELATED OSTEOPOROSIS WITHOUT CURRENT PATHOLOGICAL FRACTURE: ICD-10-CM

## 2025-01-21 PROCEDURE — 1123F ACP DISCUSS/DSCN MKR DOCD: CPT | Performed by: FAMILY MEDICINE

## 2025-01-21 PROCEDURE — 3023F SPIROM DOC REV: CPT | Performed by: FAMILY MEDICINE

## 2025-01-21 PROCEDURE — 4004F PT TOBACCO SCREEN RCVD TLK: CPT | Performed by: FAMILY MEDICINE

## 2025-01-21 PROCEDURE — 99214 OFFICE O/P EST MOD 30 MIN: CPT | Performed by: FAMILY MEDICINE

## 2025-01-21 PROCEDURE — 3046F HEMOGLOBIN A1C LEVEL >9.0%: CPT | Performed by: FAMILY MEDICINE

## 2025-01-21 PROCEDURE — G8427 DOCREV CUR MEDS BY ELIG CLIN: HCPCS | Performed by: FAMILY MEDICINE

## 2025-01-21 PROCEDURE — 3017F COLORECTAL CA SCREEN DOC REV: CPT | Performed by: FAMILY MEDICINE

## 2025-01-21 PROCEDURE — 1159F MED LIST DOCD IN RCRD: CPT | Performed by: FAMILY MEDICINE

## 2025-01-21 PROCEDURE — 1160F RVW MEDS BY RX/DR IN RCRD: CPT | Performed by: FAMILY MEDICINE

## 2025-01-21 PROCEDURE — 3074F SYST BP LT 130 MM HG: CPT | Performed by: FAMILY MEDICINE

## 2025-01-21 PROCEDURE — 3078F DIAST BP <80 MM HG: CPT | Performed by: FAMILY MEDICINE

## 2025-01-21 PROCEDURE — 1090F PRES/ABSN URINE INCON ASSESS: CPT | Performed by: FAMILY MEDICINE

## 2025-01-21 PROCEDURE — G8399 PT W/DXA RESULTS DOCUMENT: HCPCS | Performed by: FAMILY MEDICINE

## 2025-01-21 PROCEDURE — G8417 CALC BMI ABV UP PARAM F/U: HCPCS | Performed by: FAMILY MEDICINE

## 2025-01-21 PROCEDURE — 2022F DILAT RTA XM EVC RTNOPTHY: CPT | Performed by: FAMILY MEDICINE

## 2025-01-21 SDOH — ECONOMIC STABILITY: FOOD INSECURITY: WITHIN THE PAST 12 MONTHS, YOU WORRIED THAT YOUR FOOD WOULD RUN OUT BEFORE YOU GOT MONEY TO BUY MORE.: NEVER TRUE

## 2025-01-21 SDOH — ECONOMIC STABILITY: FOOD INSECURITY: WITHIN THE PAST 12 MONTHS, THE FOOD YOU BOUGHT JUST DIDN'T LAST AND YOU DIDN'T HAVE MONEY TO GET MORE.: NEVER TRUE

## 2025-01-21 ASSESSMENT — PATIENT HEALTH QUESTIONNAIRE - PHQ9
3. TROUBLE FALLING OR STAYING ASLEEP: NOT AT ALL
9. THOUGHTS THAT YOU WOULD BE BETTER OFF DEAD, OR OF HURTING YOURSELF: NOT AT ALL
2. FEELING DOWN, DEPRESSED OR HOPELESS: NOT AT ALL
7. TROUBLE CONCENTRATING ON THINGS, SUCH AS READING THE NEWSPAPER OR WATCHING TELEVISION: NOT AT ALL
SUM OF ALL RESPONSES TO PHQ9 QUESTIONS 1 & 2: 0
SUM OF ALL RESPONSES TO PHQ QUESTIONS 1-9: 0
6. FEELING BAD ABOUT YOURSELF - OR THAT YOU ARE A FAILURE OR HAVE LET YOURSELF OR YOUR FAMILY DOWN: NOT AT ALL
SUM OF ALL RESPONSES TO PHQ QUESTIONS 1-9: 0
4. FEELING TIRED OR HAVING LITTLE ENERGY: NOT AT ALL
SUM OF ALL RESPONSES TO PHQ QUESTIONS 1-9: 0
SUM OF ALL RESPONSES TO PHQ QUESTIONS 1-9: 0
5. POOR APPETITE OR OVEREATING: NOT AT ALL
10. IF YOU CHECKED OFF ANY PROBLEMS, HOW DIFFICULT HAVE THESE PROBLEMS MADE IT FOR YOU TO DO YOUR WORK, TAKE CARE OF THINGS AT HOME, OR GET ALONG WITH OTHER PEOPLE: NOT DIFFICULT AT ALL
1. LITTLE INTEREST OR PLEASURE IN DOING THINGS: NOT AT ALL
8. MOVING OR SPEAKING SO SLOWLY THAT OTHER PEOPLE COULD HAVE NOTICED. OR THE OPPOSITE, BEING SO FIGETY OR RESTLESS THAT YOU HAVE BEEN MOVING AROUND A LOT MORE THAN USUAL: NOT AT ALL

## 2025-01-21 NOTE — PROGRESS NOTES
OFFICE PROGRESS NOTE      SUBJECTIVE:        Patient ID:   Selena Cary is a 69 y.o. female who presents for   Chief Complaint   Patient presents with    Follow-up     ER - Legs swelling  Cough  Weight gain    Diabetes     Numbers running higher    Discuss Medications     Insurance wont cover victoza           HPI:     WAS SEEING GIAEZEQUIEL CAMACHO. HERE TO ESTABLISH CARE.  RECHECK BP, CHOLESTEROL,COPD, HYPOTHYROIDISM  AND DIABETES.  WAS SEEN AT [facility] ON 1/8/2025. WAS TOLD HAD POOR CIRCULATION IN THE LEGS AND ADVISED VASCULAR SURGEON EVALUATION. REQUEST CONSULT. DOES HAVE SOME PAIN WHEN WALKING.  MEDICATION REFILL.  WATCHING DIET BUT NOT GOOD.  NO  EXERCISE.  TAKING MEDICATIONS REGULARLY.         Prior to Admission medications    Medication Sig Start Date End Date Taking? Authorizing Provider   empagliflozin (JARDIANCE) 10 MG tablet Take 1 tablet by mouth daily 1/21/25  Yes Carl Sheffield MD   SITagliptin (JANUVIA) 50 MG tablet Take 1 tablet by mouth daily 12/5/24  Yes Gia Hollins APRN - CNP   hydroCHLOROthiazide (HYDRODIURIL) 25 MG tablet TAKE 1 TABLET ONCE DAILY FOR BLOOD PRESSURE 8/1/24  Yes Gia Hollins APRN - CNP   levothyroxine (SYNTHROID) 75 MCG tablet Take 1 tablet by mouth daily 1 hour before breakfast For thyroid 8/1/24  Yes Gia Hollins APRN - CNP   rosuvastatin (CRESTOR) 40 MG tablet Take 1 tablet by mouth at bedtime For cholesterol, 8/1/24  Yes Gia Hollins APRN - CNP   tiotropium-olodaterol (STIOLTO RESPIMAT) 2.5-2.5 MCG/ACT AERS Inhale 2 puffs into the lungs daily 8/1/24  Yes Gia Hollins APRN - CNP   alendronate (FOSAMAX) 70 MG tablet Take 1 tablet by mouth every 7 days Take on empty stomach with full glass of water and sit up for 30 minutes, no other medications at the same time 8/1/24  Yes Gia Hollins APRN - CNP   Blood Glucose Monitoring Suppl (TRUE METRIX AIR GLUCOSE METER) PATRICE 1 Device by Does not apply route daily 8/8/23  Yes Gurvinder

## 2025-01-21 NOTE — PATIENT INSTRUCTIONS
LOW SALT FOR BLOOD PRESSURE CONTROL.  LOW CARBOHYDRATE FOR BLOOD SUGAR AND WEIGHT CONTROL.  LOW FAT DIET FOR CHOLESTEROL CONTROL.  DRINK ENOUGH FLUIDS FOR BETTER KIDNEY FUNCTION.  TAKE HCT 25 MG. DAILY FOR BLOOD PRESSURE CONTROL.  TAKE  JANUVIA 50 MG. AND JARDIANCE 10 MG. DAILY FOR BLOOD SUGAR CONTROL.  TAKE  CRESTOR 40 MG. NIGHTLY  FOR CHOLESTEROL CONTROL..  TAKE  SYNTHROID 75 MG. FOR THYROID DEFICIENCY.  INHALE STIOLOTO 2 PUFFS DAILY FOR BREATHING RELIEF.  TAKE FOSSAMAX 70 MG. WEEKLY FOR OSTEOPOROSIS.  ALSO TAKE OSCAL 500 MG. WITH VITAMIN D 1 TABLET 2 TIMES  A DAY.  ADVISED TO QUIT SMOKING.  REGULAR WALKING ADVISED.  ADVISED WEIGHT REDUCTION.  NEXT APPOINTMENT IN 1 MONTH.

## 2025-01-28 ENCOUNTER — TELEPHONE (OUTPATIENT)
Dept: VASCULAR SURGERY | Age: 70
End: 2025-01-28

## 2025-02-07 ENCOUNTER — HOSPITAL ENCOUNTER (OUTPATIENT)
Age: 70
Discharge: HOME | End: 2025-02-09
Payer: MEDICARE

## 2025-02-07 DIAGNOSIS — E78.2 MIXED HYPERLIPIDEMIA: ICD-10-CM

## 2025-02-07 DIAGNOSIS — I10 ESSENTIAL HYPERTENSION: ICD-10-CM

## 2025-02-07 DIAGNOSIS — E89.0 POSTOPERATIVE HYPOTHYROIDISM: ICD-10-CM

## 2025-02-07 DIAGNOSIS — I35.0 NONRHEUMATIC AORTIC VALVE STENOSIS: ICD-10-CM

## 2025-02-07 LAB
ECHO AR MAX VEL PISA: 3.8 M/S
ECHO AV AREA PEAK VELOCITY: 2.6 CM2
ECHO AV AREA VTI: 2.1 CM2
ECHO AV MEAN GRADIENT: 17 MMHG
ECHO AV MEAN VELOCITY: 2 M/S
ECHO AV PEAK GRADIENT: 30 MMHG
ECHO AV PEAK VELOCITY: 2.7 M/S
ECHO AV REGURGITANT PHT: 292.2 MS
ECHO AV VELOCITY RATIO: 0.81
ECHO AV VTI: 47.9 CM
ECHO EST RA PRESSURE: 3 MMHG
ECHO LA VOL A-L A4C: 50 ML (ref 22–52)
ECHO LA VOL MOD A4C: 47 ML (ref 22–52)
ECHO LV EDV A4C: 110 ML
ECHO LV EF PHYSICIAN: 75 %
ECHO LV EJECTION FRACTION A4C: 53 %
ECHO LV ESV A4C: 51 ML
ECHO LV FRACTIONAL SHORTENING: 49 % (ref 28–44)
ECHO LV INTERNAL DIMENSION DIASTOLIC: 3.7 CM (ref 3.9–5.3)
ECHO LV INTERNAL DIMENSION SYSTOLIC: 1.9 CM
ECHO LV IVSD: 1.7 CM (ref 0.6–0.9)
ECHO LV MASS 2D: 243.8 G (ref 67–162)
ECHO LV POSTERIOR WALL DIASTOLIC: 1.6 CM (ref 0.6–0.9)
ECHO LV RELATIVE WALL THICKNESS RATIO: 0.86
ECHO LVOT AREA: 3.1 CM2
ECHO LVOT AV VTI INDEX: 0.65
ECHO LVOT DIAM: 2 CM
ECHO LVOT MEAN GRADIENT: 10 MMHG
ECHO LVOT PEAK GRADIENT: 19 MMHG
ECHO LVOT PEAK VELOCITY: 2.2 M/S
ECHO LVOT SV: 98.3 ML
ECHO LVOT VTI: 31.3 CM
ECHO RV INTERNAL DIMENSION: 2.7 CM

## 2025-02-07 PROCEDURE — 93308 TTE F-UP OR LMTD: CPT | Performed by: INTERNAL MEDICINE

## 2025-02-07 PROCEDURE — 93321 DOPPLER ECHO F-UP/LMTD STD: CPT | Performed by: INTERNAL MEDICINE

## 2025-02-07 PROCEDURE — 93308 TTE F-UP OR LMTD: CPT

## 2025-02-07 PROCEDURE — 93325 DOPPLER ECHO COLOR FLOW MAPG: CPT | Performed by: INTERNAL MEDICINE

## 2025-02-07 RX ORDER — LEVOTHYROXINE SODIUM 75 UG/1
75 TABLET ORAL DAILY
Qty: 90 TABLET | Refills: 0 | Status: SHIPPED | OUTPATIENT
Start: 2025-02-07

## 2025-02-07 RX ORDER — ROSUVASTATIN CALCIUM 40 MG/1
40 TABLET, COATED ORAL NIGHTLY
Qty: 90 TABLET | Refills: 0 | Status: SHIPPED | OUTPATIENT
Start: 2025-02-07

## 2025-02-07 RX ORDER — HYDROCHLOROTHIAZIDE 25 MG/1
TABLET ORAL
Qty: 90 TABLET | Refills: 0 | Status: SHIPPED | OUTPATIENT
Start: 2025-02-07

## 2025-02-07 NOTE — TELEPHONE ENCOUNTER
Name of Medication(s) Requested:  Requested Prescriptions     Pending Prescriptions Disp Refills    hydroCHLOROthiazide (HYDRODIURIL) 25 MG tablet [Pharmacy Med Name: HYDROCHLOROTHIAZIDE 25MG TABLETS] 90 tablet 0     Sig: TAKE 1 TABLET BY MOUTH DAILY FOR BLOOD PRESSURE    levothyroxine (SYNTHROID) 75 MCG tablet [Pharmacy Med Name: LEVOTHYROXINE 0.075MG (75MCG) TABS] 90 tablet 0     Sig: TAKE 1 TABLET BY MOUTH DAILY 1 HOUR BEFORE BREAKFAST FOR THYROID    rosuvastatin (CRESTOR) 40 MG tablet [Pharmacy Med Name: ROSUVASTATIN 40MG TABLETS] 90 tablet 0     Sig: TAKE 1 TABLET BY MOUTH AT BEDTIME FOR CHOLESTEROL       Medication is on current medication list Yes    Dosage and directions were verified? Yes    Quantity verified: 90 day supply     Pharmacy Verified?  Yes    Last Appointment:  11/25/2024    Future appts:  Future Appointments   Date Time Provider Department Center   2/7/2025  9:30 AM [facility] ECHO 1 [facility]   2/26/2025  1:30 PM Carl Sheffield MD [place] [facility] DEP   3/28/2025  1:30 PM Shaheen Meza MD [facility]        (If no appt send self scheduling link. .REFILLAPPT)  Scheduling request sent?     [] Yes  [x] No    Does patient need updated?  [] Yes  [x] No

## 2025-02-08 DIAGNOSIS — M81.0 AGE-RELATED OSTEOPOROSIS WITHOUT CURRENT PATHOLOGICAL FRACTURE: ICD-10-CM

## 2025-02-10 NOTE — TELEPHONE ENCOUNTER
Name of Medication(s) Requested:  Requested Prescriptions     Pending Prescriptions Disp Refills    alendronate (FOSAMAX) 70 MG tablet [Pharmacy Med Name: ALENDRONATE 70MG TABLETS] 12 tablet 0     Sig: TAKE 1 TABLET BY MOUTH EVERY 7 DAYS. TAKE ON AN EMPTY STOMACH WITH A FULL GLASS OF WATER AND SIT UP FOR 30 MINUTES. NO MEDS AT THE SAME TIME.       Medication is on current medication list Yes    Dosage and directions were verified? Yes    Quantity verified: 90 day supply     Pharmacy Verified?  Yes    Last Appointment:  11/25/2024    Future appts:  Future Appointments   Date Time Provider Department Center   2/26/2025  1:30 PM Carl Sheffield MD [place] [facility]   3/28/2025  1:30 PM Shaheen Meza MD [facility]        (If no appt send self scheduling link. .REFILLAPPT)  Scheduling request sent?     [] Yes  [x] No    Does patient need updated?  [] Yes  [x] No   Male

## 2025-02-11 RX ORDER — ALENDRONATE SODIUM 70 MG/1
TABLET ORAL
Qty: 12 TABLET | Refills: 0 | Status: SHIPPED | OUTPATIENT
Start: 2025-02-11

## 2025-02-11 NOTE — TELEPHONE ENCOUNTER
Name of Medication(s) Requested:  Requested Prescriptions     Pending Prescriptions Disp Refills    empagliflozin (JARDIANCE) 10 MG tablet 30 tablet 2     Sig: Take 1 tablet by mouth daily       Medication is on current medication list Yes    Dosage and directions were verified? Yes    Quantity verified: 90 day supply     Pharmacy Verified?  Yes    Last Appointment:  1/21/2025    Future appts:  Future Appointments   Date Time Provider Department Center   2/26/2025  1:30 PM Carl Sheffield MD [place] [facility]   3/28/2025  1:30 PM Shaheen Meza MD [facility]        (If no appt send self scheduling link. .REFILLAPPT)  Scheduling request sent?     [] Yes  [x] No    Does patient need updated?  [] Yes  [x] No

## 2025-02-16 DIAGNOSIS — E11.9 TYPE 2 DIABETES MELLITUS WITHOUT COMPLICATION, WITHOUT LONG-TERM CURRENT USE OF INSULIN: ICD-10-CM

## 2025-02-17 DIAGNOSIS — E11.9 TYPE 2 DIABETES MELLITUS WITHOUT COMPLICATION, WITHOUT LONG-TERM CURRENT USE OF INSULIN: ICD-10-CM

## 2025-02-17 RX ORDER — SITAGLIPTIN 25 MG/1
25 TABLET, FILM COATED ORAL DAILY
Qty: 90 TABLET | Refills: 1 | OUTPATIENT
Start: 2025-02-17

## 2025-02-20 RX ORDER — DULOXETIN HYDROCHLORIDE 60 MG/1
60 CAPSULE, DELAYED RELEASE ORAL DAILY
Qty: 180 CAPSULE | OUTPATIENT
Start: 2025-02-20

## 2025-02-26 ENCOUNTER — OFFICE VISIT (OUTPATIENT)
Dept: FAMILY MEDICINE CLINIC | Age: 70
End: 2025-02-26
Payer: MEDICARE

## 2025-02-26 VITALS
BODY MASS INDEX: 36.4 KG/M2 | WEIGHT: 199 LBS | SYSTOLIC BLOOD PRESSURE: 128 MMHG | DIASTOLIC BLOOD PRESSURE: 78 MMHG | HEART RATE: 87 BPM | OXYGEN SATURATION: 92 %

## 2025-02-26 DIAGNOSIS — E11.9 TYPE 2 DIABETES MELLITUS WITHOUT COMPLICATION, WITHOUT LONG-TERM CURRENT USE OF INSULIN (HCC): Primary | ICD-10-CM

## 2025-02-26 DIAGNOSIS — E78.2 MIXED HYPERLIPIDEMIA: ICD-10-CM

## 2025-02-26 DIAGNOSIS — E89.0 POSTOPERATIVE HYPOTHYROIDISM: ICD-10-CM

## 2025-02-26 DIAGNOSIS — J43.9 PULMONARY EMPHYSEMA, UNSPECIFIED EMPHYSEMA TYPE (HCC): ICD-10-CM

## 2025-02-26 DIAGNOSIS — I10 ESSENTIAL HYPERTENSION: ICD-10-CM

## 2025-02-26 PROBLEM — M25.532 WRIST PAIN, ACUTE, LEFT: Status: RESOLVED | Noted: 2021-11-29 | Resolved: 2025-02-26

## 2025-02-26 PROBLEM — G89.29 CHRONIC RIGHT SHOULDER PAIN: Status: RESOLVED | Noted: 2021-04-20 | Resolved: 2025-02-26

## 2025-02-26 PROBLEM — M25.551 CHRONIC HIP PAIN, BILATERAL: Status: RESOLVED | Noted: 2021-04-20 | Resolved: 2025-02-26

## 2025-02-26 PROBLEM — D58.2 ELEVATED HEMOGLOBIN: Status: RESOLVED | Noted: 2021-08-23 | Resolved: 2025-02-26

## 2025-02-26 PROBLEM — G89.29 CHRONIC HIP PAIN, BILATERAL: Status: RESOLVED | Noted: 2021-04-20 | Resolved: 2025-02-26

## 2025-02-26 PROBLEM — Z23 FLU VACCINE NEED: Status: RESOLVED | Noted: 2023-10-30 | Resolved: 2025-02-26

## 2025-02-26 PROBLEM — M25.552 CHRONIC HIP PAIN, BILATERAL: Status: RESOLVED | Noted: 2021-04-20 | Resolved: 2025-02-26

## 2025-02-26 PROBLEM — M65.4 DE QUERVAIN'S TENOSYNOVITIS, LEFT: Status: RESOLVED | Noted: 2021-11-29 | Resolved: 2025-02-26

## 2025-02-26 PROBLEM — M25.511 CHRONIC RIGHT SHOULDER PAIN: Status: RESOLVED | Noted: 2021-04-20 | Resolved: 2025-02-26

## 2025-02-26 LAB — HBA1C MFR BLD: 7.5 %

## 2025-02-26 PROCEDURE — 99214 OFFICE O/P EST MOD 30 MIN: CPT | Performed by: FAMILY MEDICINE

## 2025-02-26 PROCEDURE — 83036 HEMOGLOBIN GLYCOSYLATED A1C: CPT | Performed by: FAMILY MEDICINE

## 2025-02-26 PROCEDURE — G8399 PT W/DXA RESULTS DOCUMENT: HCPCS | Performed by: FAMILY MEDICINE

## 2025-02-26 PROCEDURE — 1123F ACP DISCUSS/DSCN MKR DOCD: CPT | Performed by: FAMILY MEDICINE

## 2025-02-26 PROCEDURE — 3074F SYST BP LT 130 MM HG: CPT | Performed by: FAMILY MEDICINE

## 2025-02-26 PROCEDURE — G8427 DOCREV CUR MEDS BY ELIG CLIN: HCPCS | Performed by: FAMILY MEDICINE

## 2025-02-26 PROCEDURE — 3017F COLORECTAL CA SCREEN DOC REV: CPT | Performed by: FAMILY MEDICINE

## 2025-02-26 PROCEDURE — G8417 CALC BMI ABV UP PARAM F/U: HCPCS | Performed by: FAMILY MEDICINE

## 2025-02-26 PROCEDURE — 2022F DILAT RTA XM EVC RTNOPTHY: CPT | Performed by: FAMILY MEDICINE

## 2025-02-26 PROCEDURE — 4004F PT TOBACCO SCREEN RCVD TLK: CPT | Performed by: FAMILY MEDICINE

## 2025-02-26 PROCEDURE — 1159F MED LIST DOCD IN RCRD: CPT | Performed by: FAMILY MEDICINE

## 2025-02-26 PROCEDURE — 3051F HG A1C>EQUAL 7.0%<8.0%: CPT | Performed by: FAMILY MEDICINE

## 2025-02-26 PROCEDURE — 3078F DIAST BP <80 MM HG: CPT | Performed by: FAMILY MEDICINE

## 2025-02-26 PROCEDURE — 3023F SPIROM DOC REV: CPT | Performed by: FAMILY MEDICINE

## 2025-02-26 PROCEDURE — 1090F PRES/ABSN URINE INCON ASSESS: CPT | Performed by: FAMILY MEDICINE

## 2025-02-26 RX ORDER — GABAPENTIN 300 MG/1
300 CAPSULE ORAL 2 TIMES DAILY
Qty: 180 CAPSULE | Refills: 1 | Status: SHIPPED | OUTPATIENT
Start: 2025-02-26 | End: 2025-08-25

## 2025-02-26 NOTE — PROGRESS NOTES
OFFICE PROGRESS NOTE      SUBJECTIVE:        Patient ID:   Selena Cary is a 69 y.o. female who presents for   Chief Complaint   Patient presents with    Foot Swelling    Diabetes    Discuss Medications     Gabapentin and vistiril           HPI:     RECHECK BP, CHOLESTEROL, HYPOTHYROIDISM AND DIABETES.  HAS PAINS IN THE CALF AREA. HAS BEEN CHECKED FOR DVT AND TEST CAME BACK NORMAL. HAS BEEN SCHEDULED TO SEE VASCULAR SURGEON. HAS APPOINTMENT IN MARCH 2025. REQUEST GABAPENTIN FOR RELIEF.  MEDICATION REFILL.  DID NOT GET BLOOD TEST DONE BUT WILL DO SO SOON.  WATCHING DIET GOOD.  WALKING FOR EXERCISE.  TAKING MEDICATIONS REGULARLY.   STILL SMOKING 1/2 PACK DAILY. NO DESIRE TO QUIT YET.      Prior to Admission medications    Medication Sig Start Date End Date Taking? Authorizing Provider   Semaglutide,0.25 or 0.5MG/DOS, 2 MG/3ML SOPN Inject 0.25 mg into the skin every 7 days 2/26/25  Yes Carl Sheffield MD   gabapentin (NEURONTIN) 300 MG capsule Take 1 capsule by mouth 2 times daily for 180 days. Intended supply: 90 days 2/26/25 8/25/25 Yes Carl Sheffield MD   alendronate (FOSAMAX) 70 MG tablet TAKE 1 TABLET BY MOUTH EVERY 7 DAYS. TAKE ON AN EMPTY STOMACH WITH A FULL GLASS OF WATER AND SIT UP FOR 30 MINUTES. NO MEDS AT THE SAME TIME. 2/11/25   Carl Sheffield MD   empagliflozin (JARDIANCE) 10 MG tablet Take 1 tablet by mouth daily 2/11/25   Carl Sheffield MD   hydroCHLOROthiazide (HYDRODIURIL) 25 MG tablet TAKE 1 TABLET BY MOUTH DAILY FOR BLOOD PRESSURE 2/7/25   Carl Sheffield MD   levothyroxine (SYNTHROID) 75 MCG tablet TAKE 1 TABLET BY MOUTH DAILY 1 HOUR BEFORE BREAKFAST FOR THYROID 2/7/25   Carl Sheffield MD   rosuvastatin (CRESTOR) 40 MG tablet TAKE 1 TABLET BY MOUTH AT BEDTIME FOR CHOLESTEROL 2/7/25   Carl Sheffield MD   SITagliptin (JANUVIA) 50 MG tablet Take 1 tablet by mouth daily 12/5/24   Gia Hollins, APRN - CNP   tiotropium-olodaterol (STIOLTO RESPIMAT)

## 2025-02-26 NOTE — PATIENT INSTRUCTIONS
LOW SALT FOR BLOOD PRESSURE CONTROL.  LOW CARBOHYDRATE FOR BLOOD SUGAR AND WEIGHT CONTROL.  LOW FAT DIET FOR CHOLESTEROL CONTROL.  DRINK ENOUGH FLUIDS FOR BETTER KIDNEY FUNCTION.  TAKE HCT 25 MG. DAILY FOR BLOOD PRESSURE CONTROL.  TAKE  JANUVIA 50 MG. AND JARDIANCE 10 MG. DAILY FOR BLOOD SUGAR CONTROL.  INJECT OZEMPIC 0.25 MG. SUBCUTANEOUSLY WEEKLY.    TAKE  CRESTOR 40 MG. NIGHTLY  FOR CHOLESTEROL CONTROL..  TAKE  SYNTHROID 75 MG. FOR THYROID DEFICIENCY.  INHALE STIOLOTO 2 PUFFS DAILY FOR BREATHING RELIEF.  TAKE FOSSAMAX 70 MG. WEEKLY FOR OSTEOPOROSIS.  ALSO TAKE OSCAL 500 MG. WITH VITAMIN D 1 TABLET 2 TIMES  A DAY.  TAKE GABAPENTIN 300 MG. 2 TIMES A DAY FOR LEG PAINS.  ADVISED TO QUIT SMOKING.  REGULAR WALKING ADVISED.  ADVISED WEIGHT REDUCTION.  SEE VASCULAR SURGEON AS SCHEDULED.  FASTING FOR LAB WORK ONE MORNING.  NEXT APPOINTMENT IN 2 MONTHS.

## 2025-03-01 DIAGNOSIS — R60.0 BILATERAL LEG EDEMA: ICD-10-CM

## 2025-03-03 DIAGNOSIS — R60.0 BILATERAL LEG EDEMA: ICD-10-CM

## 2025-03-03 RX ORDER — FUROSEMIDE 20 MG/1
20 TABLET ORAL DAILY
Qty: 5 TABLET | Refills: 0 | OUTPATIENT
Start: 2025-03-03

## 2025-03-10 DIAGNOSIS — J43.9 PULMONARY EMPHYSEMA, UNSPECIFIED EMPHYSEMA TYPE (HCC): ICD-10-CM

## 2025-03-10 NOTE — TELEPHONE ENCOUNTER
Name of Medication(s) Requested:  Requested Prescriptions     Pending Prescriptions Disp Refills    STIOLTO RESPIMAT 2.5-2.5 MCG/ACT AERS [Pharmacy Med Name: STIOLTO RESPIMAT 2.5/2.5MCG INH 4GM] 12 g      Sig: INHALE 2 PUFFS INTO THE LUNGS DAILY       Medication is on current medication list Yes    Dosage and directions were verified? Yes    Quantity verified: 30 day supply     Pharmacy Verified?  Yes    Last Appointment:  11/25/2024    Future appts:  Future Appointments   Date Time Provider Department Center   3/28/2025  1:30 PM Shaheen Meza MD [facility]   4/29/2025  2:45 PM [facility]        (If no appt send self scheduling link. .REFILLAPPT)  Scheduling request sent?     [] Yes  [x] No    Does patient need updated?  [] Yes  [x] No

## 2025-03-11 RX ORDER — TIOTROPIUM BROMIDE AND OLODATEROL 3.124; 2.736 UG/1; UG/1
2 SPRAY, METERED RESPIRATORY (INHALATION) DAILY
Qty: 12 G | Refills: 1 | Status: SHIPPED | OUTPATIENT
Start: 2025-03-11

## 2025-03-12 RX ORDER — VENLAFAXINE HCL 150 MG
150 CAPSULE, EXT RELEASE 24 HR ORAL DAILY
Qty: 30 CAPSULE | Refills: 3
Start: 2025-03-12

## 2025-03-13 ENCOUNTER — HOSPITAL ENCOUNTER (OUTPATIENT)
Age: 70
Discharge: HOME | End: 2025-03-13
Payer: MEDICARE

## 2025-03-13 DIAGNOSIS — E89.0 POSTOPERATIVE HYPOTHYROIDISM: ICD-10-CM

## 2025-03-13 DIAGNOSIS — E11.9 TYPE 2 DIABETES MELLITUS WITHOUT COMPLICATION, WITHOUT LONG-TERM CURRENT USE OF INSULIN (HCC): ICD-10-CM

## 2025-03-13 DIAGNOSIS — E78.2 MIXED HYPERLIPIDEMIA: ICD-10-CM

## 2025-03-13 LAB
ALBUMIN SERPL-MCNC: 4.1 G/DL (ref 3.5–5.2)
ALP SERPL-CCNC: 64 U/L (ref 35–104)
ALT SERPL-CCNC: 11 U/L (ref 0–32)
ANION GAP SERPL CALCULATED.3IONS-SCNC: 11 MMOL/L (ref 7–16)
AST SERPL-CCNC: 17 U/L (ref 0–31)
BILIRUB SERPL-MCNC: 0.5 MG/DL (ref 0–1.2)
BUN SERPL-MCNC: 12 MG/DL (ref 6–23)
CALCIUM SERPL-MCNC: 9.2 MG/DL (ref 8.6–10.2)
CHLORIDE SERPL-SCNC: 98 MMOL/L (ref 98–107)
CHOLEST SERPL-MCNC: 125 MG/DL
CO2 SERPL-SCNC: 29 MMOL/L (ref 22–29)
CREAT SERPL-MCNC: 0.9 MG/DL (ref 0.5–1)
CREAT UR-MCNC: 63.5 MG/DL (ref 29–226)
GFR, ESTIMATED: 72 ML/MIN/1.73M2
GLUCOSE SERPL-MCNC: 126 MG/DL (ref 74–99)
HDLC SERPL-MCNC: 51 MG/DL
LDLC SERPL CALC-MCNC: 50 MG/DL
MICROALBUMIN UR-MCNC: 33 MG/L (ref 0–19)
MICROALBUMIN/CREAT UR-RTO: 51 MCG/MG CREAT (ref 0–30)
POTASSIUM SERPL-SCNC: 3.8 MMOL/L (ref 3.5–5)
PROT SERPL-MCNC: 7.3 G/DL (ref 6.4–8.3)
SODIUM SERPL-SCNC: 138 MMOL/L (ref 132–146)
T4 SERPL-MCNC: 6.6 UG/DL (ref 4.5–11.7)
TRIGL SERPL-MCNC: 119 MG/DL
TSH SERPL DL<=0.05 MIU/L-ACNC: 6.38 UIU/ML (ref 0.27–4.2)
VLDLC SERPL CALC-MCNC: 24 MG/DL

## 2025-03-13 PROCEDURE — 36415 COLL VENOUS BLD VENIPUNCTURE: CPT

## 2025-03-13 PROCEDURE — 82570 ASSAY OF URINE CREATININE: CPT

## 2025-03-13 PROCEDURE — 82043 UR ALBUMIN QUANTITATIVE: CPT

## 2025-03-13 PROCEDURE — 80053 COMPREHEN METABOLIC PANEL: CPT

## 2025-03-13 PROCEDURE — 84436 ASSAY OF TOTAL THYROXINE: CPT

## 2025-03-13 PROCEDURE — 80061 LIPID PANEL: CPT

## 2025-03-13 PROCEDURE — 84443 ASSAY THYROID STIM HORMONE: CPT

## 2025-03-14 ENCOUNTER — RESULTS FOLLOW-UP (OUTPATIENT)
Dept: FAMILY MEDICINE CLINIC | Age: 70
End: 2025-03-14

## 2025-03-14 NOTE — RESULT ENCOUNTER NOTE
KIDNEY FUNCTION BORDERLINE LOW. WILL MONITOR.  TSH LEVEL HIGH.DISCUSS NEXT VISIT.   PLEASE ACKNOWLEDGE RECEIPT OF INFORMATION BY REPLYING THE MESSAGE. THANKS.

## 2025-03-28 ENCOUNTER — OFFICE VISIT (OUTPATIENT)
Age: 70
End: 2025-03-28
Payer: MEDICARE

## 2025-03-28 DIAGNOSIS — I83.893 SYMPTOMATIC VARICOSE VEINS OF BOTH LOWER EXTREMITIES: Primary | ICD-10-CM

## 2025-03-28 PROCEDURE — 1123F ACP DISCUSS/DSCN MKR DOCD: CPT | Performed by: SURGERY

## 2025-03-28 PROCEDURE — 4004F PT TOBACCO SCREEN RCVD TLK: CPT | Performed by: SURGERY

## 2025-03-28 PROCEDURE — 99204 OFFICE O/P NEW MOD 45 MIN: CPT | Performed by: SURGERY

## 2025-03-28 PROCEDURE — 1159F MED LIST DOCD IN RCRD: CPT | Performed by: SURGERY

## 2025-03-28 PROCEDURE — G8399 PT W/DXA RESULTS DOCUMENT: HCPCS | Performed by: SURGERY

## 2025-03-28 PROCEDURE — 1090F PRES/ABSN URINE INCON ASSESS: CPT | Performed by: SURGERY

## 2025-03-28 PROCEDURE — 3017F COLORECTAL CA SCREEN DOC REV: CPT | Performed by: SURGERY

## 2025-03-28 PROCEDURE — G8417 CALC BMI ABV UP PARAM F/U: HCPCS | Performed by: SURGERY

## 2025-03-28 PROCEDURE — G8427 DOCREV CUR MEDS BY ELIG CLIN: HCPCS | Performed by: SURGERY

## 2025-03-28 RX ORDER — ASPIRIN 81 MG/1
81 TABLET ORAL DAILY
COMMUNITY

## 2025-03-28 NOTE — PROGRESS NOTES
UE 5/5 strength   No cyanosis noted in nail beds  R LE Edema present   Varicose veins minimal, mostly spider veins   L LE Edema present   Varicose veins minimal, mostly spider veins  Venous stasis changes, brawny edema bilaterally   R femoral 2+ L femoral 2+   R dorsalis pedis 2+ L dorsalis pedis 2+   R posterior tibial 2+ L posterior tibial 2+     RADIOLOGY:    A/PSymptomatic Varicose Veins of Bilateral LE   Etiology is likely associated with venous reflux  I explained to them the pathophysiology of venous reflux and the symptoms associated with it including swelling, pain, edema, heaviness, and even ulceration  Elevate Bilateral LE while in bed or sitting  Compression stockings knee high 8-15 mm hg wear daily - copper fit  Discussed how this is the first line of therapy  They understand even if surgical intervention was felt to be appropriate in the future they would need to wear compression stockings lifetime  F/U as outpatient in 2 months, if she is feeling well she will cancel this apptmt  Call if patient develops worsening of swelling or wounds  All ?s answered    Shaheen Meza MD

## 2025-03-31 ENCOUNTER — TELEPHONE (OUTPATIENT)
Dept: VASCULAR SURGERY | Age: 70
End: 2025-03-31

## 2025-04-14 NOTE — TELEPHONE ENCOUNTER
Name of Medication(s) Requested:  Requested Prescriptions     Pending Prescriptions Disp Refills    EFFEXOR  MG extended release capsule 30 capsule 3     Sig: Take 1 capsule by mouth daily       Medication is on current medication list Yes    Dosage and directions were verified? Yes    Quantity verified: 30 day supply     Pharmacy Verified?  Yes    Last Appointment:  2/26/2025    Future appts:  Future Appointments   Date Time Provider Department Center   4/29/2025  2:45 PM Carl Sheffield MD [place] [facility]   6/2/2025  9:00 AM Shaheen Meza MD [facility]        (If no appt send self scheduling link. .REFILLAPPT)  Scheduling request sent?     [] Yes  [x] No    Does patient need updated?  [] Yes  [x] No

## 2025-04-15 RX ORDER — VENLAFAXINE HYDROCHLORIDE 150 MG/1
150 CAPSULE, EXTENDED RELEASE ORAL DAILY
Qty: 90 CAPSULE | Refills: 1 | Status: SHIPPED | OUTPATIENT
Start: 2025-04-15

## 2025-04-15 RX ORDER — VENLAFAXINE HCL 150 MG
150 CAPSULE, EXT RELEASE 24 HR ORAL DAILY
Qty: 30 CAPSULE | Refills: 3 | Status: SHIPPED | OUTPATIENT
Start: 2025-04-15

## 2025-04-15 NOTE — TELEPHONE ENCOUNTER
Nephrology Progress Note    Patient looks comfortable.  Eating.    ROS:  Resp:negative  CV:negative  GI:negative  Gu:negative    MEDICATIONS:  Current Facility-Administered Medications   Medication Dose Route Frequency Provider Last Rate Last Admin   • Potassium Standard Replacement Protocol (Levels 3.5 and lower)   Does not apply See Admin Instructions Xochilt Lima MD       • Magnesium Standard Replacement Protocol   Does not apply See Admin Instructions Xochilt Lima MD       • Phosphorus Standard Replacement Protocol   Does not apply See Admin Instructions Xochilt Lima MD       • polyethylene glycol (MIRALAX) packet 17 g  17 g Oral Daily Eliot Leach MD       • piperacillin-tazobactam (ZOSYN) 3.375 g in sodium chloride 0.9 % 100 mL IVPB  3.375 g Intravenous 3 times per day Maria Fernanda Moraes CNP 25 mL/hr at 11/04/22 0632 3.375 g at 11/04/22 0632   • doxycycline (VIBRAMYCIN) 100 mg in sodium chloride 0.9 % 100 mL IVPB  100 mg Intravenous 2 times per day Eliot Leach  mL/hr at 11/04/22 0933 100 mg at 11/04/22 0933   • insulin lispro (ADMELOG,HumaLOG) - Correction Dose   Subcutaneous 4x Daily AC & HS Nicolasa Perez, DO   1 Units at 11/04/22 0926   • sodium chloride (PF) 0.9 % injection 2 mL  2 mL Intracatheter 2 times per day Abimael Alicea MD   2 mL at 11/04/22 0930   • nystatin (MYCOSTATIN) ointment   Topical BID Maria Fernanda Moraes CNP   Given at 11/04/22 0930   • rifAXIMin (XIFAXAN) tablet 550 mg  550 mg Oral Q12H Maria Fernanda Moraes, CNP   550 mg at 11/04/22 0930   • [Held by provider] spironolactone (ALDACTONE) tablet 25 mg  25 mg Oral BID Maria Fernanda Moraes, CNP       • [Held by provider] furosemide (LASIX) tablet 40 mg  40 mg Oral Daily Maria Fernanda Moraes, CNP       • lactulose (CHRONULAC) 10 GM/15ML solution 30 g  30 g Oral Q4H Maria Fernanda Ariasel, CNP   30 g at 11/04/22 0624          Physical Examination:  Vital Signs:    Visit Vitals  /68   Pulse 79   Temp 97.9 °F (36.6 °C) (Oral)   Resp 17  Effexor KAYLEIGH was sent in. Can you please sign the generic?     Ht 5' 9\" (1.753 m)   Wt 75.4 kg (166 lb 3.6 oz)   SpO2 98%   BMI 24.55 kg/m²     General:  No acute distress.  Conversant.  Head:  Normocephalic-atraumatic.   Neck:  Trachea is midline. No JVD.  Eyes:  Normal conjunctivae.  ENT:   Mucous membranes are moist.    Cardiovascular:  S1, S2 auscultated.  Regular rate and rhythm.  Bilateral peripheral pulses are intact.  + Edema.  Respiratory:   Bilateral breath sounds heard.  Lungs clear to auscultation.  Symmetric chest wall expansion.  Respirations are non-labored.    Gastrointestinal:  Soft and nontender.  Non-distended.  Positive bowel sounds.    Genitourinary: No CVA tenderness.    Musculoskeletal:  No joint swelling.  No deformity.      Skin: Warm and dry.  No rash noted.  Neurologic:   Not assessed      I/O's    Intake/Output Summary (Last 24 hours) at 11/4/2022 1008  Last data filed at 11/4/2022 0600  Gross per 24 hour   Intake 2674.18 ml   Output 1100 ml   Net 1574.18 ml       Labs:    Recent Labs   Lab 11/04/22  0543 11/03/22 2036 11/03/22  0718 11/02/22  1442   SODIUM 144  --  145 142   POTASSIUM 3.5 3.4 3.4 4.0   CHLORIDE 112*  --  111* 106   CO2 27  --  26 25   BUN 31*  --  32* 33*   CREATININE 1.58*  --  1.54* 1.93*   GLUCOSE 171*  --  136* 200*   CALCIUM 9.4  --  9.0 9.7      Recent Labs   Lab 11/04/22  0543 11/03/22  0718 11/02/22  1442   SODIUM 144 145 142   CHLORIDE 112* 111* 106   CO2 27 26 25   BUN 31* 32* 33*   CREATININE 1.58* 1.54* 1.93*   CALCIUM 9.4 9.0 9.7   ALBUMIN 2.3* 2.2* 2.7*   BILIRUBIN 1.7* 1.9* 2.6*   ALKPT 242* 219* 278*   GPT 52 42 58   AST 80* 61* 77*   GLUCOSE 171* 136* 200*      Recent Labs   Lab 11/04/22  0543   WBC 4.5   RBC 3.33*   HGB 11.5*   HCT 33.9*   PLT 89*        Imaging    CT HEAD WO CONTRAST   Final Result    No evidence of acute intracranial hemorrhage, mass, hydrocephalus, edema   or midline shift.      Electronically Signed by: CACHORRO CARRERO M.D.    Signed on: 11/2/2022 3:32 PM          XR CHEST PA OR AP 1 VIEW   Final  Result   Interval development of patchy opacity medial right lower lobe which may   represent atelectasis or infiltrate.      Electronically Signed by: CACHORRO CARRERO M.D.    Signed on: 11/2/2022 2:54 PM          [facility] EXTREMITY LOWER DUPLEX ARTERIAL    (Results Pending)        Assessment and Plan:    1.  JOE.  Renal function stable.  Excellent urine output.  I will stop IV fluid.  Monitor urine output.  Follow serum chemistries.    2.  Edema.  Diuretics on hold.  Will be resumed at a decreased frequency in the next 24 hours.    3.  Lactic acidosis.  Serum bicarbonate in the normal range.    4.  Hepatic encephalopathy.  Management per the primary team.

## 2025-04-29 ENCOUNTER — OFFICE VISIT (OUTPATIENT)
Dept: FAMILY MEDICINE CLINIC | Age: 70
End: 2025-04-29
Payer: MEDICARE

## 2025-04-29 VITALS
WEIGHT: 190 LBS | OXYGEN SATURATION: 93 % | SYSTOLIC BLOOD PRESSURE: 132 MMHG | DIASTOLIC BLOOD PRESSURE: 70 MMHG | HEART RATE: 82 BPM | BODY MASS INDEX: 34.75 KG/M2

## 2025-04-29 DIAGNOSIS — M81.0 AGE-RELATED OSTEOPOROSIS WITHOUT CURRENT PATHOLOGICAL FRACTURE: ICD-10-CM

## 2025-04-29 DIAGNOSIS — E89.0 POSTOPERATIVE HYPOTHYROIDISM: ICD-10-CM

## 2025-04-29 DIAGNOSIS — I10 ESSENTIAL HYPERTENSION: ICD-10-CM

## 2025-04-29 DIAGNOSIS — E78.2 MIXED HYPERLIPIDEMIA: ICD-10-CM

## 2025-04-29 DIAGNOSIS — J43.9 PULMONARY EMPHYSEMA, UNSPECIFIED EMPHYSEMA TYPE (HCC): ICD-10-CM

## 2025-04-29 DIAGNOSIS — E11.9 TYPE 2 DIABETES MELLITUS WITHOUT COMPLICATION, WITHOUT LONG-TERM CURRENT USE OF INSULIN (HCC): Primary | ICD-10-CM

## 2025-04-29 DIAGNOSIS — E55.9 HYPOVITAMINOSIS D: ICD-10-CM

## 2025-04-29 DIAGNOSIS — R60.0 BILATERAL LEG EDEMA: ICD-10-CM

## 2025-04-29 DIAGNOSIS — F17.219 CIGARETTE NICOTINE DEPENDENCE WITH NICOTINE-INDUCED DISORDER: ICD-10-CM

## 2025-04-29 PROBLEM — M25.50 CHRONIC PAIN OF MULTIPLE JOINTS: Status: RESOLVED | Noted: 2022-06-22 | Resolved: 2025-04-29

## 2025-04-29 PROBLEM — Z86.0100 HX OF COLONOSCOPY WITH POLYPECTOMY: Status: RESOLVED | Noted: 2022-06-27 | Resolved: 2025-04-29

## 2025-04-29 PROBLEM — Z91.81 AT HIGH RISK FOR FALLS: Status: RESOLVED | Noted: 2021-08-23 | Resolved: 2025-04-29

## 2025-04-29 PROBLEM — M79.642 BILATERAL HAND PAIN: Status: RESOLVED | Noted: 2022-12-30 | Resolved: 2025-04-29

## 2025-04-29 PROBLEM — G89.29 CHRONIC PAIN OF MULTIPLE JOINTS: Status: RESOLVED | Noted: 2022-06-22 | Resolved: 2025-04-29

## 2025-04-29 PROBLEM — E66.811 OBESITY (BMI 30.0-34.9): Status: ACTIVE | Noted: 2017-02-28

## 2025-04-29 PROBLEM — Z98.890 HX OF COLONOSCOPY WITH POLYPECTOMY: Status: RESOLVED | Noted: 2022-06-27 | Resolved: 2025-04-29

## 2025-04-29 PROBLEM — M15.8 DEGENERATIVE ARTHRITIS OF INTERPHALANGEAL JOINT OF LEFT THUMB: Status: RESOLVED | Noted: 2022-06-22 | Resolved: 2025-04-29

## 2025-04-29 PROBLEM — M79.641 BILATERAL HAND PAIN: Status: RESOLVED | Noted: 2022-12-30 | Resolved: 2025-04-29

## 2025-04-29 PROCEDURE — 3078F DIAST BP <80 MM HG: CPT | Performed by: FAMILY MEDICINE

## 2025-04-29 PROCEDURE — 1159F MED LIST DOCD IN RCRD: CPT | Performed by: FAMILY MEDICINE

## 2025-04-29 PROCEDURE — 3023F SPIROM DOC REV: CPT | Performed by: FAMILY MEDICINE

## 2025-04-29 PROCEDURE — G8417 CALC BMI ABV UP PARAM F/U: HCPCS | Performed by: FAMILY MEDICINE

## 2025-04-29 PROCEDURE — 1160F RVW MEDS BY RX/DR IN RCRD: CPT | Performed by: FAMILY MEDICINE

## 2025-04-29 PROCEDURE — 1123F ACP DISCUSS/DSCN MKR DOCD: CPT | Performed by: FAMILY MEDICINE

## 2025-04-29 PROCEDURE — 3051F HG A1C>EQUAL 7.0%<8.0%: CPT | Performed by: FAMILY MEDICINE

## 2025-04-29 PROCEDURE — G8399 PT W/DXA RESULTS DOCUMENT: HCPCS | Performed by: FAMILY MEDICINE

## 2025-04-29 PROCEDURE — 1090F PRES/ABSN URINE INCON ASSESS: CPT | Performed by: FAMILY MEDICINE

## 2025-04-29 PROCEDURE — 3075F SYST BP GE 130 - 139MM HG: CPT | Performed by: FAMILY MEDICINE

## 2025-04-29 PROCEDURE — 4004F PT TOBACCO SCREEN RCVD TLK: CPT | Performed by: FAMILY MEDICINE

## 2025-04-29 PROCEDURE — 2022F DILAT RTA XM EVC RTNOPTHY: CPT | Performed by: FAMILY MEDICINE

## 2025-04-29 PROCEDURE — 3017F COLORECTAL CA SCREEN DOC REV: CPT | Performed by: FAMILY MEDICINE

## 2025-04-29 PROCEDURE — 99214 OFFICE O/P EST MOD 30 MIN: CPT | Performed by: FAMILY MEDICINE

## 2025-04-29 PROCEDURE — G8427 DOCREV CUR MEDS BY ELIG CLIN: HCPCS | Performed by: FAMILY MEDICINE

## 2025-04-29 RX ORDER — HYDROCHLOROTHIAZIDE 25 MG/1
TABLET ORAL
Qty: 90 TABLET | Refills: 0 | Status: SHIPPED | OUTPATIENT
Start: 2025-04-29

## 2025-04-29 RX ORDER — VENLAFAXINE HYDROCHLORIDE 150 MG/1
150 CAPSULE, EXTENDED RELEASE ORAL DAILY
Qty: 90 CAPSULE | Refills: 1 | Status: SHIPPED | OUTPATIENT
Start: 2025-04-29

## 2025-04-29 RX ORDER — ROSUVASTATIN CALCIUM 40 MG/1
40 TABLET, COATED ORAL NIGHTLY
Qty: 90 TABLET | Refills: 0 | Status: SHIPPED | OUTPATIENT
Start: 2025-04-29

## 2025-04-29 RX ORDER — LEVOTHYROXINE SODIUM 88 UG/1
88 TABLET ORAL DAILY
Qty: 90 TABLET | Refills: 1 | Status: SHIPPED | OUTPATIENT
Start: 2025-04-29

## 2025-04-29 RX ORDER — GABAPENTIN 300 MG/1
300 CAPSULE ORAL 2 TIMES DAILY
Qty: 180 CAPSULE | Refills: 1 | Status: SHIPPED | OUTPATIENT
Start: 2025-04-29 | End: 2025-10-26

## 2025-04-29 RX ORDER — ALENDRONATE SODIUM 70 MG/1
70 TABLET ORAL
Qty: 12 TABLET | Refills: 1 | Status: SHIPPED | OUTPATIENT
Start: 2025-04-29

## 2025-04-29 RX ORDER — LEVOTHYROXINE SODIUM 88 UG/1
88 TABLET ORAL DAILY
Qty: 90 TABLET | Refills: 1 | Status: SHIPPED | OUTPATIENT
Start: 2025-04-29 | End: 2025-04-29 | Stop reason: SDUPTHER

## 2025-04-29 NOTE — PATIENT INSTRUCTIONS
LOW SALT FOR BLOOD PRESSURE CONTROL.  LOW CARBOHYDRATE FOR BLOOD SUGAR AND WEIGHT CONTROL.  LOW FAT DIET FOR CHOLESTEROL CONTROL.  DRINK ENOUGH FLUIDS FOR BETTER KIDNEY FUNCTION.  TAKE HCT 25 MG. DAILY FOR BLOOD PRESSURE CONTROL.  TAKE  JANUVIA 50 MG. AND JARDIANCE 10 MG. DAILY FOR BLOOD SUGAR CONTROL.  INJECT OZEMPIC 0.25 MG. SUBCUTANEOUSLY WEEKLY.    TAKE  CRESTOR 40 MG. NIGHTLY  FOR CHOLESTEROL CONTROL..  TAKE  SYNTHROID 88 MG. FOR THYROID DEFICIENCY.  INHALE STIOLOTO 2 PUFFS DAILY FOR BREATHING RELIEF.  TAKE FOSSAMAX 70 MG. WEEKLY FOR OSTEOPOROSIS.  ALSO TAKE OSCAL 500 MG. WITH VITAMIN D 1 TABLET 2 TIMES  A DAY.  TAKE GABAPENTIN 300 MG. 2 TIMES A DAY FOR LEG PAINS.  ADVISED TO QUIT SMOKING.  REGULAR WALKING ADVISED.  ADVISED WEIGHT REDUCTION.  ADVISED TO QUIT SMOKING.  FOLLOW UP WITH DR. HAWKINS VASCULAR SURGEON AS RECOMMENDED.  FASTING FOR LAB WORK ONE MORNING.  NEXT APPOINTMENT IN 2 MONTHS FOR ANNUAL PHYSICAL EXAMINATION.

## 2025-04-29 NOTE — PROGRESS NOTES
OFFICE PROGRESS NOTE      SUBJECTIVE:        Patient ID:   Selena Cary is a 69 y.o. female who presents for   Chief Complaint   Patient presents with    Discuss Labs     albumin    Discuss Medications     ozempic           HPI:     RECHECK BP, CHOLESTEROL, HYPOTHYROIDISM AND DIABETES.  MEDICATION REFILL.  FEELS GOOD. SEEN DR. HAWKINS FOR VARICOSE VEINS. HE RECOMMENDED USING SUPPORT STOCKING FOR EDEMA RELIEF.  DISCUSSED SMALL AMOUNT OF ALBUMIN IN URINE. ADVISED TO DRINK ENOUGH FLUID AND TAKE GOOD CONTROL OF DIABETES.  WATCHING DIET BUT NOT GOOD.  WALKING SOME FOR EXERCISE.  TAKING MEDICATIONS REGULARLY.         Prior to Admission medications    Medication Sig Start Date End Date Taking? Authorizing Provider   levothyroxine (SYNTHROID) 88 MCG tablet Take 1 tablet by mouth daily 1 hour before breakfast For thyroid 4/29/25  Yes Carl Sheffield MD   venlafaxine (EFFEXOR XR) 150 MG extended release capsule Take 1 capsule by mouth daily 4/15/25  Yes Carl Sheffield MD   aspirin 81 MG EC tablet Take 1 tablet by mouth daily   Yes ProviderCristian MD   tiotropium-olodaterol (STIOLTO RESPIMAT) 2.5-2.5 MCG/ACT AERS INHALE 2 PUFFS INTO THE LUNGS DAILY 3/11/25  Yes Carl Sheffield MD   Semaglutide,0.25 or 0.5MG/DOS, 2 MG/3ML SOPN Inject 0.25 mg into the skin every 7 days 2/26/25  Yes Carl Sheffield MD   gabapentin (NEURONTIN) 300 MG capsule Take 1 capsule by mouth 2 times daily for 180 days. Intended supply: 90 days 2/26/25 8/25/25 Yes Carl Sheffield MD   alendronate (FOSAMAX) 70 MG tablet TAKE 1 TABLET BY MOUTH EVERY 7 DAYS. TAKE ON AN EMPTY STOMACH WITH A FULL GLASS OF WATER AND SIT UP FOR 30 MINUTES. NO MEDS AT THE SAME TIME. 2/11/25  Yes Carl Sheffield MD   empagliflozin (JARDIANCE) 10 MG tablet Take 1 tablet by mouth daily 2/11/25  Yes Carl Sheffield MD   hydroCHLOROthiazide (HYDRODIURIL) 25 MG tablet TAKE 1 TABLET BY MOUTH DAILY FOR BLOOD PRESSURE 2/7/25  Yes Yohannes

## 2025-05-03 DIAGNOSIS — M81.0 AGE-RELATED OSTEOPOROSIS WITHOUT CURRENT PATHOLOGICAL FRACTURE: ICD-10-CM

## 2025-05-05 ENCOUNTER — PATIENT MESSAGE (OUTPATIENT)
Dept: FAMILY MEDICINE CLINIC | Age: 70
End: 2025-05-05

## 2025-05-05 RX ORDER — ALENDRONATE SODIUM 70 MG/1
TABLET ORAL
Qty: 12 TABLET | Refills: 1 | OUTPATIENT
Start: 2025-05-05

## 2025-05-08 LAB — DIABETIC RETINOPATHY: NEGATIVE

## 2025-05-09 DIAGNOSIS — E78.2 MIXED HYPERLIPIDEMIA: ICD-10-CM

## 2025-05-09 RX ORDER — ROSUVASTATIN CALCIUM 40 MG/1
40 TABLET, COATED ORAL NIGHTLY
Qty: 90 TABLET | Refills: 0 | OUTPATIENT
Start: 2025-05-09

## 2025-06-02 ENCOUNTER — HOSPITAL ENCOUNTER (OUTPATIENT)
Age: 70
Discharge: HOME | End: 2025-06-02
Payer: MEDICARE

## 2025-06-02 DIAGNOSIS — E55.9 HYPOVITAMINOSIS D: ICD-10-CM

## 2025-06-02 DIAGNOSIS — E78.2 MIXED HYPERLIPIDEMIA: ICD-10-CM

## 2025-06-02 DIAGNOSIS — E89.0 POSTOPERATIVE HYPOTHYROIDISM: ICD-10-CM

## 2025-06-02 LAB
25(OH)D3 SERPL-MCNC: 36.8 NG/ML (ref 30–100)
ALBUMIN SERPL-MCNC: 4.1 G/DL (ref 3.5–5.2)
ALP SERPL-CCNC: 60 U/L (ref 35–104)
ALT SERPL-CCNC: 11 U/L (ref 0–32)
ANION GAP SERPL CALCULATED.3IONS-SCNC: 10 MMOL/L (ref 7–16)
AST SERPL-CCNC: 18 U/L (ref 0–31)
BILIRUB SERPL-MCNC: 0.6 MG/DL (ref 0–1.2)
BUN SERPL-MCNC: 15 MG/DL (ref 6–23)
CALCIUM SERPL-MCNC: 9.1 MG/DL (ref 8.6–10.2)
CHLORIDE SERPL-SCNC: 99 MMOL/L (ref 98–107)
CHOLEST SERPL-MCNC: 141 MG/DL
CO2 SERPL-SCNC: 28 MMOL/L (ref 22–29)
CREAT SERPL-MCNC: 0.9 MG/DL (ref 0.5–1)
GFR, ESTIMATED: 70 ML/MIN/1.73M2
GLUCOSE SERPL-MCNC: 127 MG/DL (ref 74–99)
HDLC SERPL-MCNC: 49 MG/DL
LDLC SERPL CALC-MCNC: 71 MG/DL
POTASSIUM SERPL-SCNC: 4.1 MMOL/L (ref 3.5–5)
PROT SERPL-MCNC: 7 G/DL (ref 6.4–8.3)
SODIUM SERPL-SCNC: 137 MMOL/L (ref 132–146)
T4 SERPL-MCNC: 8.7 UG/DL (ref 4.5–11.7)
TRIGL SERPL-MCNC: 106 MG/DL
TSH SERPL DL<=0.05 MIU/L-ACNC: 1.5 UIU/ML (ref 0.27–4.2)
VLDLC SERPL CALC-MCNC: 21 MG/DL

## 2025-06-02 PROCEDURE — 82306 VITAMIN D 25 HYDROXY: CPT

## 2025-06-02 PROCEDURE — 80061 LIPID PANEL: CPT

## 2025-06-02 PROCEDURE — 36415 COLL VENOUS BLD VENIPUNCTURE: CPT

## 2025-06-02 PROCEDURE — 84443 ASSAY THYROID STIM HORMONE: CPT

## 2025-06-02 PROCEDURE — 84436 ASSAY OF TOTAL THYROXINE: CPT

## 2025-06-02 PROCEDURE — 80053 COMPREHEN METABOLIC PANEL: CPT

## 2025-06-03 ENCOUNTER — RESULTS FOLLOW-UP (OUTPATIENT)
Dept: FAMILY MEDICINE CLINIC | Age: 70
End: 2025-06-03

## 2025-06-03 DIAGNOSIS — E11.9 TYPE 2 DIABETES MELLITUS WITHOUT COMPLICATION, WITHOUT LONG-TERM CURRENT USE OF INSULIN (HCC): ICD-10-CM

## 2025-06-03 RX ORDER — BLOOD SUGAR DIAGNOSTIC
1 STRIP MISCELLANEOUS 2 TIMES DAILY
Qty: 100 EACH | Refills: 1 | Status: SHIPPED | OUTPATIENT
Start: 2025-06-03

## 2025-06-03 NOTE — RESULT ENCOUNTER NOTE
BLOOD SUGAR LEVEL ARE HIGH.  RECOMMEND:  LOW SALT, LOW CARB. AND LOW FAT DIET.  REGULAR EXERCISE.  CONTINUE CURRENT MEDICATIONS.   DISCUSS NEXT VISIT.   PLEASE ACKNOWLEDGE RECEIPT OF INFORMATION BY REPLYING THE MESSAGE. THANKS.

## 2025-06-03 NOTE — TELEPHONE ENCOUNTER
Name of Medication(s) Requested:  Requested Prescriptions     Pending Prescriptions Disp Refills    blood glucose test strips (RELION TRUE METRIX TEST STRIPS) strip 100 each 1     Si each by In Vitro route 2 times daily       Medication is on current medication list Yes    Dosage and directions were verified? Yes    Quantity verified: 90 day supply     Pharmacy Verified?  Yes    Last Appointment:  2025    Future appts:  Future Appointments   Date Time Provider Department Center   2025  1:15 PM Carl Sheffield MD [place] [facility] DE[facility]        (If no appt send self scheduling link. .REFILLAPPT)  Scheduling request sent?     [] Yes  [x] No    Does patient need updated?  [] Yes  [x] No

## 2025-06-11 DIAGNOSIS — I10 ESSENTIAL HYPERTENSION: ICD-10-CM

## 2025-06-11 DIAGNOSIS — E78.2 MIXED HYPERLIPIDEMIA: ICD-10-CM

## 2025-06-11 DIAGNOSIS — E11.9 TYPE 2 DIABETES MELLITUS WITHOUT COMPLICATION, WITHOUT LONG-TERM CURRENT USE OF INSULIN (HCC): ICD-10-CM

## 2025-06-11 DIAGNOSIS — E89.0 POSTOPERATIVE HYPOTHYROIDISM: ICD-10-CM

## 2025-06-11 RX ORDER — LEVOTHYROXINE SODIUM 88 UG/1
88 TABLET ORAL DAILY
Qty: 90 TABLET | Refills: 1 | Status: SHIPPED | OUTPATIENT
Start: 2025-06-11

## 2025-06-11 RX ORDER — VENLAFAXINE HYDROCHLORIDE 150 MG/1
150 CAPSULE, EXTENDED RELEASE ORAL DAILY
Qty: 90 CAPSULE | Refills: 1 | Status: SHIPPED | OUTPATIENT
Start: 2025-06-11

## 2025-06-11 RX ORDER — HYDROCHLOROTHIAZIDE 25 MG/1
TABLET ORAL
Qty: 90 TABLET | Refills: 0 | Status: SHIPPED | OUTPATIENT
Start: 2025-06-11

## 2025-06-11 RX ORDER — ROSUVASTATIN CALCIUM 40 MG/1
40 TABLET, COATED ORAL NIGHTLY
Qty: 90 TABLET | Refills: 0 | Status: SHIPPED | OUTPATIENT
Start: 2025-06-11

## 2025-06-11 RX ORDER — GABAPENTIN 300 MG/1
300 CAPSULE ORAL 2 TIMES DAILY
Qty: 180 CAPSULE | Refills: 1 | Status: SHIPPED | OUTPATIENT
Start: 2025-06-11 | End: 2025-12-08

## 2025-06-11 NOTE — TELEPHONE ENCOUNTER
Name of Medication(s) Requested:  Requested Prescriptions     Pending Prescriptions Disp Refills    venlafaxine (EFFEXOR XR) 150 MG extended release capsule 90 capsule 1     Sig: Take 1 capsule by mouth daily    SITagliptin (JANUVIA) 50 MG tablet 90 tablet 1     Sig: Take 1 tablet by mouth daily    rosuvastatin (CRESTOR) 40 MG tablet 90 tablet 0     Sig: Take 1 tablet by mouth at bedtime For cholesterol,    levothyroxine (SYNTHROID) 88 MCG tablet 90 tablet 1     Sig: Take 1 tablet by mouth daily 1 hour before breakfast For thyroid    hydroCHLOROthiazide (HYDRODIURIL) 25 MG tablet 90 tablet 0     Sig: TAKE 1 TABLET BY MOUTH DAILY FOR BLOOD PRESSURE    empagliflozin (JARDIANCE) 10 MG tablet 90 tablet 1     Sig: Take 1 tablet by mouth daily    gabapentin (NEURONTIN) 300 MG capsule 180 capsule 1     Sig: Take 1 capsule by mouth 2 times daily for 180 days. Intended supply: 90 days       Medication is on current medication list Yes    Dosage and directions were verified? Yes    Quantity verified: 90 day supply     Pharmacy Verified?  Yes    Last Appointment:  4/29/2025    Future appts:  Future Appointments   Date Time Provider Department Center   7/1/2025  1:15 PM Carl Sheffield MD [facility]        (If no appt send self scheduling link. .REFILLAPPT)  Scheduling request sent?     [] Yes  [x] No    Does patient need updated?  [] Yes  [x] No

## 2025-07-01 ENCOUNTER — OFFICE VISIT (OUTPATIENT)
Dept: FAMILY MEDICINE CLINIC | Age: 70
End: 2025-07-01

## 2025-07-01 VITALS — OXYGEN SATURATION: 95 % | DIASTOLIC BLOOD PRESSURE: 78 MMHG | HEART RATE: 78 BPM | SYSTOLIC BLOOD PRESSURE: 126 MMHG

## 2025-07-01 DIAGNOSIS — J43.9 PULMONARY EMPHYSEMA, UNSPECIFIED EMPHYSEMA TYPE (HCC): ICD-10-CM

## 2025-07-01 DIAGNOSIS — E78.2 MIXED HYPERLIPIDEMIA: ICD-10-CM

## 2025-07-01 DIAGNOSIS — E11.9 TYPE 2 DIABETES MELLITUS WITHOUT COMPLICATION, WITHOUT LONG-TERM CURRENT USE OF INSULIN (HCC): Primary | ICD-10-CM

## 2025-07-01 DIAGNOSIS — F17.219 CIGARETTE NICOTINE DEPENDENCE WITH NICOTINE-INDUCED DISORDER: ICD-10-CM

## 2025-07-01 DIAGNOSIS — E89.0 POSTOPERATIVE HYPOTHYROIDISM: ICD-10-CM

## 2025-07-01 DIAGNOSIS — M81.0 AGE-RELATED OSTEOPOROSIS WITHOUT CURRENT PATHOLOGICAL FRACTURE: ICD-10-CM

## 2025-07-01 DIAGNOSIS — I10 ESSENTIAL HYPERTENSION: ICD-10-CM

## 2025-07-01 LAB — HBA1C MFR BLD: 7.2 %

## 2025-07-01 RX ORDER — BUPROPION HYDROCHLORIDE 150 MG/1
150 TABLET ORAL EVERY MORNING
Qty: 30 TABLET | Refills: 3 | Status: SHIPPED | OUTPATIENT
Start: 2025-07-01

## 2025-07-01 NOTE — PATIENT INSTRUCTIONS
LOW SALT FOR BLOOD PRESSURE CONTROL.  LOW CARBOHYDRATE FOR BLOOD SUGAR AND WEIGHT CONTROL.  LOW FAT DIET FOR CHOLESTEROL CONTROL.  DRINK ENOUGH FLUIDS FOR BETTER KIDNEY FUNCTION.  TAKE HCT 25 MG. DAILY FOR BLOOD PRESSURE CONTROL.  TAKE  JANUVIA 50 MG. AND JARDIANCE 10 MG. DAILY FOR BLOOD SUGAR CONTROL.  INJECT OZEMPIC 0.25 MG. SUBCUTANEOUSLY WEEKLY.    TAKE  CRESTOR 40 MG. NIGHTLY  FOR CHOLESTEROL CONTROL..  TAKE  SYNTHROID 88 MG. FOR THYROID DEFICIENCY.  INHALE STIOLOTO 2 PUFFS DAILY FOR BREATHING RELIEF.  TAKE FOSSAMAX 70 MG. WEEKLY FOR OSTEOPOROSIS.  ALSO TAKE OSCAL 500 MG. WITH VITAMIN D 1 TABLET 2 TIMES  A DAY.  TAKE GABAPENTIN 300 MG. 2 TIMES A DAY FOR LEG PAINS.  TAKE WELLBUTRIN  MG. DAILY TO HELP QUIT SMOKING.  ADVISED TO QUIT SMOKING.  REGULAR WALKING ADVISED.  ADVISED WEIGHT REDUCTION.  ADVISED TO QUIT SMOKING.  FOLLOW UP WITH DR. HAWKINS VASCULAR SURGEON AS RECOMMENDED.  NEXT APPOINTMENT IN 2 MONTHS FOR ANNUAL PHYSICAL EXAMINATION.

## 2025-07-01 NOTE — PROGRESS NOTES
OFFICE PROGRESS NOTE      SUBJECTIVE:        Patient ID:   Selena Cary is a 69 y.o. female who presents for   Chief Complaint   Patient presents with    Pharyngitis    Nicotine Dependence     Would like to quit smoking  welbutrin           HPI:     RECHECK BP, CHOLESTEROL, OSTEOPOROSIS, COPD AND DIABETES.  WOULD LIKE TO QUIT SMOKING WITH THE HELP OF WELLBUTRIN. HAD TRIED NICOTINE PATCH BUT QUIT BECAUSE OF RASH.  UNABLE TO TOLERATE NICOTINE GUMS.  LOSING WEIGHT SLOWLY WITH USING OZEMPIC.  MEDICATION REFILL.  FEELS GOOD.   WATCHING DIET GOOD.  WALKING FOR EXERCISE.  TAKING MEDICATIONS REGULARLY.         Prior to Admission medications    Medication Sig Start Date End Date Taking? Authorizing Provider   buPROPion (WELLBUTRIN XL) 150 MG extended release tablet Take 1 tablet by mouth every morning 7/1/25  Yes Carl Sheffield MD   empagliflozin (JARDIANCE) 10 MG tablet Take 1 tablet by mouth daily 6/17/25  Yes Carl Sheffield MD   gabapentin (NEURONTIN) 300 MG capsule Take 1 capsule by mouth 2 times daily for 180 days. Intended supply: 90 days 6/17/25 12/14/25 Yes Carl Sheffield MD   hydroCHLOROthiazide (HYDRODIURIL) 25 MG tablet TAKE 1 TABLET BY MOUTH DAILY FOR BLOOD PRESSURE 6/17/25  Yes Carl Sheffield MD   levothyroxine (SYNTHROID) 88 MCG tablet Take 1 tablet by mouth daily 1 hour before breakfast For thyroid 6/17/25  Yes Carl Sheffield MD   rosuvastatin (CRESTOR) 40 MG tablet Take 1 tablet by mouth at bedtime For cholesterol, 6/17/25  Yes Carl Sheffield MD   SITagliptin (JANUVIA) 50 MG tablet Take 1 tablet by mouth daily 6/17/25  Yes Carl Sheffield MD   venlafaxine (EFFEXOR XR) 150 MG extended release capsule Take 1 capsule by mouth daily 6/17/25  Yes Carl Sheffield MD   blood glucose test strips (RELION TRUE METRIX TEST STRIPS) strip 1 each by In Vitro route 2 times daily 6/3/25  Yes Carl Sheffield MD   Semaglutide,0.25 or 0.5MG/DOS, 2 MG/3ML SOPN Inject 0.25 mg

## 2025-07-21 NOTE — TELEPHONE ENCOUNTER
Name of Medication(s) Requested:  Requested Prescriptions     Pending Prescriptions Disp Refills    OZEMPIC, 0.25 OR 0.5 MG/DOSE, 2 MG/3ML SOPN [Pharmacy Med Name: OZEMPIC 0.25 OR 0.5MG DOS(2MG/3ML)] 3 mL 2     Sig: INJECT 0.25 MG UNDER THE SKIN EVERY 7 DAYS       Medication is on current medication list Yes    Dosage and directions were verified? Yes    Quantity verified: 90 day supply     Pharmacy Verified?  Yes    Last Appointment:  7/1/2025    Future appts:  Future Appointments   Date Time Provider Department Center   9/2/2025  2:15 PM Carl Sheffield MD [facility] DE[facility]        (If no appt send self scheduling link. .REFILLAPPT)  Scheduling request sent?     [] Yes  [x] No    Does patient need updated?  [] Yes  [x] No

## 2025-07-22 RX ORDER — SEMAGLUTIDE 0.68 MG/ML
INJECTION, SOLUTION SUBCUTANEOUS
Qty: 3 ML | Refills: 2 | Status: SHIPPED | OUTPATIENT
Start: 2025-07-22

## 2025-08-26 RX ORDER — GABAPENTIN 300 MG/1
300 CAPSULE ORAL 2 TIMES DAILY
Qty: 180 CAPSULE | Refills: 1 | Status: SHIPPED | OUTPATIENT
Start: 2025-08-26 | End: 2026-02-22

## 2025-08-26 SDOH — HEALTH STABILITY: PHYSICAL HEALTH: ON AVERAGE, HOW MANY DAYS PER WEEK DO YOU ENGAGE IN MODERATE TO STRENUOUS EXERCISE (LIKE A BRISK WALK)?: 1 DAY

## 2025-08-26 SDOH — HEALTH STABILITY: PHYSICAL HEALTH: ON AVERAGE, HOW MANY MINUTES DO YOU ENGAGE IN EXERCISE AT THIS LEVEL?: 30 MIN

## 2025-08-26 ASSESSMENT — PATIENT HEALTH QUESTIONNAIRE - PHQ9
1. LITTLE INTEREST OR PLEASURE IN DOING THINGS: NOT AT ALL
SUM OF ALL RESPONSES TO PHQ QUESTIONS 1-9: 0
2. FEELING DOWN, DEPRESSED OR HOPELESS: NOT AT ALL
SUM OF ALL RESPONSES TO PHQ QUESTIONS 1-9: 0

## 2025-08-26 ASSESSMENT — LIFESTYLE VARIABLES
HOW OFTEN DO YOU HAVE A DRINK CONTAINING ALCOHOL: NEVER
HOW OFTEN DO YOU HAVE A DRINK CONTAINING ALCOHOL: 1
HOW OFTEN DO YOU HAVE SIX OR MORE DRINKS ON ONE OCCASION: 1
HOW MANY STANDARD DRINKS CONTAINING ALCOHOL DO YOU HAVE ON A TYPICAL DAY: PATIENT DOES NOT DRINK
HOW MANY STANDARD DRINKS CONTAINING ALCOHOL DO YOU HAVE ON A TYPICAL DAY: 0

## 2025-09-02 ENCOUNTER — OFFICE VISIT (OUTPATIENT)
Dept: FAMILY MEDICINE CLINIC | Age: 70
End: 2025-09-02
Payer: MEDICARE

## 2025-09-02 VITALS
DIASTOLIC BLOOD PRESSURE: 68 MMHG | HEART RATE: 85 BPM | HEIGHT: 62 IN | SYSTOLIC BLOOD PRESSURE: 134 MMHG | BODY MASS INDEX: 33.47 KG/M2 | TEMPERATURE: 98.4 F | OXYGEN SATURATION: 96 % | WEIGHT: 181.9 LBS

## 2025-09-02 DIAGNOSIS — E11.9 TYPE 2 DIABETES MELLITUS WITHOUT COMPLICATION, WITHOUT LONG-TERM CURRENT USE OF INSULIN (HCC): ICD-10-CM

## 2025-09-02 DIAGNOSIS — E78.2 MIXED HYPERLIPIDEMIA: ICD-10-CM

## 2025-09-02 DIAGNOSIS — M81.0 AGE-RELATED OSTEOPOROSIS WITHOUT CURRENT PATHOLOGICAL FRACTURE: ICD-10-CM

## 2025-09-02 DIAGNOSIS — E66.811 OBESITY (BMI 30.0-34.9): ICD-10-CM

## 2025-09-02 DIAGNOSIS — Z00.00 MEDICARE ANNUAL WELLNESS VISIT, SUBSEQUENT: Primary | ICD-10-CM

## 2025-09-02 DIAGNOSIS — K21.00 GASTROESOPHAGEAL REFLUX DISEASE WITH ESOPHAGITIS WITHOUT HEMORRHAGE: ICD-10-CM

## 2025-09-02 DIAGNOSIS — Z12.11 ENCOUNTER FOR SCREENING FECAL OCCULT BLOOD TESTING: ICD-10-CM

## 2025-09-02 DIAGNOSIS — I10 ESSENTIAL HYPERTENSION: ICD-10-CM

## 2025-09-02 DIAGNOSIS — J43.9 PULMONARY EMPHYSEMA, UNSPECIFIED EMPHYSEMA TYPE (HCC): ICD-10-CM

## 2025-09-02 PROCEDURE — 3078F DIAST BP <80 MM HG: CPT | Performed by: FAMILY MEDICINE

## 2025-09-02 PROCEDURE — 1159F MED LIST DOCD IN RCRD: CPT | Performed by: FAMILY MEDICINE

## 2025-09-02 PROCEDURE — 3075F SYST BP GE 130 - 139MM HG: CPT | Performed by: FAMILY MEDICINE

## 2025-09-02 PROCEDURE — 3017F COLORECTAL CA SCREEN DOC REV: CPT | Performed by: FAMILY MEDICINE

## 2025-09-02 PROCEDURE — G0439 PPPS, SUBSEQ VISIT: HCPCS | Performed by: FAMILY MEDICINE

## 2025-09-02 PROCEDURE — 1123F ACP DISCUSS/DSCN MKR DOCD: CPT | Performed by: FAMILY MEDICINE

## 2025-09-02 PROCEDURE — 3051F HG A1C>EQUAL 7.0%<8.0%: CPT | Performed by: FAMILY MEDICINE

## 2025-09-02 RX ORDER — ALENDRONATE SODIUM 70 MG/1
TABLET ORAL
Qty: 12 TABLET | Refills: 0 | OUTPATIENT
Start: 2025-09-02

## 2025-09-02 ASSESSMENT — PATIENT HEALTH QUESTIONNAIRE - PHQ9
7. TROUBLE CONCENTRATING ON THINGS, SUCH AS READING THE NEWSPAPER OR WATCHING TELEVISION: SEVERAL DAYS
6. FEELING BAD ABOUT YOURSELF - OR THAT YOU ARE A FAILURE OR HAVE LET YOURSELF OR YOUR FAMILY DOWN: NOT AT ALL
SUM OF ALL RESPONSES TO PHQ QUESTIONS 1-9: 2
4. FEELING TIRED OR HAVING LITTLE ENERGY: SEVERAL DAYS
SUM OF ALL RESPONSES TO PHQ QUESTIONS 1-9: 2
2. FEELING DOWN, DEPRESSED OR HOPELESS: NOT AT ALL
5. POOR APPETITE OR OVEREATING: NOT AT ALL
1. LITTLE INTEREST OR PLEASURE IN DOING THINGS: NOT AT ALL
9. THOUGHTS THAT YOU WOULD BE BETTER OFF DEAD, OR OF HURTING YOURSELF: NOT AT ALL
SUM OF ALL RESPONSES TO PHQ QUESTIONS 1-9: 2
8. MOVING OR SPEAKING SO SLOWLY THAT OTHER PEOPLE COULD HAVE NOTICED. OR THE OPPOSITE, BEING SO FIGETY OR RESTLESS THAT YOU HAVE BEEN MOVING AROUND A LOT MORE THAN USUAL: NOT AT ALL
SUM OF ALL RESPONSES TO PHQ QUESTIONS 1-9: 2
10. IF YOU CHECKED OFF ANY PROBLEMS, HOW DIFFICULT HAVE THESE PROBLEMS MADE IT FOR YOU TO DO YOUR WORK, TAKE CARE OF THINGS AT HOME, OR GET ALONG WITH OTHER PEOPLE: NOT DIFFICULT AT ALL
3. TROUBLE FALLING OR STAYING ASLEEP: NOT AT ALL